# Patient Record
Sex: FEMALE | Race: WHITE | NOT HISPANIC OR LATINO | Employment: FULL TIME
[De-identification: names, ages, dates, MRNs, and addresses within clinical notes are randomized per-mention and may not be internally consistent; named-entity substitution may affect disease eponyms.]

---

## 2017-11-12 ENCOUNTER — HEALTH MAINTENANCE LETTER (OUTPATIENT)
Age: 32
End: 2017-11-12

## 2018-10-26 ENCOUNTER — APPOINTMENT (OUTPATIENT)
Dept: CT IMAGING | Facility: CLINIC | Age: 33
End: 2018-10-26
Attending: EMERGENCY MEDICINE
Payer: COMMERCIAL

## 2018-10-26 ENCOUNTER — HOSPITAL ENCOUNTER (EMERGENCY)
Facility: CLINIC | Age: 33
Discharge: HOME | End: 2018-10-26
Attending: EMERGENCY MEDICINE | Admitting: EMERGENCY MEDICINE
Payer: COMMERCIAL

## 2018-10-26 VITALS
DIASTOLIC BLOOD PRESSURE: 60 MMHG | RESPIRATION RATE: 20 BRPM | BODY MASS INDEX: 27.32 KG/M2 | HEIGHT: 66 IN | OXYGEN SATURATION: 97 % | TEMPERATURE: 99.5 F | WEIGHT: 170 LBS | SYSTOLIC BLOOD PRESSURE: 104 MMHG | HEART RATE: 122 BPM

## 2018-10-26 DIAGNOSIS — N10 ACUTE PYELONEPHRITIS: ICD-10-CM

## 2018-10-26 LAB
ALBUMIN SERPL-MCNC: 3.9 G/DL (ref 3.4–5)
ALBUMIN UR-MCNC: 100 MG/DL
ALP SERPL-CCNC: 46 U/L (ref 40–150)
ALT SERPL W P-5'-P-CCNC: 23 U/L (ref 0–50)
AMORPH CRY #/AREA URNS HPF: ABNORMAL /HPF
ANION GAP SERPL CALCULATED.3IONS-SCNC: 8 MMOL/L (ref 3–14)
APPEARANCE UR: ABNORMAL
AST SERPL W P-5'-P-CCNC: 13 U/L (ref 0–45)
B-HCG FREE SERPL-ACNC: <5 IU/L
BACTERIA #/AREA URNS HPF: ABNORMAL /HPF
BASOPHILS # BLD AUTO: 0 10E9/L (ref 0–0.2)
BASOPHILS NFR BLD AUTO: 0.2 %
BILIRUB SERPL-MCNC: 0.6 MG/DL (ref 0.2–1.3)
BILIRUB UR QL STRIP: NEGATIVE
BUN SERPL-MCNC: 9 MG/DL (ref 7–30)
CALCIUM SERPL-MCNC: 8.6 MG/DL (ref 8.5–10.1)
CHLORIDE SERPL-SCNC: 107 MMOL/L (ref 94–109)
CO2 SERPL-SCNC: 24 MMOL/L (ref 20–32)
COLOR UR AUTO: YELLOW
CREAT SERPL-MCNC: 0.77 MG/DL (ref 0.52–1.04)
DIFFERENTIAL METHOD BLD: ABNORMAL
EOSINOPHIL # BLD AUTO: 0.1 10E9/L (ref 0–0.7)
EOSINOPHIL NFR BLD AUTO: 0.5 %
ERYTHROCYTE [DISTWIDTH] IN BLOOD BY AUTOMATED COUNT: 12.9 % (ref 10–15)
GFR SERPL CREATININE-BSD FRML MDRD: 86 ML/MIN/1.7M2
GLUCOSE SERPL-MCNC: 142 MG/DL (ref 70–99)
GLUCOSE UR STRIP-MCNC: NEGATIVE MG/DL
HCT VFR BLD AUTO: 38.5 % (ref 35–47)
HGB BLD-MCNC: 12.9 G/DL (ref 11.7–15.7)
HGB UR QL STRIP: ABNORMAL
IMM GRANULOCYTES # BLD: 0 10E9/L (ref 0–0.4)
IMM GRANULOCYTES NFR BLD: 0.4 %
KETONES UR STRIP-MCNC: NEGATIVE MG/DL
LEUKOCYTE ESTERASE UR QL STRIP: ABNORMAL
LIPASE SERPL-CCNC: 112 U/L (ref 73–393)
LYMPHOCYTES # BLD AUTO: 0.5 10E9/L (ref 0.8–5.3)
LYMPHOCYTES NFR BLD AUTO: 4 %
MCH RBC QN AUTO: 31.4 PG (ref 26.5–33)
MCHC RBC AUTO-ENTMCNC: 33.5 G/DL (ref 31.5–36.5)
MCV RBC AUTO: 94 FL (ref 78–100)
MONOCYTES # BLD AUTO: 0.3 10E9/L (ref 0–1.3)
MONOCYTES NFR BLD AUTO: 2.8 %
MUCOUS THREADS #/AREA URNS LPF: PRESENT /LPF
NEUTROPHILS # BLD AUTO: 10.4 10E9/L (ref 1.6–8.3)
NEUTROPHILS NFR BLD AUTO: 92.1 %
NITRATE UR QL: NEGATIVE
NRBC # BLD AUTO: 0 10*3/UL
NRBC BLD AUTO-RTO: 0 /100
PH UR STRIP: 6 PH (ref 5–7)
PLATELET # BLD AUTO: 178 10E9/L (ref 150–450)
POTASSIUM SERPL-SCNC: 3.9 MMOL/L (ref 3.4–5.3)
PROT SERPL-MCNC: 7.1 G/DL (ref 6.8–8.8)
RBC # BLD AUTO: 4.11 10E12/L (ref 3.8–5.2)
RBC #/AREA URNS AUTO: 47 /HPF (ref 0–2)
SODIUM SERPL-SCNC: 139 MMOL/L (ref 133–144)
SOURCE: ABNORMAL
SP GR UR STRIP: 1.01 (ref 1–1.03)
SQUAMOUS #/AREA URNS AUTO: 5 /HPF (ref 0–1)
TRANS CELLS #/AREA URNS HPF: 42 /HPF (ref 0–1)
UROBILINOGEN UR STRIP-MCNC: 0 MG/DL (ref 0–2)
WBC # BLD AUTO: 11.2 10E9/L (ref 4–11)
WBC #/AREA URNS AUTO: >182 /HPF (ref 0–5)
WBC CLUMPS #/AREA URNS HPF: PRESENT /HPF

## 2018-10-26 PROCEDURE — 83690 ASSAY OF LIPASE: CPT | Performed by: EMERGENCY MEDICINE

## 2018-10-26 PROCEDURE — 25000128 H RX IP 250 OP 636: Performed by: EMERGENCY MEDICINE

## 2018-10-26 PROCEDURE — 80053 COMPREHEN METABOLIC PANEL: CPT | Performed by: EMERGENCY MEDICINE

## 2018-10-26 PROCEDURE — 74176 CT ABD & PELVIS W/O CONTRAST: CPT

## 2018-10-26 PROCEDURE — 81001 URINALYSIS AUTO W/SCOPE: CPT | Performed by: EMERGENCY MEDICINE

## 2018-10-26 PROCEDURE — 84702 CHORIONIC GONADOTROPIN TEST: CPT

## 2018-10-26 PROCEDURE — 99285 EMERGENCY DEPT VISIT HI MDM: CPT | Mod: 25

## 2018-10-26 PROCEDURE — 96365 THER/PROPH/DIAG IV INF INIT: CPT

## 2018-10-26 PROCEDURE — 96376 TX/PRO/DX INJ SAME DRUG ADON: CPT

## 2018-10-26 PROCEDURE — 85025 COMPLETE CBC W/AUTO DIFF WBC: CPT | Performed by: EMERGENCY MEDICINE

## 2018-10-26 PROCEDURE — 87086 URINE CULTURE/COLONY COUNT: CPT | Performed by: EMERGENCY MEDICINE

## 2018-10-26 PROCEDURE — 87088 URINE BACTERIA CULTURE: CPT | Performed by: EMERGENCY MEDICINE

## 2018-10-26 PROCEDURE — 96361 HYDRATE IV INFUSION ADD-ON: CPT

## 2018-10-26 PROCEDURE — 96375 TX/PRO/DX INJ NEW DRUG ADDON: CPT

## 2018-10-26 PROCEDURE — 87186 SC STD MICRODIL/AGAR DIL: CPT | Performed by: EMERGENCY MEDICINE

## 2018-10-26 RX ORDER — ONDANSETRON 2 MG/ML
4 INJECTION INTRAMUSCULAR; INTRAVENOUS
Status: COMPLETED | OUTPATIENT
Start: 2018-10-26 | End: 2018-10-26

## 2018-10-26 RX ORDER — MORPHINE SULFATE 4 MG/ML
4 INJECTION, SOLUTION INTRAMUSCULAR; INTRAVENOUS
Status: COMPLETED | OUTPATIENT
Start: 2018-10-26 | End: 2018-10-26

## 2018-10-26 RX ORDER — HYDROCODONE BITARTRATE AND ACETAMINOPHEN 5; 325 MG/1; MG/1
1 TABLET ORAL EVERY 4 HOURS PRN
Qty: 8 TABLET | Refills: 0 | Status: SHIPPED | OUTPATIENT
Start: 2018-10-26 | End: 2018-11-08

## 2018-10-26 RX ORDER — CEPHALEXIN 500 MG/1
500 CAPSULE ORAL 2 TIMES DAILY
Qty: 14 CAPSULE | Refills: 0 | Status: SHIPPED | OUTPATIENT
Start: 2018-10-26 | End: 2018-11-02

## 2018-10-26 RX ORDER — MORPHINE SULFATE 4 MG/ML
4 INJECTION, SOLUTION INTRAMUSCULAR; INTRAVENOUS
Status: DISCONTINUED | OUTPATIENT
Start: 2018-10-26 | End: 2018-10-26 | Stop reason: HOSPADM

## 2018-10-26 RX ORDER — ONDANSETRON 4 MG/1
4 TABLET, ORALLY DISINTEGRATING ORAL EVERY 6 HOURS PRN
Qty: 12 TABLET | Refills: 0 | Status: SHIPPED | OUTPATIENT
Start: 2018-10-26 | End: 2018-10-29

## 2018-10-26 RX ORDER — KETOROLAC TROMETHAMINE 15 MG/ML
15 INJECTION, SOLUTION INTRAMUSCULAR; INTRAVENOUS ONCE
Status: COMPLETED | OUTPATIENT
Start: 2018-10-26 | End: 2018-10-26

## 2018-10-26 RX ORDER — CEFTRIAXONE 1 G/1
1 INJECTION, POWDER, FOR SOLUTION INTRAMUSCULAR; INTRAVENOUS ONCE
Status: COMPLETED | OUTPATIENT
Start: 2018-10-26 | End: 2018-10-26

## 2018-10-26 RX ADMIN — MORPHINE SULFATE 4 MG: 4 INJECTION INTRAVENOUS at 04:48

## 2018-10-26 RX ADMIN — KETOROLAC TROMETHAMINE 15 MG: 15 INJECTION, SOLUTION INTRAMUSCULAR; INTRAVENOUS at 04:48

## 2018-10-26 RX ADMIN — MORPHINE SULFATE 4 MG: 4 INJECTION INTRAVENOUS at 06:30

## 2018-10-26 RX ADMIN — ONDANSETRON 4 MG: 2 INJECTION INTRAMUSCULAR; INTRAVENOUS at 04:48

## 2018-10-26 RX ADMIN — SODIUM CHLORIDE 1000 ML: 9 INJECTION, SOLUTION INTRAVENOUS at 04:43

## 2018-10-26 RX ADMIN — CEFTRIAXONE SODIUM 1 G: 1 INJECTION, POWDER, FOR SOLUTION INTRAMUSCULAR; INTRAVENOUS at 06:27

## 2018-10-26 RX ADMIN — MORPHINE SULFATE 4 MG: 4 INJECTION INTRAVENOUS at 05:12

## 2018-10-26 ASSESSMENT — ENCOUNTER SYMPTOMS
FLANK PAIN: 1
NAUSEA: 1

## 2018-10-26 NOTE — ED AVS SNAPSHOT
[facility] Emergency Department    [address]    [facility] [address]    Phone:  [phone]    Fax:  [phone]                                       Minerva Cardona   MRN: [id number]    Department:  [facility] Emergency Department   Date of Visit:  10/26/2018           After Visit Summary Signature Page     I have received my discharge instructions, and my questions have been answered. I have discussed any challenges I see with this plan with the nurse or doctor.    ..........................................................................................................................................  Patient/Patient Representative Signature      ..........................................................................................................................................  Patient Representative Print Name and Relationship to Patient    ..................................................               ................................................  Date                                   Time    ..........................................................................................................................................  Reviewed by Signature/Title    ...................................................              ..............................................  Date                                               Time          22EPIC Rev 08/18

## 2018-10-26 NOTE — LETTER
October 26, 2018      To Whom It May Concern:      Minerva Cardona was seen in our Emergency Department today, 10/26/18.  I expect her condition to improve over the next 2-3 days.  She may return to work when improved.    Sincerely,        Ramiro Vo RN

## 2018-10-26 NOTE — ED PROVIDER NOTES
"  History     Chief Complaint:  Flank Pain    HPI   Minerva Cardona is a 33 year old female who presents with right sided flank pain for a day. No recent abdominal surgery. She reports that she has had this for a day. She reports that the pain started in her mid abdomen but has radiated to her back. She denies any left sided pain. Tylenol at 2100. No chance of pregnancy per patient. No fever or diarrhea.  Positive nausea.       Allergies:  No Known Allergies     Medications:    Albuterol   IUD    Past Medical History:    History reviewed. No pertinent past medical history.    Past Surgical History:    History reviewed. No pertinent surgical history.    Family History:    Cancer    Social History:  The patient  reports that she has never smoked. She has never used smokeless tobacco. She reports that she drinks alcohol. She reports that she does not use illicit drugs.   Marital Status:       Review of Systems   Gastrointestinal: Positive for nausea.   Genitourinary: Positive for flank pain.   All other systems reviewed and are negative.    Physical Exam     Patient Vitals for the past 24 hrs:   BP Temp Temp src Pulse Resp SpO2 Height Weight   10/26/18 0428 120/74 99.5  F (37.5  C) Temporal 122 20 99 % 1.676 m (5' 6\") 77.1 kg (170 lb)         Physical Exam  Nursing note and vitals reviewed.  Constitutional: Cooperative. Uncomfortable appearing.   HENT:   Mouth/Throat: Mucous membranes are normal.   Cardiovascular: Tachycardic rate, regular rhythm and normal heart sounds.  No murmur.  Pulmonary/Chest: Effort normal and breath sounds normal. No respiratory distress. No wheezes. No rales.   Abdominal: Soft. Normal appearance and bowel sounds are normal. No distension. There is mild RUQ and Right CVA tenderness. There is no rigidity and no guarding.   Neurological: Alert. Oriented x4  Skin: Skin is warm and dry. No rash noted.   Psychiatric: Normal mood and affect.     Emergency Department Course   Imaging:  CT " Abdomen Pelvis without Contrast (stone protocol)   Preliminary Result   IMPRESSION: No urinary stone or hydronephrosis. No acute abnormality.        I communicated the results of the imaging studies with the patient who expressed understanding of these findings.      Laboratory:  UA: LE Large, WBC >182/hpf, RBC 47/hpf, WBC clumps present, Bacteria moderate    Urine culture: pending    ISTAT HCG <5.0    CBC: WBC 11.2, Absolute Neutrophil 10.4, Absolute Lymphocytes 0.5, otherwise within normal limits   CMP: Glucose 142, otherwise within normal limits   Lipase 112    Interventions:  0443: NS 1L IV Bolus   0448: Morphine 4mg IV  0448: Toradol 10mg IV  0448: Zofran 4mg IV  0512: Morphine 4mg    0615: Rocephin 1g IV    Emergency Department Course:  Past medical records, nursing notes, and vitals reviewed.  I performed an exam of the patient and obtained history, as documented above.       IV inserted and blood drawn for the above work up to be conducted.     The patient was sent for imaging studies while in the emergency department, findings above.       Impression & Plan    Medical Decision Makin yo female who presents with right flank pain.  CT negative for ureteral stone, bowel obstruction/perforation, appendicitis or other pathology.  UA c/w infection.  Clinical picture c/w uncomplicated pyelonephritis.  No signs of sepsis or severe sepsis.  IV dose of antibiotics administered and culture sent.  Plan for discharge home with pain/nausea medication is addition to antibiotics.  Follow up instructions and return precautions discussed.        Diagnosis:    ICD-10-CM    1. Acute pyelonephritis N10        Disposition:  discharged to home    Discharge Medications:  New Prescriptions    CEPHALEXIN (KEFLEX) 500 MG CAPSULE    Take 1 capsule (500 mg) by mouth 2 times daily for 7 days    HYDROCODONE-ACETAMINOPHEN (NORCO) 5-325 MG PER TABLET    Take 1 tablet by mouth every 4 hours as needed for pain    ONDANSETRON (ZOFRAN  ODT) 4 MG ODT TAB    Take 1 tablet (4 mg) by mouth every 6 hours as needed for nausea     IMitzi am serving as a scribe at 4:34 AM on 10/26/2018 to document services personally performed by Bar Irwin MD based on my observations and the provider's statements to me.    [facility] EMERGENCY DEPARTMENT       Bar Irwin MD  10/26/18 0621

## 2018-10-26 NOTE — ED AVS SNAPSHOT
[facility] Emergency Department    [address] [place] [address]    Phone:  [phone]    Fax:  [phone]                                       Minerva Cardona   MRN: [id number]    Department:  [facility] Emergency Department   Date of Visit:  10/26/2018           Patient Information     Date Of Birth          1985        Your diagnoses for this visit were:     Acute pyelonephritis        You were seen by Bar Irwin MD.      Follow-up Information     Follow up with Dixie Gilman PA-C.    Specialty:  Physician Assistant    Why:  As needed    Contact information:    [address] [place] [address]  [phone]        Discharge References/Attachments     PYELONEPHRITIS, FEMALE (ADULT) (ENGLISH)      24 Hour Appointment Hotline       To make an appointment at any [facility], call [phone] ([phone]). If you don't have a family doctor or clinic, we will help you find one. [facility] are conveniently located to serve the needs of you and your family.             Review of your medicines      START taking        Dose / Directions Last dose taken    cephALEXin 500 MG capsule   Commonly known as:  KEFLEX   Dose:  500 mg   Quantity:  14 capsule        Take 1 capsule (500 mg) by mouth 2 times daily for 7 days   Refills:  0        HYDROcodone-acetaminophen 5-325 MG per tablet   Commonly known as:  NORCO   Dose:  1 tablet   Quantity:  8 tablet        Take 1 tablet by mouth every 4 hours as needed for pain   Refills:  0        ondansetron 4 MG ODT tab   Commonly known as:  ZOFRAN ODT   Dose:  4 mg   Quantity:  12 tablet        Take 1 tablet (4 mg) by mouth every 6 hours as needed for nausea   Refills:  0          Our records show that you are taking the medicines listed below. If these are incorrect, please call your family doctor or clinic.        Dose / Directions Last dose taken    albuterol 108 (90 Base) MCG/ACT inhaler    Commonly known as:  PROAIR HFA/PROVENTIL HFA/VENTOLIN HFA   Dose:  2 puff   Quantity:  1 Inhaler        Inhale 2 puffs into the lungs every 4 hours as needed for shortness of breath / dyspnea (or can try before a run)   Refills:  1        paragard intrauterine copper   Dose:  1 each        1 each by Intrauterine route once.   Refills:  0                Information about OPIOIDS     PRESCRIPTION OPIOIDS: WHAT YOU NEED TO KNOW   We gave you an opioid (narcotic) pain medicine. It is important to manage your pain, but opioids are not always the best choice. You should first try all the other options your care team gave you. Take this medicine for as short a time (and as few doses) as possible.    Some activities can increase your pain, such as bandage changes or therapy sessions. It may help to take your pain medicine 30 to 60 minutes before these activities. Reduce your stress by getting enough sleep, working on hobbies you enjoy and practicing relaxation or meditation. Talk to your care team about ways to manage your pain beyond prescription opioids.    These medicines have risks:    DO NOT drive when on new or higher doses of pain medicine. These medicines can affect your alertness and reaction times, and you could be arrested for driving under the influence (DUI). If you need to use opioids long-term, talk to your care team about driving.    DO NOT operate heavy machinery    DO NOT do any other dangerous activities while taking these medicines.    DO NOT drink any alcohol while taking these medicines.     If the opioid prescribed includes acetaminophen, DO NOT take with any other medicines that contain acetaminophen. Read all labels carefully. Look for the word  acetaminophen  or  Tylenol.  Ask your pharmacist if you have questions or are unsure.    You can get addicted to pain medicines, especially if you have a history of addiction (chemical, alcohol or substance dependence). Talk to your care team about ways to  reduce this risk.    All opioids tend to cause constipation. Drink plenty of water and eat foods that have a lot of fiber, such as fruits, vegetables, prune juice, apple juice and high-fiber cereal. Take a laxative (Miralax, milk of magnesia, Colace, Senna) if you don t move your bowels at least every other day. Other side effects include upset stomach, sleepiness, dizziness, throwing up, tolerance (needing more of the medicine to have the same effect), physical dependence and slowed breathing.    Store your pills in a secure place, locked if possible. We will not replace any lost or stolen medicine. If you don t finish your medicine, please throw away (dispose) as directed by your pharmacist. The [place] [employer]ution [employer] has more information about safe disposal: [email or web address]://[email or web address].[email or web address]/living-green/[email or web address]-unwanted-medications        Prescriptions were sent or printed at these locations (3 Prescriptions)                   Other Prescriptions                Printed at Department/Unit printer (3 of 3)         cephALEXin (KEFLEX) 500 MG capsule               HYDROcodone-acetaminophen (NORCO) 5-325 MG per tablet               ondansetron (ZOFRAN ODT) 4 MG ODT tab                Procedures and tests performed during your visit     CBC with platelets differential    CT Abdomen Pelvis without Contrast (stone protocol)    Comprehensive metabolic panel    ISTAT HCG Quantitative Pregnancy Nursing POCT    ISTAT HCG Quantitative Pregnancy POCT    Lipase    Peripheral IV: Standard    Pulse oximetry nursing    UA with Microscopic    Urine Culture Aerobic Bacterial      Orders Needing Specimen Collection     None      Pending Results     Date and Time Order Name Status Description    10/26/2018 0613 Urine Culture Aerobic Bacterial In process     10/26/2018 0441 CT Abdomen Pelvis without Contrast (stone protocol) Preliminary             Pending Culture Results     Date and Time Order Name Status Description     10/26/2018 0613 Urine Culture Aerobic Bacterial In process             Pending Results Instructions     If you had any lab results that were not finalized at the time of your Discharge, you can call the ED Lab Result RN at [phone]. You will be contacted by this team for any positive Lab results or changes in treatment. The nurses are available 7 days a week from 10A to 6:30P.  You can leave a message 24 hours per day and they will return your call.        Test Results From Your Hospital Stay        10/26/2018  4:52 AM      Component Results     Component Value Ref Range & Units Status    WBC 11.2 (H) 4.0 - 11.0 10e9/L Final    RBC Count 4.11 3.8 - 5.2 10e12/L Final    Hemoglobin 12.9 11.7 - 15.7 g/dL Final    Hematocrit 38.5 35.0 - 47.0 % Final    MCV 94 78 - 100 fl Final    MCH 31.4 26.5 - 33.0 pg Final    MCHC 33.5 31.5 - 36.5 g/dL Final    RDW 12.9 10.0 - 15.0 % Final    Platelet Count 178 150 - 450 10e9/L Final    Diff Method Automated Method  Final    % Neutrophils 92.1 % Final    % Lymphocytes 4.0 % Final    % Monocytes 2.8 % Final    % Eosinophils 0.5 % Final    % Basophils 0.2 % Final    % Immature Granulocytes 0.4 % Final    Nucleated RBCs 0 0 /100 Final    Absolute Neutrophil 10.4 (H) 1.6 - 8.3 10e9/L Final    Absolute Lymphocytes 0.5 (L) 0.8 - 5.3 10e9/L Final    Absolute Monocytes 0.3 0.0 - 1.3 10e9/L Final    Absolute Eosinophils 0.1 0.0 - 0.7 10e9/L Final    Absolute Basophils 0.0 0.0 - 0.2 10e9/L Final    Abs Immature Granulocytes 0.0 0 - 0.4 10e9/L Final    Absolute Nucleated RBC 0.0  Final         10/26/2018  5:11 AM      Component Results     Component Value Ref Range & Units Status    Sodium 139 133 - 144 mmol/L Final    Potassium 3.9 3.4 - 5.3 mmol/L Final    Chloride 107 94 - 109 mmol/L Final    Carbon Dioxide 24 20 - 32 mmol/L Final    Anion Gap 8 3 - 14 mmol/L Final    Glucose 142 (H) 70 - 99 mg/dL Final    Urea Nitrogen 9 7 - 30 mg/dL Final    Creatinine 0.77 0.52 - 1.04 mg/dL Final     GFR Estimate 86 >60 mL/min/1.7m2 Final    Non  GFR Calc    GFR Estimate If Black >90 >60 mL/min/1.7m2 Final    African American GFR Calc    Calcium 8.6 8.5 - 10.1 mg/dL Final    Bilirubin Total 0.6 0.2 - 1.3 mg/dL Final    Albumin 3.9 3.4 - 5.0 g/dL Final    Protein Total 7.1 6.8 - 8.8 g/dL Final    Alkaline Phosphatase 46 40 - 150 U/L Final    ALT 23 0 - 50 U/L Final    AST 13 0 - 45 U/L Final         10/26/2018  5:11 AM      Component Results     Component Value Ref Range & Units Status    Lipase 112 73 - 393 U/L Final         10/26/2018  6:08 AM      Component Results     Component Value Ref Range & Units Status    Color Urine Yellow  Final    Appearance Urine Cloudy  Final    Glucose Urine Negative NEG^Negative mg/dL Final    Bilirubin Urine Negative NEG^Negative Final    Ketones Urine Negative NEG^Negative mg/dL Final    Specific Gravity Urine 1.013 1.003 - 1.035 Final    Blood Urine Moderate (A) NEG^Negative Final    pH Urine 6.0 5.0 - 7.0 pH Final    Protein Albumin Urine 100 (A) NEG^Negative mg/dL Final    Urobilinogen mg/dL 0.0 0.0 - 2.0 mg/dL Final    Nitrite Urine Negative NEG^Negative Final    Leukocyte Esterase Urine Large (A) NEG^Negative Final    Source Midstream Urine  Final    WBC Urine >182 (H) 0 - 5 /HPF Final    RBC Urine 47 (H) 0 - 2 /HPF Final    WBC Clumps Present (A) NEG^Negative /HPF Final    Bacteria Urine Moderate (A) NEG^Negative /HPF Final    Squamous Epithelial /HPF Urine 5 (H) 0 - 1 /HPF Final    Transitional Epi 42 (H) 0 - 1 /HPF Final    Mucous Urine Present (A) NEG^Negative /LPF Final    Amorphous Crystals Few (A) NEG^Negative /HPF Final         10/26/2018  5:20 AM      Narrative     CT ABDOMEN PELVIS W/O CONTRAST  10/26/2018 5:05 AM     HISTORY: Abdominal pain - right flank.    TECHNIQUE: Noncontrast CT abdomen and pelvis was performed. Radiation  dose for this scan was reduced using automated exposure control,  adjustment of the mA and/or kV according to patient  size, or iterative  reconstruction technique.    COMPARISON: None.    FINDINGS:  Abdomen: There is no renal or ureteral stone on either side. No  hydronephrosis. The kidneys appear normal on this noncontrast exam.    The lung bases are unremarkable. Evaluation of the solid abdominal  organs is limited by the lack of intravenous contrast. The spleen is  at the upper limits of normal in size. The liver, gallbladder,  pancreas and adrenal glands are normal in appearance. There is no  abdominal or pelvic lymph node enlargement.    Pelvis: There is an IUD in place. No adnexal mass. No bowel  obstruction or inflammation. The appendix is normal. No free  intraperitoneal gas or fluid.        Impression     IMPRESSION: No urinary stone or hydronephrosis. No acute abnormality.         10/26/2018  4:57 AM      Component Results     Component Value Ref Range & Units Status    HCG Quantitative Serum <5.0 <5.0 IU/L Final         10/26/2018  6:22 AM                Clinical Quality Measure: Blood Pressure Screening     Your blood pressure was checked while you were in the emergency department today. The last reading we obtained was  BP: 104/60 . Please read the guidelines below about what these numbers mean and what you should do about them.  If your systolic blood pressure (the top number) is less than 120 and your diastolic blood pressure (the bottom number) is less than 80, then your blood pressure is normal. There is nothing more that you need to do about it.  If your systolic blood pressure (the top number) is 120-139 or your diastolic blood pressure (the bottom number) is 80-89, your blood pressure may be higher than it should be. You should have your blood pressure rechecked within a year by a primary care provider.  If your systolic blood pressure (the top number) is 140 or greater or your diastolic blood pressure (the bottom number) is 90 or greater, you may have high blood pressure. High blood pressure is treatable, but  if left untreated over time it can put you at risk for heart attack, stroke, or kidney failure. You should have your blood pressure rechecked by a primary care provider within the next 4 weeks.  If your provider in the emergency department today gave you specific instructions to follow-up with your doctor or provider even sooner than that, you should follow that instruction and not wait for up to 4 weeks for your follow-up visit.        Thank you for choosing [facility]       Thank you for choosing [facility] for your care. Our goal is always to provide you with excellent care. Hearing back from our patients is one way we can continue to improve our services. Please take a few minutes to complete the written survey that you may receive in the mail after you visit with us. Thank you!        [employer] Information     [employer] gives you secure access to your electronic health record. If you see a primary care provider, you can also send messages to your care team and make appointments. If you have questions, please call your primary care clinic.  If you do not have a primary care provider, please call [phone] and they will assist you.        Care EveryWhere ID     This is your Care EveryWhere ID. This could be used by other organizations to access your [facility] medical records  [id number]        Equal Access to Services     HAYLEE ARGUETA : Hadii noemy Corea, akosua mars, laquita estrada, karine campoverde. So [facility] [phone].    ATENCIÓN: Si habla español, tiene a ortiz disposición servicios gratuitos de asistencia lingüística. Joshua al [phone].    We comply with applicable federal civil rights laws and [place] laws. We do not discriminate on the basis of race, color, national origin, age, disability, sex, sexual orientation, or gender identity.            After Visit Summary       This is your record. Keep this with you and show to your community pharmacist(s) and  doctor(s) at your next visit.

## 2018-10-27 LAB
BACTERIA SPEC CULT: ABNORMAL
Lab: ABNORMAL
SPECIMEN SOURCE: ABNORMAL

## 2018-11-08 ENCOUNTER — OFFICE VISIT (OUTPATIENT)
Dept: FAMILY MEDICINE | Facility: CLINIC | Age: 33
End: 2018-11-08
Payer: COMMERCIAL

## 2018-11-08 VITALS
BODY MASS INDEX: 28.25 KG/M2 | SYSTOLIC BLOOD PRESSURE: 110 MMHG | HEART RATE: 60 BPM | DIASTOLIC BLOOD PRESSURE: 62 MMHG | TEMPERATURE: 97.8 F | WEIGHT: 175 LBS | RESPIRATION RATE: 20 BRPM

## 2018-11-08 DIAGNOSIS — R30.0 DYSURIA: Primary | ICD-10-CM

## 2018-11-08 LAB
ALBUMIN UR-MCNC: NEGATIVE MG/DL
APPEARANCE UR: CLEAR
BACTERIA #/AREA URNS HPF: ABNORMAL /HPF
BILIRUB UR QL STRIP: NEGATIVE
COLOR UR AUTO: YELLOW
GLUCOSE UR STRIP-MCNC: NEGATIVE MG/DL
HGB UR QL STRIP: ABNORMAL
KETONES UR STRIP-MCNC: NEGATIVE MG/DL
LEUKOCYTE ESTERASE UR QL STRIP: ABNORMAL
NITRATE UR QL: NEGATIVE
PH UR STRIP: 7 PH (ref 5–7)
RBC #/AREA URNS AUTO: ABNORMAL /HPF
SOURCE: ABNORMAL
SP GR UR STRIP: <=1.005 (ref 1–1.03)
UROBILINOGEN UR STRIP-ACNC: 0.2 EU/DL (ref 0.2–1)
WBC #/AREA URNS AUTO: ABNORMAL /HPF

## 2018-11-08 PROCEDURE — 99213 OFFICE O/P EST LOW 20 MIN: CPT | Performed by: FAMILY MEDICINE

## 2018-11-08 PROCEDURE — 81001 URINALYSIS AUTO W/SCOPE: CPT | Performed by: FAMILY MEDICINE

## 2018-11-08 RX ORDER — SULFAMETHOXAZOLE/TRIMETHOPRIM 800-160 MG
1 TABLET ORAL 2 TIMES DAILY
Qty: 6 TABLET | Refills: 0 | Status: SHIPPED | OUTPATIENT
Start: 2018-11-08 | End: 2018-11-11

## 2018-11-08 NOTE — PATIENT INSTRUCTIONS

## 2018-11-08 NOTE — PROGRESS NOTES
SUBJECTIVE:   Minerva Cardona is a 33 year old female who presents to clinic today for the following health issues:      URINARY TRACT SYMPTOMS  Onset: x3-4 days    Description:   Painful urination (Dysuria): YES  Blood in urine (Hematuria): no   Delay in urine (Hesitency): YES  Urinary frequency: some     Intensity: moderate    Progression of Symptoms:  same    Accompanying Signs & Symptoms:  Fever/chills: no   Flank pain YES  Nausea and vomiting: no   Any vaginal symptoms: vaginal discharge- slight no odor   Abdominal/Pelvic Pain: no     History:   History of frequent UTI's: YES  History of kidney stones: no   Sexually Active: YES  Possibility of pregnancy: No    Precipitating factors:       Therapies Tried and outcome: keflex for recent pyelonephritis           Problem list and histories reviewed & adjusted, as indicated.  Additional history: as documented    Patient Active Problem List   Diagnosis     Anemia     CARDIOVASCULAR SCREENING; LDL GOAL LESS THAN 160     IUD contraception     No past surgical history on file.    Social History   Substance Use Topics     Smoking status: Never Smoker     Smokeless tobacco: Never Used     Alcohol use Yes      Comment: on occasion     Family History   Problem Relation Age of Onset     Cancer Maternal Grandmother      lymphoma     Cancer - colorectal Maternal Grandfather      Alcohol/Drug Paternal Grandfather      alcoholic           Reviewed and updated as needed this visit by clinical staff  Tobacco       Reviewed and updated as needed this visit by Provider         ROS:  Constitutional, gi and gu systems are negative, except as otherwise noted.    OBJECTIVE:     /62 (BP Location: Right arm, Patient Position: Chair, Cuff Size: Adult Large)  Pulse 60  Temp 97.8  F (36.6  C) (Oral)  Resp 20  Wt 175 lb (79.4 kg)  LMP 10/25/2018  Breastfeeding? No  BMI 28.25 kg/m2  Body mass index is 28.25 kg/(m^2).  GENERAL: healthy, alert and no distress  RESP: lungs  clear to auscultation - no rales, rhonchi or wheezes  CV: regular rate and rhythm, normal S1 S2  ABDOMEN: soft, nontender,  and bowel sounds normal    Diagnostic Test Results:  Results for orders placed or performed in visit on 11/08/18 (from the past 24 hour(s))   UA reflex to Microscopic and Culture   Result Value Ref Range    Color Urine Yellow     Appearance Urine Clear     Glucose Urine Negative NEG^Negative mg/dL    Bilirubin Urine Negative NEG^Negative    Ketones Urine Negative NEG^Negative mg/dL    Specific Gravity Urine <=1.005 1.003 - 1.035    Blood Urine Small (A) NEG^Negative    pH Urine 7.0 5.0 - 7.0 pH    Protein Albumin Urine Negative NEG^Negative mg/dL    Urobilinogen Urine 0.2 0.2 - 1.0 EU/dL    Nitrite Urine Negative NEG^Negative    Leukocyte Esterase Urine Small (A) NEG^Negative    Source Midstream Urine    Urine Microscopic   Result Value Ref Range    WBC Urine 0 - 5 OTO5^0 - 5 /HPF    RBC Urine O - 2 OTO2^O - 2 /HPF    Bacteria Urine Few (A) NEG^Negative /HPF       ASSESSMENT/PLAN:         1. Dysuria  - will treat base on symptoms. Patient to RTC if symptoms persist or worsen   - UA reflex to Microscopic and Culture  - Urine Microscopic  - sulfamethoxazole-trimethoprim (BACTRIM DS/SEPTRA DS) 800-160 MG per tablet; Take 1 tablet by mouth 2 times daily for 3 days  Dispense: 6 tablet; Refill: 0    See Patient Instructions    Dariela Lawson MD  [facility]

## 2018-11-08 NOTE — MR AVS SNAPSHOT
"              After Visit Summary   11/8/2018    Minerva Cardona    MRN: [id number]           Patient Information     Date Of Birth          1985        Visit Information        Provider Department      11/8/2018 11:40 AM Dariela Lawson MD [facility]        Today's Diagnoses     Dysuria    -  1      Care Instructions      Dysuria     Painful urination (dysuria) is often caused by a problem in the urinary tract.   Dysuria is pain felt during urination. It is often described as a burning. Learn more about this problem and how it can be treated.  What causes dysuria?  Possible causes include:    Infection with a bacteria or virus such as a urinary tract infection (UTI or a sexually transmitted infection (STI)    Sensitivity or allergy to chemicals such as those found in lotions and other products    Prostate or bladder problems    Radiation therapy to the pelvic area  How is dysuria diagnosed?  Your healthcare provider will examine you. He or she will ask about your symptoms and health. After talking with you and doing a physical exam, your healthcare provider may know what is causing your dysuria. He or she will usually request  a sample of your urine. Tests of your urine, or a \"urinalysis,\" are done. A urinalysis may include:    Looking at the urine sample (visual exam)    Checking for substances (chemical exam)    Looking at a small amount under a microscope (microscopic exam)  Some parts of the urinalysis may be done in the provider's office and some in a lab. And, the urine sample may be checked for bacteria and yeast (urine culture). Your healthcare provider will tell you more about these tests if they are needed.  How is dysuria treated?  Treatment depends on the cause. If you have a bacterial infection, you may need antibiotics. You may be given medicines to make it easier for you to urinate and help relieve pain. Your healthcare provider can tell you more about your " treatment options. Untreated, symptoms may get worse.  When to call your healthcare provider  Call the healthcare provider right away if you have any of the following:    Fever of 100.4 F (38 C) or higher     No improvement after three days of treatment    Trouble urinating because of pain    New or increased discharge from the vagina or penis    Rash or joint pain    Increased back or abdominal pain    Enlarged painful lymph nodes (lumps) in the groin   Date Last Reviewed: 1/1/2017 2000-2018 [employer]. [address] [place] [address]. All rights reserved. This information is not intended as a substitute for professional medical care. Always follow your healthcare professional's instructions.                Follow-ups after your visit        Follow-up notes from your care team     Return in about 2 weeks (around 11/22/2018).      Who to contact     If you have questions or need follow up information about today's clinic visit or your schedule please contact [facility] directly at [phone].  Normal or non-critical lab and imaging results will be communicated to you by Zankhart, letter or phone within 4 business days after the clinic has received the results. If you do not hear from us within 7 days, please contact the clinic through [employer] or phone. If you have a critical or abnormal lab result, we will notify you by phone as soon as possible.  Submit refill requests through [employer] or call your pharmacy and they will forward the refill request to us. Please allow 3 business days for your refill to be completed.          Additional Information About Your Visit        Zankhart Information     [employer] gives you secure access to your electronic health record. If you see a primary care provider, you can also send messages to your care team and make appointments. If you have questions, please call your primary care clinic.  If you do not have a primary care provider,  please call [phone] and they will assist you.        Care EveryWhere ID     This is your Care EveryWhere ID. This could be used by other organizations to access your [facility] medical records  [id number]        Your Vitals Were     Pulse Temperature Respirations Last Period Breastfeeding? BMI (Body Mass Index)    60 97.8  F (36.6  C) (Oral) 20 10/25/2018 No 28.25 kg/m2       Blood Pressure from Last 3 Encounters:   11/08/18 110/62   10/26/18 104/60   11/13/14 123/74    Weight from Last 3 Encounters:   11/08/18 175 lb (79.4 kg)   10/26/18 170 lb (77.1 kg)   11/13/14 153 lb (69.4 kg)              We Performed the Following     UA reflex to Microscopic and Culture     Urine Microscopic          Today's Medication Changes          These changes are accurate as of 11/8/18 12:10 PM.  If you have any questions, ask your nurse or doctor.               Start taking these medicines.        Dose/Directions    sulfamethoxazole-trimethoprim 800-160 MG per tablet   Commonly known as:  BACTRIM DS/SEPTRA DS   Used for:  Dysuria   Started by:  Dariela Lawson MD        Dose:  1 tablet   Take 1 tablet by mouth 2 times daily for 3 days   Quantity:  6 tablet   Refills:  0         Stop taking these medicines if you haven't already. Please contact your care team if you have questions.     albuterol 108 (90 Base) MCG/ACT inhaler   Commonly known as:  PROAIR HFA/PROVENTIL HFA/VENTOLIN HFA   Stopped by:  Dariela Lawson MD           HYDROcodone-acetaminophen 5-325 MG per tablet   Commonly known as:  NORCO   Stopped by:  Dariela Lawson MD                Where to get your medicines      These medications were sent to [facility] [address] [address] [address]     Phone:  [phone]     sulfamethoxazole-trimethoprim 800-160 MG per tablet                Primary Care Provider Fax #    Physician No Ref-Primary [phone]       No  address on file        Equal Access to Services     [facility] ELLIE : Hadii aad rayo melanie Corea, washane liannebren, laquita nayelyjonaheva leonjanet adrien mendozamarco amadeleine campoverde. So [facility] [phone].    ATENCIÓN: Si habla español, tiene a ortiz disposición servicios gratuitos de asistencia lingüística. Llame al [phone].    We comply with applicable federal civil rights laws and [place] laws. We do not discriminate on the basis of race, color, national origin, age, disability, sex, sexual orientation, or gender identity.            Thank you!     Thank you for choosing [facility]  for your care. Our goal is always to provide you with excellent care. Hearing back from our patients is one way we can continue to improve our services. Please take a few minutes to complete the written survey that you may receive in the mail after your visit with us. Thank you!             Your Updated Medication List - Protect others around you: Learn how to safely use, store and throw away your medicines at www.disposemymeds.org.          This list is accurate as of 11/8/18 12:10 PM.  Always use your most recent med list.                   Brand Name Dispense Instructions for use Diagnosis    paragard intrauterine copper      1 each by Intrauterine route once.        sulfamethoxazole-trimethoprim 800-160 MG per tablet    BACTRIM DS/SEPTRA DS    6 tablet    Take 1 tablet by mouth 2 times daily for 3 days    Dysuria

## 2019-02-14 ENCOUNTER — TELEPHONE (OUTPATIENT)
Dept: FAMILY MEDICINE | Facility: CLINIC | Age: 34
End: 2019-02-14

## 2019-02-14 NOTE — TELEPHONE ENCOUNTER
Panel Management Review      Patient has the following on her problem list: None      Composite cancer screening  Chart review shows that this patient is due/due soon for the following Pap Smear  Summary:    Patient is due/failing the following:   PAP    Action needed:   Patient needs office visit for PE/PAP.    Type of outreach:    panel pool    Questions for provider review:    None                                                                                                                                    Kasey Perez/YENNI  Ava---[facility]       Chart routed to Care Team .

## 2019-02-14 NOTE — LETTER
[facility]  [address] [place] [address]  [phone]  March 15, 2019    Minerva Cardona  [address]  [place] [address]    Dear Minerva,    I care about your health and have reviewed your health plan. I have reviewed your medical conditions, medication list, and lab results and am making recommendations based on this review, to better manage your health.    You are in particular need of attention regarding:  -Cervical Cancer Screening  -Wellness (Physical) Visit     I am recommending that you:  {recommendations:-schedule a PAP SMEAR EXAM which is due.  Please disregard this reminder if you have had this exam elsewhere within the last year.  It would be helpful for us to have a copy of your recent pap smear report in our file so that we can best coordinate your care.    Here is a list of Health Maintenance topics that are due now or due soon:  Health Maintenance Due   Topic Date Due     HIV SCREEN (SYSTEM ASSIGNED)  07/19/2003     DTAP/TDAP/TD IMMUNIZATION (3 - Td) 09/03/2013     PREVENTIVE CARE VISIT  12/20/2014     PAP SCREENING Q3 YR (SYSTEM ASSIGNED)  12/20/2016     INFLUENZA VACCINE (1) 09/01/2018       Please call us at [phone] (or use [employer]) to address the above recommendations.     Thank you for trusting [facility] and we appreciate the opportunity to serve you.  We look forward to supporting your healthcare needs in the future.    Healthy Regards,    Dariela Lawson MD

## 2019-04-10 ENCOUNTER — OFFICE VISIT (OUTPATIENT)
Dept: URGENT CARE | Facility: URGENT CARE | Age: 34
End: 2019-04-10
Payer: COMMERCIAL

## 2019-04-10 ENCOUNTER — ANCILLARY PROCEDURE (OUTPATIENT)
Dept: GENERAL RADIOLOGY | Facility: CLINIC | Age: 34
End: 2019-04-10
Attending: FAMILY MEDICINE
Payer: COMMERCIAL

## 2019-04-10 VITALS
HEART RATE: 102 BPM | SYSTOLIC BLOOD PRESSURE: 110 MMHG | DIASTOLIC BLOOD PRESSURE: 80 MMHG | OXYGEN SATURATION: 99 % | TEMPERATURE: 97.4 F

## 2019-04-10 DIAGNOSIS — R10.30 LOWER ABDOMINAL PAIN: Primary | ICD-10-CM

## 2019-04-10 DIAGNOSIS — K59.00 CONSTIPATION, UNSPECIFIED CONSTIPATION TYPE: ICD-10-CM

## 2019-04-10 LAB
ALBUMIN UR-MCNC: ABNORMAL MG/DL
APPEARANCE UR: CLEAR
BASOPHILS # BLD AUTO: 0 10E9/L (ref 0–0.2)
BASOPHILS NFR BLD AUTO: 0.1 %
BILIRUB UR QL STRIP: ABNORMAL
COLOR UR AUTO: YELLOW
DIFFERENTIAL METHOD BLD: ABNORMAL
EOSINOPHIL # BLD AUTO: 0.2 10E9/L (ref 0–0.7)
EOSINOPHIL NFR BLD AUTO: 1.9 %
GLUCOSE UR STRIP-MCNC: NEGATIVE MG/DL
HGB UR QL STRIP: NEGATIVE
KETONES UR STRIP-MCNC: 40 MG/DL
LEUKOCYTE ESTERASE UR QL STRIP: NEGATIVE
LYMPHOCYTES # BLD AUTO: 0.8 10E9/L (ref 0.8–5.3)
LYMPHOCYTES NFR BLD AUTO: 7.2 %
MONOCYTES # BLD AUTO: 0.9 10E9/L (ref 0–1.3)
MONOCYTES NFR BLD AUTO: 8.8 %
NEUTROPHILS # BLD AUTO: 8.6 10E9/L (ref 1.6–8.3)
NEUTROPHILS NFR BLD AUTO: 82 %
NITRATE UR QL: NEGATIVE
NON-SQ EPI CELLS #/AREA URNS LPF: NORMAL /LPF
PH UR STRIP: 6.5 PH (ref 5–7)
RBC #/AREA URNS AUTO: NORMAL /HPF
SOURCE: ABNORMAL
SP GR UR STRIP: 1.01 (ref 1–1.03)
UROBILINOGEN UR STRIP-ACNC: 0.2 EU/DL (ref 0.2–1)
WBC # BLD AUTO: 10.5 10E9/L (ref 4–11)
WBC #/AREA URNS AUTO: NORMAL /HPF

## 2019-04-10 PROCEDURE — 36415 COLL VENOUS BLD VENIPUNCTURE: CPT | Performed by: FAMILY MEDICINE

## 2019-04-10 PROCEDURE — 74019 RADEX ABDOMEN 2 VIEWS: CPT

## 2019-04-10 PROCEDURE — 81001 URINALYSIS AUTO W/SCOPE: CPT | Performed by: FAMILY MEDICINE

## 2019-04-10 PROCEDURE — 99214 OFFICE O/P EST MOD 30 MIN: CPT | Performed by: FAMILY MEDICINE

## 2019-04-10 PROCEDURE — 85048 AUTOMATED LEUKOCYTE COUNT: CPT | Performed by: FAMILY MEDICINE

## 2019-04-10 PROCEDURE — 85004 AUTOMATED DIFF WBC COUNT: CPT | Performed by: FAMILY MEDICINE

## 2019-04-10 RX ORDER — OMEGA-3 FATTY ACIDS/FISH OIL 300-1000MG
200 CAPSULE ORAL EVERY 4 HOURS PRN
COMMUNITY

## 2019-04-10 RX ORDER — ACETAMINOPHEN 325 MG/1
325-650 TABLET ORAL EVERY 6 HOURS PRN
COMMUNITY
End: 2022-08-08

## 2019-04-10 NOTE — PROGRESS NOTES
SUBJECTIVE  HPI:  Minerva Cardona is a 33 year old female who presents with the CC of abdominal/pelvic pain.    Pain is located in the RLQ and LLQ area, with radiation to None.  The pain is characterized as sharp, and at worst is a level 5 on a scale of 1-10.  Pain has been present for 26 hour(s) and is slowly improving.  EXACERBATING FACTORS: movement/walking, she has no history of constipation but he said he did not have a bowel movement and today had a very small bowel movement.  Patient had a normal cycle and her last period was 2 weeks back and she is sure that she is not pregnant  RELIEVING FACTORS: nothing.  ASSOCIATED SX: none.    Past Medical History:   Diagnosis Date     Kidney infection      UTI (urinary tract infection)      Current Outpatient Medications   Medication Sig Dispense Refill     acetaminophen (TYLENOL) 325 MG tablet Take 325-650 mg by mouth every 6 hours as needed for mild pain       ibuprofen (ADVIL/MOTRIN) 200 MG capsule Take 200 mg by mouth every 4 hours as needed for fever       IUD's (PARAGARD INTRAUTERINE COPPER) 1 each by Intrauterine route once.       Social History     Tobacco Use     Smoking status: Never Smoker     Smokeless tobacco: Never Used   Substance Use Topics     Alcohol use: Yes     Comment: on occasion     Family History   Problem Relation Age of Onset     Abdominal Aortic Aneurysm Mother      Cancer Maternal Grandmother         lymphoma     Cancer - colorectal Maternal Grandfather      Alcohol/Drug Paternal Grandfather         alcoholic         ROS:  10 point ROS of systems including Constitutional, Eyes, Respiratory, Cardiovascular, Genitourinary, Integumentary, Muscularskeletal, Psychiatric were all negative except for pertinent positives noted in my HPI           OBJECTIVE:  /80 (BP Location: Right arm, Patient Position: Chair, Cuff Size: Adult Large)   Pulse 102   Temp 97.4  F (36.3  C) (Oral)   SpO2 99%   GENERAL APPEARANCE: healthy, alert and no  distress  EYES: EOMI,  PERRL, conjunctiva clear  HENT: ear canals and TM's normal.  Nose and mouth without ulcers, erythema or lesions  NECK: supple, nontender, no lymphadenopathy  RESP: lungs clear to auscultation - no rales, rhonchi or wheezes  CV: regular rates and rhythm, normal S1 S2, no murmur noted  ABDOMEN:  Soft,LLQ tender, no HSM or masses and bowel sounds normal  SKIN: no suspicious lesions or rashes  PSYCH: mentation appears normal    ASSESSMENT:  Minerva was seen today for urgent care and abdominal pain.    Diagnoses and all orders for this visit:    Lower abdominal pain  -     *UA reflex to Microscopic  -     WBC with Diff  -     XR Abdomen 2 Views  -     Urine Microscopic    Constipation, unspecified constipation type      Differential diagnosis constipation/UTI/kidney stones/ovarian cyst/musculoskeletal pain  Discussed with patient about the test results x-ray did show moderate amount of stool in the UA was within normal limits except noticing some ketone Colon  WBC did show slight elevation in the neutrophil count discussed with patient about the result  At this point I think the pain is possibly related to constipation advised patient to start on a stool softener and increase fiber intake  Can do Tylenol for the pain  Even after doing this and having a good bowel movement if the pain still persist patient was advised to follow-up in the ER to consider getting an ultrasound to rule out if there is ovarian cyst I doubt its ovarian torsion as the pain has improved than before.   Reviewed with patient that if notices any worsening of the pain should definitely follow-up in the ER  See Orders in Epic    Savannah Del Vlale MD

## 2019-06-14 ENCOUNTER — OFFICE VISIT (OUTPATIENT)
Dept: OBGYN | Facility: CLINIC | Age: 34
End: 2019-06-14
Payer: COMMERCIAL

## 2019-06-14 VITALS — WEIGHT: 182.6 LBS | SYSTOLIC BLOOD PRESSURE: 114 MMHG | BODY MASS INDEX: 29.47 KG/M2 | DIASTOLIC BLOOD PRESSURE: 80 MMHG

## 2019-06-14 DIAGNOSIS — Z30.432 ENCOUNTER FOR IUD REMOVAL: Primary | ICD-10-CM

## 2019-06-14 DIAGNOSIS — Z12.4 ENCOUNTER FOR SCREENING FOR CERVICAL CANCER: ICD-10-CM

## 2019-06-14 DIAGNOSIS — N84.1 CERVICAL POLYP: ICD-10-CM

## 2019-06-14 DIAGNOSIS — Z11.51 SPECIAL SCREENING EXAMINATION FOR HUMAN PAPILLOMAVIRUS (HPV): ICD-10-CM

## 2019-06-14 PROCEDURE — 57500 BIOPSY OF CERVIX: CPT | Mod: 59 | Performed by: OBSTETRICS & GYNECOLOGY

## 2019-06-14 PROCEDURE — 88305 TISSUE EXAM BY PATHOLOGIST: CPT | Performed by: OBSTETRICS & GYNECOLOGY

## 2019-06-14 PROCEDURE — G0145 SCR C/V CYTO,THINLAYER,RESCR: HCPCS | Performed by: OBSTETRICS & GYNECOLOGY

## 2019-06-14 PROCEDURE — 87624 HPV HI-RISK TYP POOLED RSLT: CPT | Performed by: OBSTETRICS & GYNECOLOGY

## 2019-06-14 PROCEDURE — 58301 REMOVE INTRAUTERINE DEVICE: CPT | Performed by: OBSTETRICS & GYNECOLOGY

## 2019-06-14 NOTE — PROGRESS NOTES
INDICATIONS:                                                      Minerva Cardona is a 33 year old female,, Patient's last menstrual period was 2019 (exact date). who presents today for Paragard  IUD removal. Her current IUD was placed 11 years ago. She has not had problems with the IUD. She requests removal of the IUD because her  had a vasectomy. Incidentally, though we have not seen her before, she is due for a pap smear. She is interested in completing that today as well.      PROCEDURE:                                                      A speculum exam was performed and the cervix was visualized. The IUD string was visualized. There is also a 9-10 mm cervical polyp present. Pap obtained. Using ring forceps, the stringwas grasped and the IUD removed intact. The cervical polyp was then grasped with the ring forceps and removed easily at its base. No bleeding noted.    POST PROCEDURE:                                                      The patient tolerated the procedure well. Patient was discharged in stable condition.    If Pap and HPV are both negative, repeat both in 5 years per ASCCP guidelines.    Will send results of cervical polyp pathology.    Raysa Olson MD

## 2019-06-14 NOTE — NURSING NOTE
"Chief Complaint   Patient presents with     IUD     Paragard removal   Inserted 2008       Initial /80 (BP Location: Left arm, Patient Position: Chair, Cuff Size: Adult Regular)   Wt 82.8 kg (182 lb 9.6 oz)   LMP 2019 (Exact Date)   Breastfeeding? No   BMI 29.47 kg/m   Estimated body mass index is 29.47 kg/m  as calculated from the following:    Height as of 10/26/18: 1.676 m (5' 6\").    Weight as of this encounter: 82.8 kg (182 lb 9.6 oz).  BP completed using cuff size: regular    Questioned patient about current smoking habits.  Pt. has never smoked.          The following HM Due: pap smear      Paragard IUD removal .. Inserted 2008        Pia Dang CMA on 2019 at 3:03 PM       "

## 2019-06-14 NOTE — LETTER
July 16, 2019      Minerva Cardona  [address]  [place] [address]    Dear ,      This letter is in regards to the PAP smear and HPV (Human Papillomavirus) test you had done recently. Your PAP test result is normal, but your HPV (Human Papillomavirus) test was positive.     About 80 percent of women have been exposed to HPV virus throughout their lifetime. There is no medication for the treatment of HPV. Typically your own immune system gets rid of the virus before it does harm. HPV is spread by direct skin-to-skin contact, including sexual intercourse, oral sex, anal sex, or any other contact involving the genital area (example: hand to genital contact). It is not possible to become infected with HPV by touching an object, such as a toilet seat. Most people who are infected with HPV have no signs or symptoms.    Things that you can do to boost your immune system and help your body get rid of HPV: get plenty of rest, eat a well-balanced diet of healthy foods, stop smoking, exercise regularly and decrease stress.    Please return in 1 year to repeat your pap smear and HPV test.     If you have additional questions regarding this result, please call our registered nurse, Rama at [phone].    Sincerely,      Raysa Olson MD/[facility]

## 2019-06-18 LAB — COPATH REPORT: NORMAL

## 2019-06-19 LAB
COPATH REPORT: NORMAL
PAP: NORMAL

## 2019-06-20 ENCOUNTER — RESULT FOLLOW UP (OUTPATIENT)
Dept: OBGYN | Facility: CLINIC | Age: 34
End: 2019-06-20

## 2019-06-20 DIAGNOSIS — R87.810 CERVICAL HIGH RISK HPV (HUMAN PAPILLOMAVIRUS) TEST POSITIVE: ICD-10-CM

## 2019-06-20 LAB
FINAL DIAGNOSIS: ABNORMAL
HPV HR 12 DNA CVX QL NAA+PROBE: POSITIVE
HPV16 DNA SPEC QL NAA+PROBE: NEGATIVE
HPV18 DNA SPEC QL NAA+PROBE: NEGATIVE
SPECIMEN DESCRIPTION: ABNORMAL
SPECIMEN SOURCE CVX/VAG CYTO: ABNORMAL

## 2019-06-20 NOTE — PROGRESS NOTES
6/14/19 NIL, +HR HPV, not 16/18. Plan 1 yr co-test due by 6/14/20.  6/24/19 Left message to call back  7/2/19 Left 2nd msg to call back  7/9/19 [facility] pap result letter released  07/16/19 Result letter sent at request of RN. ([facility])

## 2020-03-01 ENCOUNTER — HEALTH MAINTENANCE LETTER (OUTPATIENT)
Age: 35
End: 2020-03-01

## 2020-12-09 ENCOUNTER — PATIENT OUTREACH (OUTPATIENT)
Dept: FAMILY MEDICINE | Facility: CLINIC | Age: 35
End: 2020-12-09

## 2020-12-09 NOTE — TELEPHONE ENCOUNTER
6/14/19 NIL, +HR HPV, not 1618. Plan 1 yr co-test    11/9/20 Reminder MyChart  12/09/20: Reminder letter sent.

## 2020-12-14 ENCOUNTER — HEALTH MAINTENANCE LETTER (OUTPATIENT)
Age: 35
End: 2020-12-14

## 2021-01-07 ENCOUNTER — PATIENT OUTREACH (OUTPATIENT)
Dept: OBGYN | Facility: CLINIC | Age: 36
End: 2021-01-07

## 2021-01-07 PROBLEM — R87.810 CERVICAL HIGH RISK HPV (HUMAN PAPILLOMAVIRUS) TEST POSITIVE: Status: ACTIVE | Noted: 2019-06-14

## 2021-02-04 NOTE — TELEPHONE ENCOUNTER
Braeden Olson,  Patient is lost to pap tracking follow-up. Attempts to contact pt have been made per reminder process and there has been no reply and/or no appt scheduled.    Pap Hx:  6/14/19 NIL, +HR HPV, not 1618. Plan 1 yr co-test    11/9/20 Reminder MyChart not viewed.   12/09/20: Reminder letter sent.  01/7/21 Reminder call- lm  02/4/21 Lost to follow-up for pap tracking, michael routed to provider

## 2021-04-18 ENCOUNTER — HEALTH MAINTENANCE LETTER (OUTPATIENT)
Age: 36
End: 2021-04-18

## 2021-05-06 ENCOUNTER — OFFICE VISIT (OUTPATIENT)
Dept: OBGYN | Facility: CLINIC | Age: 36
End: 2021-05-06
Payer: COMMERCIAL

## 2021-05-06 VITALS
HEART RATE: 80 BPM | WEIGHT: 204 LBS | DIASTOLIC BLOOD PRESSURE: 80 MMHG | BODY MASS INDEX: 32.78 KG/M2 | SYSTOLIC BLOOD PRESSURE: 128 MMHG | HEIGHT: 66 IN

## 2021-05-06 DIAGNOSIS — Z01.419 WOMEN'S ANNUAL ROUTINE GYNECOLOGICAL EXAMINATION: Primary | ICD-10-CM

## 2021-05-06 DIAGNOSIS — R87.810 CERVICAL HIGH RISK HPV (HUMAN PAPILLOMAVIRUS) TEST POSITIVE: ICD-10-CM

## 2021-05-06 DIAGNOSIS — F98.8 ATTENTION DEFICIT DISORDER, UNSPECIFIED HYPERACTIVITY PRESENCE: ICD-10-CM

## 2021-05-06 DIAGNOSIS — Z12.4 SCREENING FOR CERVICAL CANCER: ICD-10-CM

## 2021-05-06 PROCEDURE — 88175 CYTOPATH C/V AUTO FLUID REDO: CPT | Performed by: OBSTETRICS & GYNECOLOGY

## 2021-05-06 PROCEDURE — 87624 HPV HI-RISK TYP POOLED RSLT: CPT | Performed by: OBSTETRICS & GYNECOLOGY

## 2021-05-06 PROCEDURE — 99395 PREV VISIT EST AGE 18-39: CPT | Performed by: OBSTETRICS & GYNECOLOGY

## 2021-05-06 ASSESSMENT — MIFFLIN-ST. JEOR: SCORE: 1637.09

## 2021-05-06 NOTE — PROGRESS NOTES
SUBJECTIVE:                                                      Minerva is a 35 year old  female who presents for annual exam.     Patient's last menstrual period was 2021 (approximate).. Menses are regular q 28-30 days and normal lasting fewer days now that the copper IUD is out.  Using vasectomy for contraception.  She is not currently considering pregnancy.  Besides routine health maintenance, she would like to discuss getting tested for ADHD. She has done some reading about this, and thinks she likely has this diagnosis. Never tested or treated for it, notices it mostly with regard to certain executive functions.  GYNECOLOGIC HISTORY:    Minerva is sexually active with 1 male partner(s) and is currently in a monogamous relationship.      History sexually transmitted infections:HPV    History of abnormal Pap smear: YES - updated in Problem List and Health Maintenance accordingly  Family history of breast CA: No  Family history of uterine/ovarian CA: No    Family history of colon CA: Yes (Please explain): mGF      HISTORY:  OB History    Para Term  AB Living   2 0 0 0 0 2   SAB TAB Ectopic Multiple Live Births   0 0 0 0 0      # Outcome Date GA Lbr Milad/2nd Weight Sex Delivery Anes PTL Lv   2             1               Past Medical History:   Diagnosis Date     Cervical high risk HPV (human papillomavirus) test positive 2019     Kidney infection      UTI (urinary tract infection)      Past Surgical History:   Procedure Laterality Date     NO HISTORY OF SURGERY       Family History   Problem Relation Age of Onset     Abdominal Aortic Aneurysm Mother      Cancer Maternal Grandmother         lymphoma     Cancer - colorectal Maternal Grandfather      Alcohol/Drug Paternal Grandfather         alcoholic     Social History     Socioeconomic History     Marital status:      Spouse name: None     Number of children: None     Years of education: None     Highest  "education level: None   Occupational History     None   Social Needs     Financial resource strain: None     Food insecurity     Worry: None     Inability: None     Transportation needs     Medical: None     Non-medical: None   Tobacco Use     Smoking status: Never Smoker     Smokeless tobacco: Never Used   Substance and Sexual Activity     Alcohol use: Yes     Comment: on occasion     Drug use: No     Sexual activity: Yes     Partners: Male     Birth control/protection: Male Surgical   Lifestyle     Physical activity     Days per week: None     Minutes per session: None     Stress: None   Relationships     Social connections     Talks on phone: None     Gets together: None     Attends [employer] service: None     Active member of club or organization: None     Attends meetings of clubs or organizations: None     Relationship status: None     Intimate partner violence     Fear of current or ex partner: None     Emotionally abused: None     Physically abused: None     Forced sexual activity: None   Other Topics Concern     Parent/sibling w/ CABG, MI or angioplasty before 65F 55M? Not Asked   Social History Narrative     None       Current Outpatient Medications:      acetaminophen (TYLENOL) 325 MG tablet, Take 325-650 mg by mouth every 6 hours as needed for mild pain, Disp: , Rfl:      ibuprofen (ADVIL/MOTRIN) 200 MG capsule, Take 200 mg by mouth every 4 hours as needed for fever, Disp: , Rfl:      loratadine-pseudoePHEDrine (CLARITIN-D 24 HOUR)  MG 24 hr tablet, Take 1 tablet by mouth, Disp: , Rfl:    No Known Allergies    Past medical, surgical, social and family history were reviewed and updated in EPIC.    ROS:   12 point review of systems negative other than symptoms noted below.      OBJECTIVE:                                                      EXAM:  /80   Pulse 80   Ht 1.676 m (5' 6\")   Wt 92.5 kg (204 lb)   LMP 04/06/2021 (Approximate)   Breastfeeding No   BMI 32.93 kg/m     BMI: Body mass " index is 32.93 kg/m .  General: Alert and oriented, no distress.  Psychiatric: Mood and affect within normal limits.  Skin: Warm and dry, no lesions, rashes or discolorations.  Neck: Neck supple. Thyroid palpbably normal in size and without nodularity.  Cardiovascular: Regular rate and rhythm, no murmurs, rubs or gallops.   Lungs:  Clear to auscultation bilaterally, breathing is unlabored.  Breasts:  Symmetric, no skin changes.  No dominant masses bilaterally.   Lymph:  No cervical, supraclavicular, infraclavicular, axillary or inguinal lymphadenopathy palpable.   Abdomen: Soft, nontender, no hepatosplenomegaly, no rebound or guarding, no masses, no hernias.   Vulva:  No external lesions, normal female hair distribution, no inguinal adenopathy.    Urethra:  Midline, non-tender, well supported, no discharge  Vagina:  Well-estrogenized, no abnormal discharge, no lesions  Cervix: no lesions, no discharge  Uterus:  anteverted, smooth contour, without enlargement, mobile, and without tenderness  Ovaries:  No masses appreciated, non-tender, mobile  Rectal Exam: deferred  Musculoskeletal: extremities normal      COUNSELING:   Reviewed preventive health counseling, as reflected in patient instructions       Regular exercise       Healthy diet/nutrition   reports that she has never smoked. She has never used smokeless tobacco.        ASSESSMENT/PLAN:                                                      35 year old female with satisfactory annual exam  (Z01.419) Women's annual routine gynecological examination  (primary encounter diagnosis)  Comment:   Plan: follow up yearly. Mammograms at 41 yo.    (R87.810) Cervical high risk HPV (human papillomavirus) test positive  Comment:   Plan: Pap imaged thin layer screen with HPV -         recommended age 30 - 65 years (select HPV order        below), HPV High Risk Types DNA Cervical        discussed possible colposcopy if HPV is still positive.    (F98.8) Attention deficit  disorder, unspecified hyperactivity presence  Comment:   Plan: MENTAL HEALTH REFERRAL  - Adult; Outpatient         Treatment, Assessments and Testing; ADHD; [facility]:         [facility] [phone];         Individual/Couples/Family/Group Therapy/Health         Psychology; [facility]: [facility]         [phone]; We w...        Referral given for testing. Would need to be managed by primary care provider.    (Z12.4) Screening for cervical cancer  Comment:   Plan: Pap imaged thin layer screen with HPV -         recommended age 30 - 65 years (select HPV order        below), HPV High Risk Types DNA Cervical              Raysa Olson MD

## 2021-05-06 NOTE — NURSING NOTE
"Chief Complaint   Patient presents with     Physical       Initial /80   Pulse 80   Ht 1.676 m (5' 6\")   Wt 92.5 kg (204 lb)   LMP 2021 (Approximate)   Breastfeeding No   BMI 32.93 kg/m   Estimated body mass index is 32.93 kg/m  as calculated from the following:    Height as of this encounter: 1.676 m (5' 6\").    Weight as of this encounter: 92.5 kg (204 lb).  BP completed using cuff size: regular    Questioned patient about current smoking habits.  Pt. has never smoked.          The following HM Due: pap smear      The following patient reported/Care Every where data was sent to:  P ABSTRACT QUALITY INITIATIVES [[id number]]  Marylin Garcia LPN               "

## 2021-05-10 LAB
COPATH REPORT: NORMAL
PAP: NORMAL

## 2021-05-12 LAB
FINAL DIAGNOSIS: NORMAL
HPV HR 12 DNA CVX QL NAA+PROBE: NEGATIVE
HPV16 DNA SPEC QL NAA+PROBE: NEGATIVE
HPV18 DNA SPEC QL NAA+PROBE: NEGATIVE
SPECIMEN DESCRIPTION: NORMAL
SPECIMEN SOURCE CVX/VAG CYTO: NORMAL

## 2021-05-13 ENCOUNTER — PATIENT OUTREACH (OUTPATIENT)
Dept: OBGYN | Facility: CLINIC | Age: 36
End: 2021-05-13
Payer: COMMERCIAL

## 2021-05-13 DIAGNOSIS — R87.810 CERVICAL HIGH RISK HPV (HUMAN PAPILLOMAVIRUS) TEST POSITIVE: ICD-10-CM

## 2021-05-13 NOTE — TELEPHONE ENCOUNTER
6/14/19 NIL, +HR HPV, not 1618. Plan 1 yr co-test    11/9/20 Reminder MyChart not viewed.   12/09/20: Reminder letter sent.  01/7/21 Reminder call- lm  02/4/21 Lost to follow-up for pap tracking, fyi routed to provider  5/6/2021 NIL pap, neg HPV. Plan cotest in 1 year.

## 2021-08-27 ENCOUNTER — OFFICE VISIT (OUTPATIENT)
Dept: PEDIATRICS | Facility: CLINIC | Age: 36
End: 2021-08-27
Payer: COMMERCIAL

## 2021-08-27 VITALS
WEIGHT: 211.7 LBS | BODY MASS INDEX: 34.17 KG/M2 | OXYGEN SATURATION: 98 % | SYSTOLIC BLOOD PRESSURE: 118 MMHG | DIASTOLIC BLOOD PRESSURE: 64 MMHG | HEART RATE: 75 BPM | TEMPERATURE: 98.1 F

## 2021-08-27 DIAGNOSIS — T25.221A PARTIAL THICKNESS BURN OF RIGHT FOOT, INITIAL ENCOUNTER: Primary | ICD-10-CM

## 2021-08-27 PROCEDURE — 16020 DRESS/DEBRID P-THICK BURN S: CPT | Performed by: INTERNAL MEDICINE

## 2021-08-27 PROCEDURE — 99213 OFFICE O/P EST LOW 20 MIN: CPT | Mod: 25 | Performed by: INTERNAL MEDICINE
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
## 2021-10-02 ENCOUNTER — HEALTH MAINTENANCE LETTER (OUTPATIENT)
Age: 36
End: 2021-10-02

## 2022-07-09 ENCOUNTER — HEALTH MAINTENANCE LETTER (OUTPATIENT)
Age: 37
End: 2022-07-09

## 2022-08-08 ENCOUNTER — OFFICE VISIT (OUTPATIENT)
Dept: PEDIATRICS | Facility: CLINIC | Age: 37
End: 2022-08-08
Payer: COMMERCIAL

## 2022-08-08 VITALS
TEMPERATURE: 99.2 F | HEART RATE: 76 BPM | OXYGEN SATURATION: 99 % | BODY MASS INDEX: 33.3 KG/M2 | WEIGHT: 207.2 LBS | RESPIRATION RATE: 16 BRPM | DIASTOLIC BLOOD PRESSURE: 64 MMHG | HEIGHT: 66 IN | SYSTOLIC BLOOD PRESSURE: 116 MMHG

## 2022-08-08 DIAGNOSIS — M25.50 MULTIPLE JOINT PAIN: ICD-10-CM

## 2022-08-08 DIAGNOSIS — R19.8 ALTERNATING CONSTIPATION AND DIARRHEA: ICD-10-CM

## 2022-08-08 DIAGNOSIS — Z11.59 NEED FOR HEPATITIS C SCREENING TEST: ICD-10-CM

## 2022-08-08 DIAGNOSIS — Z00.00 ROUTINE GENERAL MEDICAL EXAMINATION AT A HEALTH CARE FACILITY: Primary | ICD-10-CM

## 2022-08-08 DIAGNOSIS — Z11.4 SCREENING FOR HIV (HUMAN IMMUNODEFICIENCY VIRUS): ICD-10-CM

## 2022-08-08 DIAGNOSIS — R41.840 CONCENTRATION DEFICIT: ICD-10-CM

## 2022-08-08 LAB
CRP SERPL-MCNC: <3 MG/L
ERYTHROCYTE [DISTWIDTH] IN BLOOD BY AUTOMATED COUNT: 13.5 % (ref 10–15)
ERYTHROCYTE [SEDIMENTATION RATE] IN BLOOD BY WESTERGREN METHOD: 8 MM/HR (ref 0–20)
HCT VFR BLD AUTO: 38.1 % (ref 35–47)
HGB BLD-MCNC: 13 G/DL (ref 11.7–15.7)
MCH RBC QN AUTO: 30.2 PG (ref 26.5–33)
MCHC RBC AUTO-ENTMCNC: 34.1 G/DL (ref 31.5–36.5)
MCV RBC AUTO: 89 FL (ref 78–100)
PLATELET # BLD AUTO: 241 10E3/UL (ref 150–450)
RBC # BLD AUTO: 4.3 10E6/UL (ref 3.8–5.2)
WBC # BLD AUTO: 5.1 10E3/UL (ref 4–11)

## 2022-08-08 PROCEDURE — 90471 IMMUNIZATION ADMIN: CPT | Performed by: INTERNAL MEDICINE

## 2022-08-08 PROCEDURE — 85027 COMPLETE CBC AUTOMATED: CPT | Performed by: INTERNAL MEDICINE

## 2022-08-08 PROCEDURE — 86803 HEPATITIS C AB TEST: CPT | Performed by: INTERNAL MEDICINE

## 2022-08-08 PROCEDURE — 36415 COLL VENOUS BLD VENIPUNCTURE: CPT | Performed by: INTERNAL MEDICINE

## 2022-08-08 PROCEDURE — 85652 RBC SED RATE AUTOMATED: CPT | Performed by: INTERNAL MEDICINE

## 2022-08-08 PROCEDURE — 86200 CCP ANTIBODY: CPT | Performed by: INTERNAL MEDICINE

## 2022-08-08 PROCEDURE — 90715 TDAP VACCINE 7 YRS/> IM: CPT | Performed by: INTERNAL MEDICINE

## 2022-08-08 PROCEDURE — 99214 OFFICE O/P EST MOD 30 MIN: CPT | Mod: 25 | Performed by: INTERNAL MEDICINE

## 2022-08-08 PROCEDURE — 87389 HIV-1 AG W/HIV-1&-2 AB AG IA: CPT | Performed by: INTERNAL MEDICINE

## 2022-08-08 PROCEDURE — 86140 C-REACTIVE PROTEIN: CPT | Performed by: INTERNAL MEDICINE

## 2022-08-08 PROCEDURE — 80053 COMPREHEN METABOLIC PANEL: CPT | Performed by: INTERNAL MEDICINE

## 2022-08-08 PROCEDURE — 86431 RHEUMATOID FACTOR QUANT: CPT | Performed by: INTERNAL MEDICINE

## 2022-08-08 PROCEDURE — 99395 PREV VISIT EST AGE 18-39: CPT | Mod: 25 | Performed by: INTERNAL MEDICINE

## 2022-08-08 PROCEDURE — 86364 TISS TRNSGLTMNASE EA IG CLAS: CPT | Performed by: INTERNAL MEDICINE

## 2022-08-08 RX ORDER — FLUTICASONE PROPIONATE 50 MCG
1 SPRAY, SUSPENSION (ML) NASAL DAILY
COMMUNITY

## 2022-08-08 SDOH — ECONOMIC STABILITY: TRANSPORTATION INSECURITY
IN THE PAST 12 MONTHS, HAS THE LACK OF TRANSPORTATION KEPT YOU FROM MEDICAL APPOINTMENTS OR FROM GETTING MEDICATIONS?: NO

## 2022-08-08 SDOH — ECONOMIC STABILITY: FOOD INSECURITY: WITHIN THE PAST 12 MONTHS, THE FOOD YOU BOUGHT JUST DIDN'T LAST AND YOU DIDN'T HAVE MONEY TO GET MORE.: NEVER TRUE

## 2022-08-08 SDOH — ECONOMIC STABILITY: TRANSPORTATION INSECURITY
IN THE PAST 12 MONTHS, HAS LACK OF TRANSPORTATION KEPT YOU FROM MEETINGS, WORK, OR FROM GETTING THINGS NEEDED FOR DAILY LIVING?: NO

## 2022-08-08 SDOH — HEALTH STABILITY: PHYSICAL HEALTH: ON AVERAGE, HOW MANY DAYS PER WEEK DO YOU ENGAGE IN MODERATE TO STRENUOUS EXERCISE (LIKE A BRISK WALK)?: 2 DAYS

## 2022-08-08 SDOH — ECONOMIC STABILITY: INCOME INSECURITY: IN THE LAST 12 MONTHS, WAS THERE A TIME WHEN YOU WERE NOT ABLE TO PAY THE MORTGAGE OR RENT ON TIME?: NO

## 2022-08-08 SDOH — HEALTH STABILITY: PHYSICAL HEALTH: ON AVERAGE, HOW MANY MINUTES DO YOU ENGAGE IN EXERCISE AT THIS LEVEL?: 30 MIN

## 2022-08-08 SDOH — ECONOMIC STABILITY: FOOD INSECURITY: WITHIN THE PAST 12 MONTHS, YOU WORRIED THAT YOUR FOOD WOULD RUN OUT BEFORE YOU GOT MONEY TO BUY MORE.: NEVER TRUE

## 2022-08-08 SDOH — ECONOMIC STABILITY: INCOME INSECURITY: HOW HARD IS IT FOR YOU TO PAY FOR THE VERY BASICS LIKE FOOD, HOUSING, MEDICAL CARE, AND HEATING?: NOT VERY HARD

## 2022-08-08 ASSESSMENT — SOCIAL DETERMINANTS OF HEALTH (SDOH)
HOW OFTEN DO YOU ATTEND CHURCH OR RELIGIOUS SERVICES?: NEVER
IN A TYPICAL WEEK, HOW MANY TIMES DO YOU TALK ON THE PHONE WITH FAMILY, FRIENDS, OR NEIGHBORS?: MORE THAN THREE TIMES A WEEK
DO YOU BELONG TO ANY CLUBS OR ORGANIZATIONS SUCH AS CHURCH GROUPS UNIONS, FRATERNAL OR ATHLETIC GROUPS, OR SCHOOL GROUPS?: YES
HOW OFTEN DO YOU GET TOGETHER WITH FRIENDS OR RELATIVES?: NEVER

## 2022-08-08 ASSESSMENT — ENCOUNTER SYMPTOMS
BREAST MASS: 0
COUGH: 0
WEAKNESS: 0
NAUSEA: 1
DIZZINESS: 1
DYSURIA: 0
FEVER: 0
ABDOMINAL PAIN: 1
NERVOUS/ANXIOUS: 0
HEMATOCHEZIA: 1
PARESTHESIAS: 0
EYE PAIN: 0
HEMATURIA: 0
ARTHRALGIAS: 1
JOINT SWELLING: 0
FREQUENCY: 0
HEADACHES: 0
SORE THROAT: 0
CONSTIPATION: 1
HEARTBURN: 1
MYALGIAS: 1
CHILLS: 0
SHORTNESS OF BREATH: 1
DIARRHEA: 1

## 2022-08-08 ASSESSMENT — LIFESTYLE VARIABLES
HOW OFTEN DO YOU HAVE SIX OR MORE DRINKS ON ONE OCCASION: WEEKLY
SKIP TO QUESTIONS 9-10: 0
HOW OFTEN DO YOU HAVE A DRINK CONTAINING ALCOHOL: 2-3 TIMES A WEEK
AUDIT-C TOTAL SCORE: 7
HOW MANY STANDARD DRINKS CONTAINING ALCOHOL DO YOU HAVE ON A TYPICAL DAY: 3 OR 4

## 2022-08-08 ASSESSMENT — PAIN SCALES - GENERAL: PAINLEVEL: MILD PAIN (2)

## 2022-08-08 NOTE — PROGRESS NOTES
SUBJECTIVE:   CC: Minerva Cardona is an 37 year old woman who presents for preventive health visit.     Patient has been advised of split billing requirements and indicates understanding: Yes  Healthy Habits:     Getting at least 3 servings of Calcium per day:  NO    Bi-annual eye exam:  Yes    Dental care twice a year:  Yes    Sleep apnea or symptoms of sleep apnea:  None    Diet:  Regular (no restrictions)    Frequency of exercise:  1 day/week    Duration of exercise:  15-30 minutes    Taking medications regularly:  Not Applicable    Medication side effects:  Not applicable    PHQ-2 Total Score: 0    Additional concerns today:  Yes    Does OB with Dr. Olson- due for pap f/up and will schedule.    Has recurrent UTIs - mostly have gone away. Had to go to ED once for kidney infection.     # Allergic rhinitis  - flonase spring and fall    # Pain in shoulders, hips from sleeping   - tends to sleep cramped up  - feels joint pain in different place at different times  - worse in the morning and feels like she has to crack a lot  - takes an hour or two for joints to loosen up  - fingers can get sore - she does repetitive typing  - sometimes fingers do swell -> can happen at the end of the day  - taking ibuprofen 2-3x a week    # Carpal tunnel  - does have braces she wears at night    # Digestion issues  - fluctuates between constipation/diarrhea every few days  - drinks a lot of water or hard to swallow  - when she has diarrhea it's pretty painful  - hasn't noticed any patterns - for a while thought lactose intolerant but elimination wasn't helpful. Hasn't tried gluten free.     # ? Of ADHD  - talked to Dr. Olson to get a referral   - hard to start doing things   - would like a referral again.    Today's PHQ-2 Score:   PHQ-2 ( 1999 [employer]) 8/8/2022   Q1: Little interest or pleasure in doing things 0   Q2: Feeling down, depressed or hopeless 0   PHQ-2 Score 0   PHQ-2 Total Score (12-17 Years)- Positive if 3 or more  points; Administer PHQ-A if positive -   Q1: Little interest or pleasure in doing things Not at all   Q2: Feeling down, depressed or hopeless Not at all   PHQ-2 Score 0       Abuse: Current or Past (Physical, Sexual or Emotional) - No  Do you feel safe in your environment? Yes    Have you ever done Advance Care Planning? (For example, a Health Directive, POLST, or a discussion with a medical provider or your loved ones about your wishes): No, advance care planning information given to patient to review.  Patient declined advance care planning discussion at this time.    Social History     Tobacco Use     Smoking status: Never Smoker     Smokeless tobacco: Never Used   Substance Use Topics     Alcohol use: Yes     Comment: on occasion     If you drink alcohol do you typically have >3 drinks per day or >7 drinks per week? No    Alcohol Use 8/8/2022   Prescreen: >3 drinks/day or >7 drinks/week? Yes   Prescreen: >3 drinks/day or >7 drinks/week? -   AUDIT SCORE  9     Reviewed orders with patient.  Reviewed health maintenance and updated orders accordingly - Yes    Breast Cancer Screening:  Any new diagnosis of family breast, ovarian, or bowel cancer? No    FHS-7:   Breast CA Risk Assessment (FHS-7) 8/8/2022   Do you have 2 or more relatives with breast and/or bowel cancer? No       Patient under 40 years of age: Routine Mammogram Screening not recommended.   Pertinent mammograms are reviewed under the imaging tab.    History of abnormal Pap smear: See Epic.  PAP / HPV Latest Ref Rng & Units 5/6/2021 6/14/2019 12/20/2013   PAP (Historical) - NIL NIL NIL   HPV16 NEG:Negative Negative Negative -   HPV18 NEG:Negative Negative Negative -   HRHPV NEG:Negative Negative Positive(A) -     Reviewed and updated as needed this visit by clinical staff   Tobacco  Allergies  Meds   Med Hx  Surg Hx  Fam Hx  Soc Hx          Reviewed and updated as needed this visit by Provider     Meds     Fam Hx             Review of  "Systems  CONSTITUTIONAL: NEGATIVE for fever, chills, change in weight  INTEGUMENTARU/SKIN: NEGATIVE for worrisome rashes, moles or lesions  EYES: NEGATIVE for vision changes or irritation  ENT: NEGATIVE for ear, mouth and throat problems  RESP: NEGATIVE for significant cough or SOB  BREAST: NEGATIVE for masses, tenderness or discharge  CV: NEGATIVE for chest pain, palpitations or peripheral edema  GI: NEGATIVE for nausea, abdominal pain, heartburn, or change in bowel habits  : NEGATIVE for unusual urinary or vaginal symptoms. Periods are regular.  MUSCULOSKELETAL: NEGATIVE for significant arthralgias or myalgia  NEURO: NEGATIVE for weakness, dizziness or paresthesias  PSYCHIATRIC: NEGATIVE for changes in mood or affect     OBJECTIVE:   /64   Pulse 76   Temp 99.2  F (37.3  C) (Tympanic)   Resp 16   Ht 1.664 m (5' 5.5\")   Wt 94 kg (207 lb 3.2 oz)   LMP 07/14/2022   SpO2 99%   BMI 33.96 kg/m    Physical Exam  GENERAL: healthy, alert and no distress  EYES: Eyes grossly normal to inspection, PERRL and conjunctivae and sclerae normal  HENT: ear canals and TM's normal, nose and mouth without ulcers or lesions  NECK: no adenopathy, no asymmetry, masses, or scars and thyroid normal to palpation  RESP: lungs clear to auscultation - no rales, rhonchi or wheezes  CV: regular rate and rhythm, normal S1 S2, no S3 or S4, no murmur, click or rub, no peripheral edema and peripheral pulses strong  ABDOMEN: soft, nontender, no hepatosplenomegaly, no masses and bowel sounds normal  MS: no gross musculoskeletal defects noted, no edema; no swollen or tender joints  SKIN: no suspicious lesions or rashes  NEURO: Normal strength and tone, mentation intact and speech normal  PSYCH: mentation appears normal, affect normal/bright    Diagnostic Test Results:  Labs reviewed in Epic    ASSESSMENT/PLAN:       ICD-10-CM    1. Routine general medical examination at a health care facility  Z00.00    2. Multiple joint pain  M25.50 CBC " with platelets     Comprehensive metabolic panel (BMP + Alb, Alk Phos, ALT, AST, Total. Bili, TP)     ESR: Erythrocyte sedimentation rate     CRP, inflammation     Cyclic Citrullinated Peptide Antibody IgG     Rheumatoid factor     CBC with platelets     Comprehensive metabolic panel (BMP + Alb, Alk Phos, ALT, AST, Total. Bili, TP)     ESR: Erythrocyte sedimentation rate     CRP, inflammation     Cyclic Citrullinated Peptide Antibody IgG     Rheumatoid factor   3. Alternating constipation and diarrhea  R19.8 Tissue transglutaminase vikas IgA and IgG     CBC with platelets     Comprehensive metabolic panel (BMP + Alb, Alk Phos, ALT, AST, Total. Bili, TP)     Tissue transglutaminase vikas IgA and IgG     CBC with platelets     Comprehensive metabolic panel (BMP + Alb, Alk Phos, ALT, AST, Total. Bili, TP)   4. Concentration deficit  R41.840 Adult Mental Health  Referral   5. Need for hepatitis C screening test  Z11.59 Hepatitis C Screen Reflex to HCV RNA Quant and Genotype     Hepatitis C Screen Reflex to HCV RNA Quant and Genotype   6. Screening for HIV (human immunodeficiency virus)  Z11.4 HIV Antigen Antibody Combo     HIV Antigen Antibody Combo     Family history of autoimmune disease - nonspecific joint pain but w/out swelling. Some morning stiffness. Will check labs.   Discussed ADHD testing - referral placed.    ---------  PATIENT INSTRUCTIONS    Great to see you - glad the foot is better!    ADHD evaluation schedulers will call you. Once you have a date call and set up a virtual appointment with me about a month later. Make sure they have my information to send me the report.     Schedule with Dr. Olson for the pap.     See if you can find patterns with the GI issues - food, stress, time of the month, etc. Any severe pain, blood in the stool, weight loss -> call me.   ------------------------------       COUNSELING:  Reviewed preventive health counseling, as reflected in patient instructions    Estimated  "body mass index is 33.96 kg/m  as calculated from the following:    Height as of this encounter: 1.664 m (5' 5.5\").    Weight as of this encounter: 94 kg (207 lb 3.2 oz).    Weight management plan: Discussed healthy diet and exercise guidelines    She reports that she has never smoked. She has never used smokeless tobacco.      Counseling Resources:  ATP IV Guidelines  Pooled Cohorts Equation Calculator  Breast Cancer Risk Calculator  BRCA-Related Cancer Risk Assessment: FHS-7 Tool  FRAX Risk Assessment  ICSI Preventive Guidelines  Dietary Guidelines for Americans, 2010  USDA's MyPlate  ASA Prophylaxis  Lung CA Screening    Jeromy Hernandez MD  [facility] [place]  "

## 2022-08-08 NOTE — PATIENT INSTRUCTIONS
Great to see you - glad the foot is better!    ADHD evaluation schedulers will call you. Once you have a date call and set up a virtual appointment with me about a month later. Make sure they have my information to send me the report.     Schedule with Dr. Olson for the pap.     See if you can find patterns with the GI issues - food, stress, time of the month, etc. Any severe pain, blood in the stool, weight loss -> call me.     Preventive Health Recommendations  Female Ages 26 - 39  Yearly exam:   See your health care provider every year in order to  Review health changes.   Discuss preventive care.    Review your medicines if you your doctor has prescribed any.    Until age 30: Get a Pap test every three years (more often if you have had an abnormal result).    After age 30: Talk to your doctor about whether you should have a Pap test every 3 years or have a Pap test with HPV screening every 5 years.   You do not need a Pap test if your uterus was removed (hysterectomy) and you have not had cancer.  You should be tested each year for STDs (sexually transmitted diseases), if you're at risk.   Talk to your provider about how often to have your cholesterol checked.  If you are at risk for diabetes, you should have a diabetes test (fasting glucose).  Shots: Get a flu shot each year. Get a tetanus shot every 10 years.   Nutrition:   Eat at least 5 servings of fruits and vegetables each day.  Eat whole-grain bread, whole-wheat pasta and brown rice instead of white grains and rice.  Get adequate Calcium and Vitamin D.     Lifestyle  Exercise at least 150 minutes a week (30 minutes a day, 5 days of the week). This will help you control your weight and prevent disease.  Limit alcohol to one drink per day.  No smoking.   Wear sunscreen to prevent skin cancer.  See your dentist every six months for an exam and cleaning.

## 2022-08-09 LAB
ALBUMIN SERPL-MCNC: 3.9 G/DL (ref 3.4–5)
ALP SERPL-CCNC: 44 U/L (ref 40–150)
ALT SERPL W P-5'-P-CCNC: 26 U/L (ref 0–50)
ANION GAP SERPL CALCULATED.3IONS-SCNC: 8 MMOL/L (ref 3–14)
AST SERPL W P-5'-P-CCNC: 14 U/L (ref 0–45)
BILIRUB SERPL-MCNC: 0.4 MG/DL (ref 0.2–1.3)
BUN SERPL-MCNC: 11 MG/DL (ref 7–30)
CALCIUM SERPL-MCNC: 8.8 MG/DL (ref 8.5–10.1)
CCP AB SER IA-ACNC: 0.7 U/ML
CHLORIDE BLD-SCNC: 110 MMOL/L (ref 94–109)
CO2 SERPL-SCNC: 21 MMOL/L (ref 20–32)
CREAT SERPL-MCNC: 0.78 MG/DL (ref 0.52–1.04)
GFR SERPL CREATININE-BSD FRML MDRD: >90 ML/MIN/1.73M2
GLUCOSE BLD-MCNC: 80 MG/DL (ref 70–99)
HCV AB SERPL QL IA: NONREACTIVE
HIV 1+2 AB+HIV1 P24 AG SERPL QL IA: NONREACTIVE
POTASSIUM BLD-SCNC: 3.9 MMOL/L (ref 3.4–5.3)
PROT SERPL-MCNC: 7 G/DL (ref 6.8–8.8)
SODIUM SERPL-SCNC: 139 MMOL/L (ref 133–144)
TTG IGA SER-ACNC: <0.2 U/ML
TTG IGG SER-ACNC: <0.6 U/ML

## 2022-08-10 LAB — RHEUMATOID FACT SER NEPH-ACNC: <6 IU/ML

## 2022-09-03 ENCOUNTER — HEALTH MAINTENANCE LETTER (OUTPATIENT)
Age: 37
End: 2022-09-03

## 2022-09-14 ENCOUNTER — E-VISIT (OUTPATIENT)
Dept: PEDIATRICS | Facility: CLINIC | Age: 37
End: 2022-09-14
Payer: COMMERCIAL

## 2022-09-14 DIAGNOSIS — K21.9 GASTROESOPHAGEAL REFLUX DISEASE, UNSPECIFIED WHETHER ESOPHAGITIS PRESENT: Primary | ICD-10-CM

## 2022-09-14 PROCEDURE — 99422 OL DIG E/M SVC 11-20 MIN: CPT | Performed by: INTERNAL MEDICINE

## 2022-11-18 ENCOUNTER — OFFICE VISIT (OUTPATIENT)
Dept: OBGYN | Facility: CLINIC | Age: 37
End: 2022-11-18
Payer: COMMERCIAL

## 2022-11-18 VITALS
HEIGHT: 65 IN | BODY MASS INDEX: 34.49 KG/M2 | WEIGHT: 207 LBS | DIASTOLIC BLOOD PRESSURE: 82 MMHG | SYSTOLIC BLOOD PRESSURE: 118 MMHG

## 2022-11-18 DIAGNOSIS — R87.810 CERVICAL HIGH RISK HPV (HUMAN PAPILLOMAVIRUS) TEST POSITIVE: ICD-10-CM

## 2022-11-18 DIAGNOSIS — Z01.419 WOMEN'S ANNUAL ROUTINE GYNECOLOGICAL EXAMINATION: Primary | ICD-10-CM

## 2022-11-18 DIAGNOSIS — Z86.19 H/O COLD SORES: ICD-10-CM

## 2022-11-18 DIAGNOSIS — Z12.4 PAP SMEAR FOR CERVICAL CANCER SCREENING: ICD-10-CM

## 2022-11-18 PROCEDURE — 87624 HPV HI-RISK TYP POOLED RSLT: CPT | Performed by: OBSTETRICS & GYNECOLOGY

## 2022-11-18 PROCEDURE — 99395 PREV VISIT EST AGE 18-39: CPT | Performed by: OBSTETRICS & GYNECOLOGY

## 2022-11-18 PROCEDURE — G0145 SCR C/V CYTO,THINLAYER,RESCR: HCPCS | Performed by: OBSTETRICS & GYNECOLOGY

## 2022-11-18 RX ORDER — VALACYCLOVIR HYDROCHLORIDE 1 G/1
2000 TABLET, FILM COATED ORAL 2 TIMES DAILY
Qty: 8 TABLET | Refills: 3 | Status: SHIPPED | OUTPATIENT
Start: 2022-11-18 | End: 2023-07-14

## 2022-11-18 NOTE — PATIENT INSTRUCTIONS
Preventive Health Recommendations  Female Ages 26 - 39  Yearly exam:    See your health care provider every year in order to  Review health changes.    Discuss preventive care.     Review your medicines if you your doctor has prescribed any.    Until age 30: Get a Pap test every three years (more often if you have had an abnormal result).    After age 30: Talk to your doctor about whether you should have a Pap test with HPV screening every 5 years.    You do not need a Pap test if your uterus was removed (hysterectomy) and you have not had cancer or precancer of the cervix.  You should be tested each year for STDs (sexually transmitted diseases) if you're at risk.   Talk to your provider about how often to have your cholesterol checked.  If you are at risk for diabetes, you should have a diabetes test (fasting glucose). For example, if you had gestational diabetes in a prior pregnancy, you are at risk.  Shots: Get a flu shot each year. Get a tetanus shot every 10 years. Get vaccinated against COVID 19, if you have not done so.  Nutrition:    Eat at least 5 servings of fruits and vegetables each day.  Eat whole-grain bread, whole-wheat pasta and brown rice instead of white grains and rice.  Consume 1000mg of Calcium and 400 IU of Vitamin D daily. If these are not in your regular diet you should take a supplement.  To calculate the calcium in your food add a zero to the percent found on the packaging (ex: 30% = 300mg of calcium per serving)    Lifestyle  Get in at least 150 minutes of physical activity a week (30 minutes a day, 5 days of the week), of which about half should be vigorous exercise (jogging, swimming, lifting weights).  This will help you control your weight and prevent disease.  Limit alcohol to one drink per day.  No smoking.    Wear sunscreen to prevent skin cancer.  See your dentist every six months for an exam and cleaning

## 2022-11-18 NOTE — NURSING NOTE
"Chief Complaint   Patient presents with     Gyn Exam       Initial /82   Ht 1.651 m (5' 5\")   Wt 93.9 kg (207 lb)   LMP 10/15/2022 (Approximate)   BMI 34.45 kg/m   Estimated body mass index is 34.45 kg/m  as calculated from the following:    Height as of this encounter: 1.651 m (5' 5\").    Weight as of this encounter: 93.9 kg (207 lb).  BP completed using cuff size: regular    Questioned patient about current smoking habits.  Pt. has never smoked.          The following HM Due: pap smear      The following patient reported/Care Every where data was sent to:  P ABSTRACT QUALITY INITIATIVES [[id number]]  Marylin Garcia LPN               "

## 2022-11-18 NOTE — PROGRESS NOTES
SUBJECTIVE:                                                      Minerva is a 37 year old  female who presents for annual exam.     Patient's last menstrual period was 10/15/2022 (approximate).. Menses are regular q 28-30 days and normal. Using vasectomy for contraception.  She is not currently considering pregnancy.  Besides routine health maintenance, she has no other health concerns today .  GYNECOLOGIC HISTORY:    Minerva is sexually active with 1 male partner(s) and is currently in a monogamous relationship.      History sexually transmitted infections:HPV    History of abnormal Pap smear: YES - updated in Problem List and Health Maintenance accordingly  Family history of breast CA: No  Family history of uterine/ovarian CA: No    Family history of colon CA: Yes (Please explain): mgf      HISTORY:  OB History    Para Term  AB Living   2 0 0 0 0 2   SAB IAB Ectopic Multiple Live Births   0 0 0 0 0      # Outcome Date GA Lbr Milad/2nd Weight Sex Delivery Anes PTL Lv   2             1               Past Medical History:   Diagnosis Date     Cervical high risk HPV (human papillomavirus) test positive 2019     Kidney infection      UTI (urinary tract infection)      Past Surgical History:   Procedure Laterality Date     NO HISTORY OF SURGERY       Family History   Problem Relation Age of Onset     Skin Cancer Mother         Precancerous - not melanoma     Alcoholism Father      Cancer Maternal Grandmother         lymphoma     Colon Cancer Maternal Grandfather         In his mid 60s     Diabetes Maternal Grandfather      Cancer Paternal Grandmother      Alcoholism Paternal Grandfather      Lupus Maternal Aunt      Rheumatologic Disease Maternal Aunt         MCTD     Scleroderma Maternal Aunt      Breast Cancer No family hx of      Social History     Socioeconomic History     Marital status:      Spouse name: None     Number of children: None     Years of education: None      Highest education level: None   Tobacco Use     Smoking status: Never     Smokeless tobacco: Never   Vaping Use     Vaping Use: Never used   Substance and Sexual Activity     Alcohol use: Yes     Comment: on occasion     Drug use: No     Sexual activity: Yes     Partners: Male     Birth control/protection: Male Surgical     Comment:  vas   Social History Narrative    8/2022    Camping, gardening, hiking    Kids are 17 and 13    2 cats     Social Determinants of Health     Financial Resource Strain: Low Risk      Difficulty of Paying Living Expenses: Not very hard   Food Insecurity: No Food Insecurity     Worried About Running Out of Food in the Last Year: Never true     Ran Out of Food in the Last Year: Never true   Transportation Needs: No Transportation Needs     Lack of Transportation (Medical): No     Lack of Transportation (Non-Medical): No   Physical Activity: Insufficiently Active     Days of Exercise per Week: 2 days     Minutes of Exercise per Session: 30 min   Stress: No Stress Concern Present     Feeling of Stress : Not at all   Social Connections: Moderately Integrated     Frequency of Communication with Friends and Family: More than three times a week     Frequency of Social Gatherings with Friends and Family: Never     Attends Yarsanism Services: Never     Active Member of Clubs or Organizations: Yes     Marital Status:    Housing Stability: Low Risk      Unable to Pay for Housing in the Last Year: No     Number of Places Lived in the Last Year: 1     Unstable Housing in the Last Year: No       Current Outpatient Medications:      valACYclovir (VALTREX) 1000 mg tablet, Take 2 tablets (2,000 mg) by mouth 2 times daily for 1 day, Disp: 8 tablet, Rfl: 3     fluticasone (FLONASE) 50 MCG/ACT nasal spray, Spray 1 spray into both nostrils daily, Disp: , Rfl:      ibuprofen (ADVIL/MOTRIN) 200 MG capsule, Take 200 mg by mouth every 4 hours as needed for fever, Disp: , Rfl:      omeprazole  "(PRILOSEC) 20 MG DR capsule, Take 1 capsule (20 mg) by mouth daily, Disp: 90 capsule, Rfl: 0   No Known Allergies    Past medical, surgical, social and family history were reviewed and updated in EPIC.    ROS:   Negative other than as noted above.      OBJECTIVE:                                                      EXAM:  /82   Ht 1.651 m (5' 5\")   Wt 93.9 kg (207 lb)   LMP 10/15/2022 (Approximate)   BMI 34.45 kg/m     BMI: Body mass index is 34.45 kg/m .  General: Alert and oriented, no distress.  Psychiatric: Mood and affect within normal limits.  Skin: Warm and dry, no lesions, rashes or discolorations.  Breasts:  Symmetric, no skin changes.  No dominant masses bilaterally.   Lymph:  No cervical, supraclavicular, infraclavicular, axillary or inguinal lymphadenopathy palpable.   Abdomen: Soft, nontender, no hepatosplenomegaly, no rebound or guarding, no masses, no hernias.   Vulva:  No external lesions, normal female hair distribution, no inguinal adenopathy.    Urethra:  Midline, non-tender, well supported, no discharge  Vagina:  Well-estrogenized, no abnormal discharge, no lesions  Cervix: no lesions, no discharge  Uterus:  anteverted, smooth contour, without enlargement, mobile, and without tenderness  Ovaries:  No masses appreciated, non-tender, mobile  Rectal Exam: deferred  Musculoskeletal: extremities normal      COUNSELING:   Reviewed preventive health counseling, as reflected in patient instructions   reports that she has never smoked. She has never used smokeless tobacco.        ASSESSMENT/PLAN:                                                      37 year old female with satisfactory annual exam  (Z01.419) Women's annual routine gynecological examination  (primary encounter diagnosis)  Comment:   Plan: follow up yearly or as needed.    (D98.754) Cervical high risk HPV (human papillomavirus) test positive  Comment:   Plan: pap with HPV today, results via [employer].    (Z86.19) H/O cold " sores  Comment:   Plan: valACYclovir (VALTREX) 1000 mg tablet        Asked about treatment for cold sores; discussed and prescription sent.    (Z12.4) Pap smear for cervical cancer screening  Comment:   Plan: Pap imaged thin layer screen with HPV -         recommended age 30 - 65              Raysa Olson MD

## 2022-11-22 LAB
BKR LAB AP GYN ADEQUACY: NORMAL
BKR LAB AP GYN INTERPRETATION: NORMAL
BKR LAB AP HPV REFLEX: NORMAL
BKR LAB AP LMP: NORMAL
BKR LAB AP PREVIOUS ABNORMAL: NORMAL
PATH REPORT.COMMENTS IMP SPEC: NORMAL
PATH REPORT.COMMENTS IMP SPEC: NORMAL
PATH REPORT.RELEVANT HX SPEC: NORMAL

## 2022-11-25 LAB
HUMAN PAPILLOMA VIRUS 16 DNA: NEGATIVE
HUMAN PAPILLOMA VIRUS 18 DNA: NEGATIVE
HUMAN PAPILLOMA VIRUS FINAL DIAGNOSIS: NORMAL
HUMAN PAPILLOMA VIRUS OTHER HR: NEGATIVE

## 2022-12-12 DIAGNOSIS — K21.9 GASTROESOPHAGEAL REFLUX DISEASE, UNSPECIFIED WHETHER ESOPHAGITIS PRESENT: ICD-10-CM

## 2022-12-14 ENCOUNTER — MYC MEDICAL ADVICE (OUTPATIENT)
Dept: PEDIATRICS | Facility: CLINIC | Age: 37
End: 2022-12-14

## 2022-12-14 NOTE — TELEPHONE ENCOUNTER
My Chart message sent to patient to check on status of symptoms and if she has tried the Pepcid yet?  Per E visit in September-  take the omeprazole 20 daily for 2-3 months  - then we can try to use pepcid (works faster,not as strong, fewer long term side effects, cheaper).   - Start pepcid twice daily as you wean off the omeprazole (take the omeprazole every other day for a few days, then every 3rd day for a few days and then stop). You'll be taking both the prilosec and the omeprazole for a few days and that's okay.  - if your symptoms return on the pepcid okay to stop it and restart the omeprazole.   BETSY Quinones RN

## 2022-12-15 NOTE — TELEPHONE ENCOUNTER
Updated patient via [employer] message. Nataly Chaney RN on 12/15/2022 at 10:52 AM

## 2022-12-15 NOTE — TELEPHONE ENCOUNTER
Agree w/ continuing the ppi a bit longer and then trying the pepcid. Can watch to see if diet contributes at all to the days its not helpful.    SANTOS Hernandez MD  Internal Medicine-Pediatrics

## 2023-02-18 NOTE — TELEPHONE ENCOUNTER
Patient responded thru My Chart that -The omreprazole has mostly worked.   A few days more recently it hasn't been fully effective. I have not tried the pepcid yet.    Would you like her to continue on the Omeprazole for a while longer since she reports it hasn't been effective in the past few days, or should she try the taper, noted below?  Thank you.  BETSY Quinones RN       none

## 2023-02-21 ENCOUNTER — VIRTUAL VISIT (OUTPATIENT)
Dept: PSYCHOLOGY | Facility: CLINIC | Age: 38
End: 2023-02-21
Attending: INTERNAL MEDICINE
Payer: COMMERCIAL

## 2023-02-21 DIAGNOSIS — F88 DEVELOPMENTAL MENTAL DISORDER: Primary | ICD-10-CM

## 2023-02-21 PROCEDURE — 90832 PSYTX W PT 30 MINUTES: CPT | Mod: VID | Performed by: PSYCHOLOGIST

## 2023-02-21 ASSESSMENT — ANXIETY QUESTIONNAIRES
6. BECOMING EASILY ANNOYED OR IRRITABLE: SEVERAL DAYS
7. FEELING AFRAID AS IF SOMETHING AWFUL MIGHT HAPPEN: SEVERAL DAYS
3. WORRYING TOO MUCH ABOUT DIFFERENT THINGS: SEVERAL DAYS
4. TROUBLE RELAXING: MORE THAN HALF THE DAYS
8. IF YOU CHECKED OFF ANY PROBLEMS, HOW DIFFICULT HAVE THESE MADE IT FOR YOU TO DO YOUR WORK, TAKE CARE OF THINGS AT HOME, OR GET ALONG WITH OTHER PEOPLE?: SOMEWHAT DIFFICULT
GAD7 TOTAL SCORE: 8
GAD7 TOTAL SCORE: 8
IF YOU CHECKED OFF ANY PROBLEMS ON THIS QUESTIONNAIRE, HOW DIFFICULT HAVE THESE PROBLEMS MADE IT FOR YOU TO DO YOUR WORK, TAKE CARE OF THINGS AT HOME, OR GET ALONG WITH OTHER PEOPLE: SOMEWHAT DIFFICULT
2. NOT BEING ABLE TO STOP OR CONTROL WORRYING: SEVERAL DAYS
6. BECOMING EASILY ANNOYED OR IRRITABLE: SEVERAL DAYS
3. WORRYING TOO MUCH ABOUT DIFFERENT THINGS: SEVERAL DAYS
5. BEING SO RESTLESS THAT IT IS HARD TO SIT STILL: SEVERAL DAYS
GAD7 TOTAL SCORE: 8
7. FEELING AFRAID AS IF SOMETHING AWFUL MIGHT HAPPEN: SEVERAL DAYS
1. FEELING NERVOUS, ANXIOUS, OR ON EDGE: SEVERAL DAYS
IF YOU CHECKED OFF ANY PROBLEMS ON THIS QUESTIONNAIRE, HOW DIFFICULT HAVE THESE PROBLEMS MADE IT FOR YOU TO DO YOUR WORK, TAKE CARE OF THINGS AT HOME, OR GET ALONG WITH OTHER PEOPLE: SOMEWHAT DIFFICULT
8. IF YOU CHECKED OFF ANY PROBLEMS, HOW DIFFICULT HAVE THESE MADE IT FOR YOU TO DO YOUR WORK, TAKE CARE OF THINGS AT HOME, OR GET ALONG WITH OTHER PEOPLE?: SOMEWHAT DIFFICULT
GAD7 TOTAL SCORE: 8
4. TROUBLE RELAXING: MORE THAN HALF THE DAYS
7. FEELING AFRAID AS IF SOMETHING AWFUL MIGHT HAPPEN: SEVERAL DAYS
GAD7 TOTAL SCORE: 8
5. BEING SO RESTLESS THAT IT IS HARD TO SIT STILL: SEVERAL DAYS
GAD7 TOTAL SCORE: 8
1. FEELING NERVOUS, ANXIOUS, OR ON EDGE: SEVERAL DAYS
2. NOT BEING ABLE TO STOP OR CONTROL WORRYING: SEVERAL DAYS
7. FEELING AFRAID AS IF SOMETHING AWFUL MIGHT HAPPEN: SEVERAL DAYS

## 2023-02-21 ASSESSMENT — PATIENT HEALTH QUESTIONNAIRE - PHQ9: SUM OF ALL RESPONSES TO PHQ QUESTIONS 1-9: 7

## 2023-02-21 ASSESSMENT — COLUMBIA-SUICIDE SEVERITY RATING SCALE - C-SSRS
TOTAL  NUMBER OF INTERRUPTED ATTEMPTS LIFETIME: NO
ATTEMPT LIFETIME: NO
TOTAL  NUMBER OF ABORTED OR SELF INTERRUPTED ATTEMPTS LIFETIME: NO
2. HAVE YOU ACTUALLY HAD ANY THOUGHTS OF KILLING YOURSELF?: NO
6. HAVE YOU EVER DONE ANYTHING, STARTED TO DO ANYTHING, OR PREPARED TO DO ANYTHING TO END YOUR LIFE?: NO
1. HAVE YOU WISHED YOU WERE DEAD OR WISHED YOU COULD GO TO SLEEP AND NOT WAKE UP?: NO

## 2023-02-21 NOTE — PROGRESS NOTES
[facility]   Mental Health & Addiction Services     Progress Note - Initial Visit    Client Name:  Minerva Cardona Date: 2023         Service Type: Individual     Visit Start Time: 2:00pm  Visit End Time: 2:31pm    Visit #: 1    Attendees: Client attended alone    Service Modality:  Video Visit:      Provider verified identity through the following two step process.  Patient provided:  Patient  and Patient address    Telemedicine Visit: The patient's condition can be safely assessed and treated via synchronous audio and visual telemedicine encounter.      Reason for Telemedicine Visit: Services only offered telehealth    Originating Site (Patient Location): Patient's home    Distant Site (Provider Location): Provider Remote Setting- Home Office    Consent:  The patient/guardian has verbally consented to: the potential risks and benefits of telemedicine (video visit) versus in person care; bill my insurance or make self-payment for services provided; and responsibility for payment of non-covered services.     Patient would like the video invitation sent by:  Send to e-mail at: [email or web address]    Mode of Communication:  Video Conference via [employer]well    Distant Location (Provider):  Off-site    As the provider I attest to compliance with applicable laws and regulations related to telemedicine.       DATA:  Extended Session (53+ minutes): No  Interactive Complexity: No   Crisis: No     Presenting Concerns/Current Stressors:   Patient presented to session to initiate the ADHD evaluation process.       PHQ 2023   PHQ-9 Total Score 7   Q9: Thoughts of better off dead/self-harm past 2 weeks Not at all        SANDIE-7 SCORE 2023   Total Score - 8 (mild anxiety)   Total Score 8 8       ASSESSMENT:  Mental Status Assessment:  Appearance:   Appropriate   Eye Contact:   Good   Psychomotor Behavior: Normal   Attitude:   Cooperative    Orientation:   All  Speech   Rate / Production: Normal/ Responsive   Volume:  Normal   Mood:    Normal  Affect:    Appropriate   Thought Content:  Clear   Thought Form:  Logical   Insight:    Good       Safety Issues and Plan for Safety and Risk Management:   [place] Suicide Severity Rating Scale (Lifetime/Recent)  [place] Suicide Severity Rating (Lifetime/Recent) 2/21/2023   1. Wish to be Dead (Lifetime) 0   2. Non-Specific Active Suicidal Thoughts (Lifetime) 0   Actual Attempt (Lifetime) 0   Has subject engaged in non-suicidal self-injurious behavior? (Lifetime) 0   Interrupted Attempts (Lifetime) 0   Aborted or Self-Interrupted Attempt (Lifetime) 0   Preparatory Acts or Behavior (Lifetime) 0   Calculated C-SSRS Risk Score (Lifetime/Recent) No Risk Indicated     Patient denies current fears or concerns for personal safety.  Patient denies current or recent suicidal ideation or behaviors.  Patient denies current or recent homicidal ideation or behaviors.  Patient denies current or recent self injurious behavior or ideation.  Patient denies other safety concerns.  Recommended that patient call 911 or go to the local ED should there be a change in any of these risk factors.   Patient reports there are firearms in the house. The firearms are secured in a locked space.    Diagnostic Criteria:  F88:  A. A persistent pattern of inattention and/or hyperactivity-impulsivity that interferes with functioning or development, as characterized by (1) and/or (2):   1. Six or more inattention symptoms that have persisted for at least 6 months to a degree that is inconsistent with developmental level and that negatively impacts directly on social and academic/occupational activities.   2. Six or more hyperactivity and impulsivity symptoms that have persisted for at least 6 months to a degree that is inconsistent with developmental level and that negatively impacts directly on social and academic/occupational activities.  B.  Several symptoms (inattentive or hyperactive/impulsive) were present before the age of 12 years.  C. Several symptoms (inattentive or hyperactive/impulsive) present in ?2 settings (eg, at home, school, or work; with friends or relatives; in other activities).  D. There is clear evidence that the symptoms interfere with or reduce the quality of social, academic, or occupational functioning.  E. Symptoms do not occur exclusively during the course of schizophrenia or another psychotic disorder, and are not better explained by another mental disorder (eg, mood disorder, anxiety disorder, dissociative disorder, personality disorder, substance intoxication, or withdrawal).      DSM5 Diagnoses: (Sustained by DSM5 Criteria Listed Above)  Diagnoses:   1. Developmental mental disorder    Rule out anxiety   Psychosocial & Contextual Factors: social support, grandfather recently passed away and is grieving, employed     WHODAS 2.0 (12 item):   WHODAS 2.0 Total Score 2/21/2023   Total Score 31   Total Score MyChart 31     PROMIS-10 Scores  Global Mental Health Score: 12  Global Physical Health Score: 12   PROMIS TOTAL - SUBSCORES: 24      Intervention:              Reviewed symptoms and history of presenting concern. Patient endorsed symptoms consistent with ADHD. Endorsed some anxiety symptoms but has never been diagnosed with another mental health disorder in the past. Patient denied symptoms associated with depression , jeri, panic, OCD, trauma and perceptual difficulties. Unable to complete diagnostic intake, will be completed in next session.  CBT: socratic questioning, positive reinforcement  EFT: empathetic attunement, emotion checking, emotion naming  MI: open ended questions, affirmations, reflections        Attendance Agreement:  Client has not signed the attendance agreement. Discussed expectations at beginning of this first session and patient agreed.       PLAN:  Provider will continue Diagnostic Assessment in next  session. Patient will complete Cynthia questionnaires  and CNS Vital Signs prior to next session (2/28/2023).    Patient meets the following risk assessment and triage: Patient denied any current/recent/lifetime history of suicidal ideation and/or behaviors.  No safety plan indicated at this time.     Medical necessity criteria is warranted in order to: Measure a psychological disorder and its severity and functional impairment to determine psychiatric diagnosis when a mental illness is suspected, or to achieve a differential diagnosis from a range of medical/psychological disorders that present with similar constellations of symptoms (e.g., determination and measurement of anxiety severity and impact in the presence of ongoing asthma or heart disease), Perform symptom measurement to objectively measure treatment effectiveness and/or determine the need to refer for pharmacological treatment or other medical evaluation (e.g., based on severity and chronicity of symptoms) and Evaluate primary symptoms of impaired attention and concentration that can occur in many neurological and psychiatric conditions.    Medical necessity for psychological assessment is warranted as a result of the following: (1) A specific clinical question is posed that relates to the condition/symptoms being addressed (2) The question cannot be adequately addressed by clinical interview and/or behavioral observation (3) Results of psychological testing are reasonably expected to provide an answer to the query (4) It is reasonably expected that the testing will provide information leading to a clearer diagnosis and/or guide treatment planning with an expectation of improved clinical outcome.    I acknowledge that, based upon current clinical information, the patient and I have reviewed and discussed issues pertaining to the purpose of therapy/testing, potential therapeutic goals, procedures, risks and benefits, and estimated duration of  therapy/testing. Issues pertaining to fees/insurance and confidentiality were also addressed with the patient, who indicated understanding and elected to continue with appointments. I will not be providing any experimental procedures and, if we agree that a change in clinical procedure would be more beneficial, I will obtain specific consent for that procedure or refer you to another provider who has expertise in that area.       Inocencia Escamilla PsyD,   Clinical Psychologist

## 2023-02-28 ENCOUNTER — VIRTUAL VISIT (OUTPATIENT)
Dept: PSYCHOLOGY | Facility: CLINIC | Age: 38
End: 2023-02-28
Payer: COMMERCIAL

## 2023-02-28 DIAGNOSIS — F88 DEVELOPMENTAL MENTAL DISORDER: Primary | ICD-10-CM

## 2023-02-28 PROCEDURE — 90791 PSYCH DIAGNOSTIC EVALUATION: CPT | Mod: VID | Performed by: PSYCHOLOGIST

## 2023-02-28 NOTE — PROGRESS NOTES
[facility]  Provider Name:  Inocencia Escamilla     Credentials:  AVELINO Ramos    PATIENT'S NAME: Minerva Cardona  PREFERRED NAME: Minerva  PRONOUNS: She/her  MRN: [id number]  : 1985  ADDRESS: [address] [place] [id number]  ACCT. NUMBER:  [id number]  DATE OF SERVICE: 23  START TIME: 5:00pm  END TIME: 5:45pm  PREFERRED PHONE: [phone]  SERVICE MODALITY:  Video Visit:      Provider verified identity through the following two step process.  Patient provided:  Patient  and Patient address    Telemedicine Visit: The patient's condition can be safely assessed and treated via synchronous audio and visual telemedicine encounter.      Reason for Telemedicine Visit: Services only offered telehealth    Originating Site (Patient Location): Patient's home    Distant Site (Provider Location): Provider Remote Setting- Home Office    Consent:  The patient/guardian has verbally consented to: the potential risks and benefits of telemedicine (video visit) versus in person care; bill my insurance or make self-payment for services provided; and responsibility for payment of non-covered services.     Patient would like the video invitation sent by:  Send to e-mail at: [email or web address]    Mode of Communication:  Video Conference via [facility]well    Distant Location (Provider):  Off-site    As the provider I attest to compliance with applicable laws and regulations related to telemedicine.    [facility] ADULT Mental [facility] DIAGNOSTIC ASSESSMENT    Identifying Information:  Patient is a 37 year old,  woman. The pronoun use throughout this assessment reflects the patient's chosen pronoun. Patient was referred for an assessment by Jeromy Hernandez MD. Patient attended the session alone.     Chief Complaint:   Patient reported seeking services at this time for diagnostic assessment and recommendations for treatment. Patient's presenting concerns include: Problems with  "focus. Specifically, the patient reported experiencing the following symptoms: problems listening when spoken to directly, not following through with tasks, difficulty organizing, avoiding tasks that require sustained mental effort, losing things often, being easily distracted, being forgetful and is fidgety.     Patient reported that she has not been assessed for ADHD in the past. Symptoms reportedly began in childhood. The patient also reported a history of anxiety symptoms that have been present since childhood.  She indicated that social situations, \"what if's,\" and fears about not doing good enough are her primary worries. Client reported that other professional(s) are involved in providing services, as she was referred by her PCP.    Social/Family History:  Patient reported she grew up in [place], [place]. Patient was the first born of 2 children. There are no known complications during pregnancy or delivery.  The patient reported that her parents  when she was 10 years old.  She indicated that because her parents often fought prior to the divorce, this was more of a relief than anything else.  She occasionally spent some weekends with her father.  Her mother worked often.  The patient indicated being a \"latch key kid\" and spending summers at the local pool. Patient reported difficulty with childhood peer relationships, as she had difficulty making friends.  She described her self as shy and felt like the \"weird kid.\". Reported that childhood was \"complicated.\"  She stated having a close relationship with her mother, working to get closer with her brother, and does not have a relationship with her father.    The patient denied a history of learning disorders and special education programming.  The patient did report that she received extra help in  and 1ST grade for reading.  Patient denied a history of head injuries. As a child, she reported problems with doodling in class, being disorganized, " difficulty completing homework, and losing homework.  However, the patient also stated that she does not remember much of her elementary and middle school years. Recalled academic strengths in reading and English as well as weaknesses in math. The patient's highest education level is some college.  After high school, the patient completed 1 semester at [employer] before moving to [place].  There, she got pregnant with her (now)  and subsequently took some time off school.  She later returned for an [employer] design program but had difficulties completing coursework for other courses not related to her major.  As a result, the patient withdrew again.    The patient describes her cultural background as White, American.  Cultural influences and impact on patient's life structure, values, norms, and healthcare: Cartesian. Patient identified her preferred language to be English. Patient reported she does not need the assistance of an  or other support involved in therapy.     Patient is currently  to her  of almost 18 years. Patient identified their sexual orientation as bi-sexual. Patient reported having two child(ramu). Patient identified mother and spouse as part of her support system. Patient identified the quality of these relationships as stable and meaningful.      Patient is staying in own home/apartment. She lives with her  and 2 children. Housing is stable.     Patient is currently employed part-time as an .  The patient has been in this position for 1 year.  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  She previously worked at a call center and in data collection.  The patient reported making occasional mistakes and procrastinating. Patient reports finances are obtained through employment and spouse.     Patient has not served in the .     Patient reported  that she has not been involved with the legal system. Patient denies being on probation / parole / under the jurisdiction of the court.    Patient has received a 's license. Patient reported that she tends to drive fast.    Patient's Strengths and Limitations:  Patient identified the following strengths or resources that will help them succeed in treatment: family support, insight, intelligence, positive work environment, motivation, strong social skills and work ethic. Things that may interfere with the patient's success in treatment include: none identified.     Personal and Family Medical History:  Patient reported no family history of mental health issues.  Patient has not been previously diagnosed with a mental health diagnosis.   Patient has not received mental health services in the past.   Patient is not currently receiving any mental health services.  Hospitalizations: None.   Previous/Current commitments: None.     Patient has had a physical exam to rule out medical causes for current symptoms. Date of last physical exam was 11/18/2022. The patient's PCP is Jeromy Hernandez MD. Patient reported no current medical concerns. Patient denies any issues with pain.. There are not significant appetite/nutritional concerns/weight changes.    Current Outpatient Medications   Medication     fluticasone (FLONASE) 50 MCG/ACT nasal spray     ibuprofen (ADVIL/MOTRIN) 200 MG capsule     omeprazole (PRILOSEC) 20 MG DR capsule     valACYclovir (VALTREX) 1000 mg tablet     No current facility-administered medications for this visit.       N/A - Client does not have prescribed psychiatric medications.    Patient Allergies: No Known Allergies    Medical History:   Past Medical History:   Diagnosis Date     Cervical high risk HPV (human papillomavirus) test positive 06/14/2019     Kidney infection      UTI (urinary tract infection)        Family history includes: family history includes Alcoholism in her father and  "paternal grandfather; Cancer in her maternal grandmother and paternal grandmother; Colon Cancer in her maternal grandfather; Diabetes in her maternal grandfather; Lupus in her maternal aunt; Rheumatologic Disease in her maternal aunt; Scleroderma in her maternal aunt; Skin Cancer in her mother.    Current Mental Status Exam:   Appearance:  Appropriate    Eye Contact:  Good   Psychomotor:  Normal       Gait / station:  no problem  Attitude / Demeanor: Cooperative   Speech      Rate / Production: Normal/ Responsive      Volume:  Normal  volume      Language:  intact  Mood:   Normal  Affect:   Appropriate    Thought Content: Clear   Thought Process: Logical       Associations: No loosening of associations  Insight:   Good   Judgment:  Intact   Orientation:  All  Attention/concentration: Good    Rating Scales:  PHQ9:    PHQ-9 SCORE 2/21/2023   PHQ-9 Total Score 7       GAD7:    SANDIE-7 SCORE 2/21/2023 2/21/2023   Total Score - 8 (mild anxiety)   Total Score 8 8       Substance Use:  Patient did report a family history of substance use concerns; see medical history section for details. Patient has not received chemical dependency treatment in the past. Patient has not ever been to detox. Patient is not currently receiving any chemical dependency treatment. Patient reported no problems as a result of her substance use.    Alcohol: Patient reported that since the COVID-19 pandemic began, she has probably been drinking more alcohol than she should.  Reported that a couple times a week she will have beverages.  This could be 1-2 beverages or \"quite a few.\"  Nicotine: None.  Cannabis: Experimentation in the past.  Caffeine: Coffee daily, occasional tea, occasional energy drinks.  Street Drugs: None.  Prescription Drugs: None.    CAGE: C     Patient felt they ought to CUT down on your drinking (or drug use).  G     Patient felt bad or GUILTY about their drinking (or drug use).     Substance Use: No symptoms    Based on the " positive CAGE score and clinical interview there may be indications of drug/alcohol abuse.    Significant Losses/Trauma/Abuse/Neglect Issues:   There are indications or report of significant loss, trauma, abuse or neglect issues related to: are no indications and client denies any losses, trauma, abuse, or neglect concerns.  Concerns for possible neglect are not present.    Safety Assessment:   [place] Suicide Severity Rating Scale (Lifetime/Recent)  [place] Suicide Severity Rating Scale (Lifetime/Recent)  [place] Suicide Severity Rating (Lifetime/Recent) 2/21/2023   1. Wish to be Dead (Lifetime) 0   2. Non-Specific Active Suicidal Thoughts (Lifetime) 0   Actual Attempt (Lifetime) 0   Has subject engaged in non-suicidal self-injurious behavior? (Lifetime) 0   Interrupted Attempts (Lifetime) 0   Aborted or Self-Interrupted Attempt (Lifetime) 0   Preparatory Acts or Behavior (Lifetime) 0   Calculated C-SSRS Risk Score (Lifetime/Recent) No Risk Indicated     Patient denies current homicidal ideation and behaviors.  Patient denies current self-injurious ideation and behaviors.    Patient denied risk behaviors associated with substance use.  Patient denies any high risk behaviors associated with mental health symptoms.  Patient reports the following current concerns for their personal safety: None.  Patient reports there are firearms in the house.     History of Safety Concerns:  Patient denied a history of homicidal ideation.     Patient denied a history of personal safety concerns.    Patient denied a history of assaultive behaviors.    Patient denied a history of sexual assault behaviors.     Patient denied a history of risk behaviors associated with substance use.  Patient denies any history of high risk behaviors associated with mental health symptoms.  Patient reports the following protective factors: forward or future oriented thinking; dedication to family or friends; safe and stable environment; regular  sleep    Risk Plan:  See Recommendations for Safety and Risk Management Plan below.    Review of Patient-Reported Symptoms:  Depression: Change in sleep, Lack of interest, Change in energy level, Difficulties concentrating, Change in appetite and Low self-worth  Debora:  No Symptoms  Psychosis: No Symptoms  Anxiety: Excessive worry, Nervousness, Poor concentration and Irritability  Panic:  No symptoms  Post Traumatic Stress Disorder:  No Symptoms   Eating Disorder: No Symptoms  ADD / ADHD:  Difficulties listening, Poor task completion, Poor organizational skills, Distractibility, Forgetful and Restlessness/fidgety  Conduct Disorder: No symptoms  Autism Spectrum Disorder: No observed symptoms. An autism spectrum disorder diagnosis requires specialized assessment.  Obsessive Compulsive Disorder: No Symptoms  Patient reports the following compulsive behaviors and treatment history: No symptoms.      Diagnostic Criteria:   F88:  A. A persistent pattern of inattention and/or hyperactivity-impulsivity that interferes with functioning or development, as characterized by (1) and/or (2):   1. Six or more inattention symptoms that have persisted for at least 6 months to a degree that is inconsistent with developmental level and that negatively impacts directly on social and academic/occupational activities.   2. Six or more hyperactivity and impulsivity symptoms that have persisted for at least 6 months to a degree that is inconsistent with developmental level and that negatively impacts directly on social and academic/occupational activities.  B. Several symptoms (inattentive or hyperactive/impulsive) were present before the age of 12 years.  C. Several symptoms (inattentive or hyperactive/impulsive) present in ?2 settings (eg, at home, school, or work; with friends or relatives; in other activities).  D. There is clear evidence that the symptoms interfere with or reduce the quality of social, academic, or occupational  functioning.  E. Symptoms do not occur exclusively during the course of schizophrenia or another psychotic disorder, and are not better explained by another mental disorder (eg, mood disorder, anxiety disorder, dissociative disorder, personality disorder, substance intoxication, or withdrawal).    Functional Status:  Patient reports the following functional impairments: management of the household and or completion of tasks and organization.     WHODAS:   WHODAS 2.0 Total Score 2/21/2023   Total Score 31   Total Score MyChart 31     PROMIS-10:   Global Mental Health Score: (P) 11  Global Physical Health Score: (P) 12   PROMIS TOTAL - SUBSCORES: (P) 23   Nonprogrammatic care: Patient is requesting basic services to address current mental health concerns.    Clinical Summary:  1. Reason for assessment: assessing reported deficits in executive functioning (rule in/out ADHD).  2. Psychosocial, cultural and contextual factors: Social support, employed part-time, lack of mental health history.  3. Principal DSM-5 diagnoses (Sustained by DSM5 Criteria Listed Above) and other diagnoses relevant to this service:   1. Developmental mental disorder    Rule out anxiety  4. Prognosis: Expect Improvement.  5. Likely consequences of symptoms if not treated: Continued difficulties with inattention.  6. Client strengths include: employed, insightful, intelligent, motivated, responsible parent, support of family, friends and providers and supportive .     Recommendations:   Per medical necessity criteria for psychological testing, patient will complete MMPI-2 before feedback session is scheduled. Patient was made aware that the MMPI-2 needs to be completed as soon as possible.  If it is not completed within one and two weeks, email reminders will be sent directly.  The patient was also made aware that the link expires after 30 days and if the test is not completed within that timeframe, it will be her responsibility to reinitiate  contact to resume the testing process.  My contact information was provided.  Patient was in agreement to this plan.    1. Plan for Safety and Risk Management:  Safety and Risk: Recommended that patient call 911 or go to the local ED should there be a change in any of these risk factors..         Report to child / adult protection services was NA.     2. Patient's identified mental health concerns with a cultural influence will be addressed in final recommendations.     3. Initial Treatment will focus on: ADHD testing. See above.      4. Resources/Service Plan:    services are not indicated.   Modifications to assist communication are not indicated.   Additional disability accommodations are not indicated.      5. Collaboration:   Collaboration / coordination of treatment will be initiated with the following  support professionals: primary care physician.      6.  Referrals:   The following referral(s) will be initiated: N/A.    A Release of Information has been obtained for the following: N/A.   Emergency Contact was not obtained.    Clinical Substantiation/medical necessity for the above recommendations:     Medical necessity criteria is warranted in order to: Measure a psychological disorder and its severity and functional impairment to determine psychiatric diagnosis when a mental illness is suspected, or to achieve a differential diagnosis from a range of medical/psychological disorders that present with similar constellations of symptoms (e.g., determination and measurement of anxiety severity and impact in the presence of ongoing asthma or heart disease), Perform symptom measurement to objectively measure treatment effectiveness and/or determine the need to refer for pharmacological treatment or other medical evaluation (e.g., based on severity and chronicity of symptoms) and Evaluate primary symptoms of impaired attention and concentration that can occur in many neurological and psychiatric  conditions.    Medical necessity for psychological assessment is warranted as a result of the following: (1) A specific clinical question is posed that relates to the condition/symptoms being addressed (2) The question cannot be adequately addressed by clinical interview and/or behavioral observation (3) Results of psychological testing are reasonably expected to provide an answer to the query (4) It is reasonably expected that the testing will provide information leading to a clearer diagnosis and/or guide treatment planning with an expectation of improved clinical outcome.    7. ALEXANDER:    ALEXANDER: N/A.     8. Records:   These were reviewed at time of assessment.   Information in this assessment was obtained from the medical record and  provided by patient who is a good historian. Patient will have open access to their mental health medical record.    9. Interactive Complexity: No     Parts of this documentation may have been completed using dictation software. Potential errors may result and are unintentional.       Inocencia Escamilla PsyD, LP  February 28, 2023

## 2023-03-16 DIAGNOSIS — K21.9 GASTROESOPHAGEAL REFLUX DISEASE, UNSPECIFIED WHETHER ESOPHAGITIS PRESENT: ICD-10-CM

## 2023-03-23 NOTE — TELEPHONE ENCOUNTER
Patient replied but needed further clarification.   Still awaiting response in the MyChart message.

## 2023-03-23 NOTE — TELEPHONE ENCOUNTER
Patient switched to Famotidine.   See 3/20/23 [facility] message for plan.

## 2023-03-25 ENCOUNTER — TELEPHONE (OUTPATIENT)
Dept: PEDIATRICS | Facility: CLINIC | Age: 38
End: 2023-03-25
Payer: COMMERCIAL

## 2023-03-25 NOTE — TELEPHONE ENCOUNTER
Preparing for 3/27 OV. It looks like her Psychological Assessment is not complete.     Per their last note:  Per medical necessity criteria for psychological testing, patient will complete MMPI-2 before feedback session is scheduled. Patient was made aware that the MMPI-2 needs to be completed as soon as possible.  If it is not completed within one and two weeks, email reminders will be sent directly.  The patient was also made aware that the link expires after 30 days and if the test is not completed within that timeframe, it will be her responsibility to reinitiate contact to resume the testing process.    I think she has a few more days to complete the above.     Unless she has new issues she would like to discuss would recommend rescheduling my appt w/ her out 4-6 weeks so that she can complete the assessment. Please make TAYA if rescheduled.     SANTOS Hernandez MD  Internal Medicine-Pediatrics

## 2023-03-27 NOTE — TELEPHONE ENCOUNTER
Called and spoke with patient. Patient states she filled out information from link the day she received it, but has not been contacted. RN gave referral number to contact for follow up. Rescheduled for VV 4/24/23.     Henry PRO RN 3/27/2023 at 7:41 AM

## 2023-04-13 ENCOUNTER — VIRTUAL VISIT (OUTPATIENT)
Dept: PSYCHOLOGY | Facility: CLINIC | Age: 38
End: 2023-04-13
Payer: COMMERCIAL

## 2023-04-13 DIAGNOSIS — F41.9 ANXIETY: Primary | ICD-10-CM

## 2023-04-13 DIAGNOSIS — F32.A DEPRESSION, UNSPECIFIED DEPRESSION TYPE: ICD-10-CM

## 2023-04-13 PROCEDURE — 96130 PSYCL TST EVAL PHYS/QHP 1ST: CPT | Mod: VID | Performed by: PSYCHOLOGIST

## 2023-04-13 PROCEDURE — 96131 PSYCL TST EVAL PHYS/QHP EA: CPT | Mod: VID | Performed by: PSYCHOLOGIST

## 2023-04-13 NOTE — PROGRESS NOTES
[facility]   Mental Health & Addiction Services     Progress Note - ADHD Feedback Session     Patient Name: Minerva Cardona  Date: 2023       Service Type:  Individual       Session Start Time: 10:00am  Session End Time:  10:28am     Session Length: 28 minutes    Session #: 3    Attendees: Patient attended alone    Service Modality: Video Visit:      Provider verified identity through the following two step process.  Patient provided:  Patient  and Patient address    Telemedicine Visit: The patient's condition can be safely assessed and treated via synchronous audio and visual telemedicine encounter.      Reason for Telemedicine Visit: Services only offered telehealth    Originating Site (Patient Location): Patient's home    Distant Site (Provider Location): Provider Remote Setting- Home Office    Consent:  The patient/guardian has verbally consented to: the potential risks and benefits of telemedicine (video visit) versus in person care; bill my insurance or make self-payment for services provided; and responsibility for payment of non-covered services.     Patient would like the video invitation sent by:  Send to e-mail at: [email or web address]    Mode of Communication:  Video Conference via [employer]    Distant Location (Provider):  Off-site    As the provider I attest to compliance with applicable laws and regulations related to telemedicine.          2023     9:30 AM   PHQ   PHQ-9 Total Score 7   Q9: Thoughts of better off dead/self-harm past 2 weeks Not at all           2023     9:30 AM   SANDIE-7 SCORE   Total Score 8 (mild anxiety)   Total Score 8    8         DATA      Progress Since Last Session (Related to Symptoms / Goals / Homework):   Symptoms: Stable.    Homework: Completed.      Treatment Objective(s) Addressed in This Session:   Provided feedback on ADHD evaluation. Reviewed test results in depth. Plan of  care and recommendations were discussed based on testing data. See full report attached on secondary note in this encounter.     Intervention:   Provided feedback to patient regarding testing results, diagnoses, and treatment recommendations. Test results are not consistent with an ADHD diagnosis. Symptoms are better explained by depression and anxiety disorders. Personalized suggestions regarding symptoms were offered. Patient had the opportunity to ask questions; she expressed understanding.        ASSESSMENT: Current Emotional / Mental Status (status of significant symptoms):   Risk status (Self / Other harm or suicidal ideation)   Patient denies current fears or concerns for personal safety.   Patient denies current or recent suicidal ideation or behaviors.   Patient denies current or recent homicidal ideation or behaviors.   Patient denies current or recent self injurious behavior or ideation.   Patient denies other safety concerns.   Patient reports there has been no change in risk factors since their last session.     Patient reports there has been no change in protective factors since their last session.     Recommended that patient call 911 or go to the local ED should there be a change in any of these risk factors.     Appearance:   Appropriate    Eye Contact:   Good    Psychomotor Behavior: Normal    Attitude:   Cooperative    Orientation:   All   Speech    Rate / Production: Normal     Volume:  Normal    Mood:    Normal   Affect:    Appropriate    Thought Content:  Clear    Thought Form:  Coherent  Logical    Insight:    Good      Medication Review:   No current psychiatric medications prescribed     Medication Compliance:   NA     Changes in Health Issues:   None reported     Chemical Use Review:   Substance Use: See report.      Nicotine Use: No current tobacco use.      Diagnosis:  1. Anxiety    2. Depression, unspecified depression type        PLAN:   Recommendations are outlined in full evaluation  report (attached to this encounter).   Patient indicated understanding and will contact the clinic if there are further questions.    Report routed to referring provider.    Parts of this documentation may have been completed using dictation software. Potential errors may result and are unintentional.       Inocencia Escamilla PsyD, LP  Clinical Psychologist         Psychological Testing Services Summary       Testing Evaluation Services Base: 53452  (first 60 mins) Add-on: 09687  (each addtl 60 mins)   Record Review and Clarify Referral Question   1:50pm-2:00pm on 2/21/2023 10 minutes   Clinical Decision Making/Battery Modification   4:45pm-5:00pm on 2/28/2023 15 minutes   Integration/Report Generation   6:00pm-7:00pm on 4/12/2023 ([facility])  2:00pm-2:30pm on 4/12/2023 ([facility])  7:00pm-7:45pm on 4/12/2023 ([facility]-2)  7:45pm-9:00pm on 4/12/2023 (Final Report)   60 minutes  30 minutes  45 minutes  75 minutes   Interactive Feedback Session   10:00am-10:28am on 4/13/2023 28 minutes   Post-Service Work   10:28am-10:43am on 4/13/2023 15 minutes   Total Time: 278 minutes   Total Units: 1 4       Diagnoses:   F41.9 Unspecified Anxiety Disorder  F32.9 Unspecified Depressive Disorder

## 2023-04-13 NOTE — Clinical Note
Hello,  I wanted to let you know that I have completed the ADHD assessment with this patient.  As you will see the report, data do not support an ADHD diagnosis.  I encouraged her to make an appointment with you soon to discuss medication options for depression and anxiety.  There seems to be a lack of motivation that is a better explanation for some of the patient's inattention symptoms.  Symptoms came back very significant on a personality test and the patient seems to lack insight about how the symptoms are impacting her.  Please let me know if you have any questions or concerns!  Thanks!   Inocencia

## 2023-04-13 NOTE — PROGRESS NOTES
Psychological Assessment Report    Patient: Minerva Cardona  YOB: 1985  MRN:  [id number]  Date(s) of assessment: 2/21/2023 and 2/28/2023  Referral Source: Jeromy Hernandez MD - [facility]   Reason for Referral: assessing reported deficits in executive functioning     IDENTIFYING INFORMATION AND BRIEF HISTORY OF PRESENTING PROBLEM: Minerva Cardona is a 37-year-old White woman who presented to the initial diagnostic intake appointments on 2/21/2023 and 2/28/2023 (see diagnostic intake dated 2/28/2023 for more detailed background information) due to primary concerns with focus. The patient stated that focus has always been a problem and she assumed it was just her.  After learning more about ADHD, the patient thought symptoms felt relatable.    As a child, the patient reported doodling in class, having a messy desk/backpack, and having problems figuring out homework.  The patient further stated that she believes she lost homework because this was apparently discussed between her mother and teachers.  The patient went on to explain that she does not remember much information from middle school but does not know that she was not really completing homework and not turning it in.  Apparently almost did not graduate from high school because she was not completing projects effectively.  After high school, the patient enrolled in college for 1 semester before moving to [place] and getting pregnant with her first child.  After giving birth, she attempted online classes again for interior design.  She did well with the arts/creative courses but did not complete other tasks, such as discussion posts.  She completed 1 full semester, enrolled for second semester, but then withdrew shortly after.  The patient is currently employed as an  and has been in her position for about 1 year.  Has previously worked in a call center and in data collection.  Patient denied  "problems at work.  Currently, the patient reported experiencing the following symptoms: Difficulty listening when spoken to directly (zones out during conversations), does not follow through with tasks (things not completed immediately and her  apparently needs to keep her on task), difficulty organizing (has clutter piles, is able to make a daily list but does not follow it), avoids/dislikes tasks that require sustained mental effort procrastinates often), loses things often (phone daily), is easily distracted, is forgetful, and is fidgety.  The patient is seeking diagnostic clarification and updated treatment recommendations.    Mental Health History: The patient reported she has never been diagnosed with another mental health disorder in the past.  However, she did report some symptoms consistent with depression and anxiety disorder.  Worries centered around social situations, \"what ifs,\" and fears of doing good enough.  The patient denied a history of manic symptoms, social anxiety, phobic responses, symptoms of obsessive-compulsive disorder, trauma, and perceptual difficulties. The patient denied issues with sexual compulsivity, gambling, and disordered eating.    Developmental History: The patient reported that she was born about 4 weeks early and there were no complications during her mother's pregnancy or birth. The patient reported that she believes she met all of her developmental milestones on time. She denied a history of head injuries, learning disorders, and special educational programming.  However, patient did receive additional tutoring for reading in  and 1st grade. the patient recalled academic strengths in reading and English as well as weaknesses in math.  Patient reported that she grew up with her mother, father, and younger brother.  Her parents  when she was about 10 years old.  Occasionally spent weekends with her father.  The patient stated that her mother dated " "occasionally but did not have serious partners and her father also had a variety of girlfriends.  Described childhood as \"complicated, kind of hard,\" and being a latch key kid.  She recalled playing at the pool in the summers and having difficulties making friends.    Chemical Dependence/Substance Abuse History: The patient reported alcohol use to be more than what it \"should\" be.  Is consuming alcohol a couple times per week and indicated that she may have several beverages or only 1-2 beverages.  Frequency at this rate has been occurring since the COVID-19 pandemic began.     SOURCES OF DATA/ASSESSMENT: Review of medical and psychiatric records, consideration of behavioral observations during the testing (if applicable), and the results of the psychological tests are all considered in the preparation of this psychological test report. It is important to note that test results comprise a hypothesis of the patient s mental health concerns and are not an independent or conclusive assessment. Test results are combined with the patient s available medical, psychological, behavioral data for an integrated interpretation and report. Due to virtual/remote administration, certain aspects of the assessment process were impacted, such as access to direct patient observation, and maintaining an environment conducive to testing. As such, external factors have the potential to affect the validity of data collected.    TESTS ADMINISTERED:    CNS Vital Signs Neurocognitive Battery    Cynthia Adult ADHD Rating Scale-IV: Self and Other Reports (BAARS-IV)    Cynthia Functional Impairment Scale: Self and Other Reports (BFIS)    Cynthia Deficits in Executive Functioning Scale (BDEFS)    Generalized Anxiety Disorder Questionnaire (SANDIE-7)    Patient Health Questionnaire- 9 (PHQ-9)    [place] Multiphasic Personality Inventory - 2 (MMPI - 2)     BEHAVIORAL OBSERVATIONS: The patient was pleasant and cooperative throughout all interview " and explanation of testing process. The patient was oriented to person, place, and time. Mood was neutral. Eye contact was adequate and speech was at normal rate and rhythm. Motor activity was appropriate. Due to virtual/remote administration, direct patient observation was not possible during the testing process, and it is unknown if the patient was able to maintain an environment conducive to testing. As such, external factors have the potential to affect the validity of data collected.     TEST RESULTS: Test results comprise a hypothesis of the patient s mental health concerns and are not an independent or conclusive assessment. Test results are combined with the patient s available medical, psychological, behavioral, and observational data for an integrated interpretation and report.    CNS Vital Signs Neurocognitive Battery  The CNS Vital Signs Neurocognitive Battery is a remotely-administered assessment comprised of seven core subtests to individually measure the patient's verbal memory, visual memory, motor speed, psychomotor speed, reaction time, focus, ability to sustain attention and ability to adapt to changing rules and tasks.      Above average domain scores indicate a standard score (SS) greater than 109 or a Percentile Rank (AZ) greater than 74, indicating a high functioning test subject. Average is a SS  or AZ 25-74, indicating normal function. Low Average is a SS 80-89 or AZ 9-24 indicating a slight deficit or impairment. Below Average is a SS 70-79 or AZ 2-8, indicating a moderate level of deficit or impairment. Very Low is a SS less than 70 or a AZ less than 2, indicating a deficit and impairment.  Validity Indicator denotes a guideline for representing the possibility of an invalid test or domain score, and can be influenced by patient understanding, effort, or other conditions.    The patient s results are detailed below:    Domain Standard Score Percentile Description Validity    Neurocognitive Index 98 45 Average Yes   Composite Memory Measure 107 68 Average Yes   Verbal Memory 96 40 Average Yes   Visual Memory 116 86 Above Average Yes   Psychomotor Speed 100 50 Average Yes   Reaction Time 95 37 Average Yes   Complex Attention 98 45 Average Yes   Cognitive Flexibility 90 25 Average Yes   Processing Speed 100 50 Average Yes   Executive Function 91 27 Average Yes   Reasoning 117 87 Above Average Yes   Working Memory 121 92 Above Average Yes   Sustained Attention  121 92 Above Average Yes   Simple Attention 107 68 Average Yes   Motor Speed 100 50 Average Yes     Neurocognitive Index (NCI): Measures an average score derived from the domain scores or a general assessment of the overall neurocognitive status of the patient. The patient's NCI score is 98, with a percentile of 45, and falls within the average range.    Composite Memory: Measures how well subject can recognize, remember, and retrieve words and geometric figures, and is comprised of the Visual and Verbal Memory domains. The patient's Composite Memory score is 107, with a percentile of 68, and falls within the average range.    Verbal Memory: Measures how well subject can recognize, remember, and retrieve words. The patient's Verbal Memory score is 96, with a percentile of 40, and falls within the average range.    Visual Memory: Measures how well subject can recognize, remember and retrieve geometric figures. The patient's Visual Memory score is 116, with a percentile of 86, and falls within the above average range.    Psychomotor Speed: Measures how well a subject perceives, attends, responds to complex visual-perceptual information and performs simple fine motor coordination, and is comprised of the Motor Speed and Processing Speed indexes. The patient's Psychomotor Speed score is 100, with a percentile of 50, and falls within the average range.    Reaction Time: Measures how quickly the subject can react, in milliseconds, to a  simple and increasingly complex direction set. The patient's Reaction Time score is 95, with a percentile of 37, and falls within the average range.    Complex Attention: Measures the ability to track and respond to a variety of stimuli over lengthy periods of time and/or perform complex mental tasks requiring vigilance quickly and accurately. The patient's Complex Attention score is 98, with a percentile of 45, and falls within the average range.    Cognitive Flexibility: Measures how well subject is able to adapt to rapidly changing and increasingly complex set of directions and/or to manipulate the information. The patient's Cognitive Flexibility score is 90, with a percentile of 25, and falls within the average range.    Processing Speed: Measures how well a subject recognizes and processes information i.e., perceiving, attending/responding to incoming information, motor speed, fine motor coordination, and visual-perceptual ability. The patient's Processing Speed score is 100, with a percentile of 50, and falls within the average range.    Executive Function: Measures how well a subject recognizes rules, categories, and manages or navigates rapid decision making. The patient's Executive Function score is 91, with a percentile of 27, and falls within the average range.    Reasoning: Measures how well a subject can perceive and understand the meaning of visual or abstract information and recognizing relationships between visual-abstract concepts. The patient's Reasoning score is 117, with a percentile of 87, and falls in the above average range.     Working Memory: Measures how well a subject can perceive and attend to symbols using short-term memory processes. Also measures the ability to carry out short-term memory tasks that support decision making, problem solving, planning, and execution. The patient's Working Memory score is 121, with a percentile of 92, and falls in the above average range.    Sustained  "Attention: Measures how well a subject can direct and focus cognitive activity on specific stimuli. Also measurs how well a subject can focus and complete task or activity, sequence action, and focus during complex thought. The patient's Sustained Attention score is 121, with a percentile of 92, and falls in the above average range.    Simple Attention: Measures the ability to track and respond to a single defined stimulus over lengthy periods of time while performing vigilance and response inhibition quickly and accurately to a simple task. The patient's Simple Attention score is 107, with a percentile of 68, and falls within the average range.    Motor Speed: Measure: Ability to perform simple movements to produce and satisfy an intention towards a manual action and goal. The patient's Motor Speed score is 100, with a percentile of 50, and falls within the average range.    Cynthia Adult ADHD Rating Scale-IV: Self and Other Reports (BAARS-IV)  The BAARS-IV assesses for symptoms of ADHD that are experienced in one's daily life. This assessment measure includes self and collateral rating scales designed to provide information regarding current and childhood symptoms of ADHD including inattention, hyperactivity, and impulsivity. Self-report scores are reported as percentiles. Scores at the 76th-83rd percentile are considered marginal, scores at the 84th-92nd percentile are considered borderline, scores at the 93rd-95th percentile are considered mild, scores at the 96th-98th percentile are considered moderate, and those at the 99th percentile are considered severe. Collateral or \"other\" rating scales are reported as number of symptoms observed in comparison to those reported by the client. Norms and percentile scores are not available for collateral reports.     Current Symptoms Scale--Self Report:   Client completed the self-report inventory of current symptoms. The results indicate that the client's Total ADHD Score " was 45 which places the patient in the 96th percentile for overall ADHD symptoms. In addition, the client endorsed 7/9 (98th percentile) Inattention symptoms, 5/9 (97th percentile) Hyperactivity-Impulsivity symptoms, and 8/9 (98th percentile) Sluggish Cognitive Tempo symptoms. Client indicated that the reported symptoms have resulted in impaired functioning in school, home, work, and social relationships. Overall, the results suggest the client is experiencing moderate ADHD symptoms.     Current Symptoms Scale--Other Report:  Client's  completed the collateral report inventory of current symptoms. Based on the collateral contact's observation of symptoms, the client demonstrates 6/9 Inattention symptoms, 0/5 Hyperactivity symptoms, 1/4 Impulsivity symptoms, and 4/9 Sluggish Cognitive Tempo symptoms. The client's Total ADHD Score was 41. The collateral contact indicated the client demonstrates impaired functioning in school, home, work, and social relationships.  The collateral- and self-report scores are not significantly different.    Childhood Symptoms Scale--Self-Report:  Client completed the self-report inventory of childhood symptoms. The results indicate that the client's Total ADHD Score was 53 which places the patient in the 97th percentile for overall ADHD symptoms in childhood. In addition, the client endorsed 9/9 (99+ percentile) Inattention symptoms and 2/9 (83rd percentile) Hyperactivity-Impulsivity symptoms. Client indicated that the reported symptoms resulted in impaired functioning in school, home, and social relationships. Overall, the results suggest the client experienced mild symptoms of ADHD as a child.     Childhood Symptoms Scale--Other Report:  Client's mother completed the collateral report inventory of childhood symptoms. Based on the collateral contact's recollection of client's childhood symptoms, the client demonstrated 7/9 Inattention symptoms and 4/9 Hyperactivity-Impulsivity  "symptoms. The client's Total ADHD Score was 50. The collateral contact indicated the client demonstrates impaired functioning in school, home, and social relationships. The collateral- and self-report scores are not significantly different.                           Cynthia Functional Impairment Scale: Self and Other Reports (BFIS)  The BFIS is used to assess an individuals' psychosocial impairment in major life/daily activities that may be due to a mental health disorder. This assessment measure includes self and collateral rating scales. Self-report scores are reported as percentiles. Scores at the 76th-83rd percentile are considered marginal, scores at the 84th-92nd percentile are considered borderline, scores at the 93rd-95th percentile are considered mild, scores at the 96th-98th percentile are considered moderate, and those at the 99th percentile are considered severe. Collateral or \"other\" rating scales are reported as number of symptoms observed in comparison to those reported by the client. Norms and percentile scores are not available for collateral reports.     Results indicate the client identified impairment (scores at or greater than 93rd percentile) in the following areas: home-family, home-chores, money management, daily responsibilities, self-care routines, and health maintenance.  The client's Mean Impairment Score was 5.3 (92nd percentile) indicating the client is reporting 46% impairment in functioning across domains. Client's  completed the collateral rating scale, which indicated similar results.     Cynthia Deficits in Executive Functioning Scale (BDEFS)  The BDEFS is a measure used for evaluating dimensions of adult executive functioning in daily life. This assessment measure includes self and collateral rating scales. Self-report scores are reported as percentiles. Scores at the 76th-83rd percentile are considered marginal, scores at the 84th-92nd percentile are considered borderline, " "scores at the 93rd-95th percentile are considered mild, scores at the 96th-98th percentile are considered moderate, and those at the 99th percentile are considered severe. Collateral or \"other\" rating scales are reported as number of symptoms observed in comparison to those reported by the client. Norms and percentile scores are not available for collateral reports.     Results indicate the client's Total Executive Functioning Score was 252 (99+ percentile). The ADHD-Executive Functioning Index score was 35 (99+ percentile). These scores suggest the client has severe deficits in executive functioning. Results indicate the client identified significant deficits in the following areas: self-management to time (moderate deficits), self-organization/problem-solving (moderate deficits), self-restraint (severe deficits), self-motivation (severe deficits) and self-regulation of emotions (mild deficits). Client's  completed the collateral rating scale, which indicated similar results.    Generalized Anxiety Disorder Questionnaire (SANDIE-7)  This questionnaire is designed to assess for anxiety in adults. Based on the score, the patient is experiencing mild symptoms of anxiety. Client identified the following symptoms of anxiety: feeling on edge/nervous/anxious, difficulty controlling worry, worrying about many different things, trouble relaxing, and becoming easily annoyed or irritable.    Patient Health Questionnaire- 9 (PHQ-9)   This questionnaire is designed to assess for depression in adults. Based on the score, the patient is experiencing mild symptoms of depression. Client identified the following symptoms of depression: depressed mood, lack of interest, difficulty with sleep, feeling tired or having little energy, poor appetite or overeating, feeling bad about self, and poor concentration.    [place] Multiphasic Personality Inventory - 2 (MMPI-2)    The MMPI-2 was administered to evaluate current level of " emotional distress. Validity profile indicates that the patient appears to have answered in a generally straightforward and consistent manner, and obtained results are considered to be reliable and valid in representing current psychological status. No items were omitted.      Emotional Well-being: At this time, the patient is likely experiencing some depression and anxiety symptoms.  For her, these likely manifest as feeling blue, fatigue, low energy, and general nervousness.  It is likely that the patient is experiencing significant health problems that are further negatively impacting her emotional state.  Overall, the patient probably has a sense of unhappiness and may feel as though she has lived an unrewarding life thus far.    Cognitions: The patient is likely experiencing concentration, memory, and attention problems.  She may be feeling mentally slowed down.  She probably has self-critical thought processes.    Behaviors: The patient may be impulsive and easily bored.  She may have a tendency to rebel against authority.    Interpersonal Style: In her relationships, the patient is probably sociable and friendly.  However internally, she may feel insecure, shy, and introverted.  In dynamics with others, she is usually passive and submissive.  The patient may be feeling misunderstood and isolated.    SUMMARY: Minerva Cardona is a 37-year-old White woman who completed psychological testing remotely/virtually due to the COVID-19 pandemic. Testing was requested to provide updated diagnostic clarification and necessary treatment recommendations.    Patient first completed a diagnostic interview in which mental health symptoms, ADHD symptoms, and background information was gathered. Patient self-reported seven symptoms of inattention, one symptom of hyperactivity, and indicated that her abilities to function effectively at home are significantly impaired. Further, her self-reported symptoms on Cynthia measures  of ADHD symptoms were consistent with this information. Both the patient and her mother recalled significant symptoms in childhood.  The patient reported that when her mother was completing her portion of the questionnaire, she stated that she did not remember much from that time.  And believes that the patient's hyperactivity was present when she was very young, prior to the age of 8, but that she grew out of it after that point.  An objective measure of personality indicated some depression and anxiety symptoms.  Major health concerns also present.  It is likely that her difficulties with attention, concentration, and memory are increasing and/or exacerbated by these mental health symptoms.     An objective measure of neurocognitive functioning was also administered.  Results first indicated the patient's verbal memory to be scored in the average range.  Indeed, the patient was able to recognize target words from a word list immediately and after a delay in the average range.  Visual memory also scored to be in the average range, as the patient was once again able to recognize target shapes immediately and after a delay in the average range.  Motor functioning appears to be intact, as motor speed and psychomotor speed were both scored to be in the average range.  Reasoning scored to be in the above average range, indicating the patient was able to solve nonverbal puzzle matrices more effectively than peers.  Processing speed scored to be in the average range, as the patient was able to scan and respond to visual stimuli comparable to peers.  On the Stroop test, the patient responded to stimuli in a timely manner and was able to inhibit the salient, but incorrect, response in the low average range.  On a test of shifting attention, the patient was able to adjust her responses according to changing rule sets in the average range.  As such, complex attention, cognitive flexibility, and executive functioning were all  scored to be in the average range.  On a test of continuous performance, the patient was able to patrice her attention and resist making impulsive mistakes in the average range.  On a more complex continuous performance test that included one-back and two-back components, the patient was able to sustain her attention across time and hold information in mind for short-term manipulation in the above average range.  ADHD is associated with significant deficits in attention, processing speed, executive functioning, and working memory capabilities.  Inconsistent with this diagnosis, the patient scored in the average and above average ranges in all of these areas.  As such, there does not seem to be an organic basis for the reported inattention symptoms.  It is possible that subclinical depression and anxiety symptoms are a better explanation for problems.  For example, the patient did cite lack of motivation as negatively impacting her.  See recommendations below.    Referral Question Response: DSM-5 criteria for ADHD:   A. Symptom Count - Are there sufficient symptoms for the diagnosis?  Yes, patient did not endorse sufficient inattention symptoms.   B. Onset - Were several symptoms present before 12 years of age?  Yes, symptoms reportedly began in elementary school.  C. Pervasiveness - Are several symptoms present in at least two settings?  No, patient reported that symptoms seem to only be problematic at home.  D. Impairment - Do symptoms interfere with or reduce the quality of functioning? Yes, patient is unable to complete daily tasks effectively.   E. Exclusions - Are symptoms better explained by another disorder or factor? Yes, symptoms are better explained by anxiety and depression symptoms. Difficulties are not explained by an organic basis of inattention.     The patient meets the following DSM-5 criteria for unspecified anxiety disorder:  A. Excessive anxiety and worry, occurring more days than not for at least 6  months about a number of events or activities.   B. The individual finds it difficult to control the worry.  C. The anxiety and worry are associated with 3 or more of 6 symptoms.  D. The anxiety, worry, or physical symptoms cause clinically significant distress or impairment in social, occupational, or other important areas of functioning.  E. The disturbance is not attributable to the physiological effects of a substance (e.g., a drug of abuse, a medication) or another medical condition (e.g., hyperthyroidism).  F. The disturbance is not better explained by another mental disorder.    The patient also meets the following DSM-5 criteria for unspecified depressive disorder:  A. Five (or more) symptoms have been present during the same 2-week period and represent a change from previous functioning; at least one of the symptoms is either (1) depressed mood or (2) loss of interest or pleasure.   1. Depressed mood.   2. Diminished interest or pleasure in all, or almost all, activities.   3. Significant appetite change.  4. Significant sleep change.   5. Fatigue or loss of energy.   6. Feelings of worthlessness or inappropriate guilt.   7. Diminished ability to think or concentrate, or indecisiveness.   B. The symptoms cause clinically significant distress or impairment in social, occupational, or other important areas of functioning.  C. The episode is not attributable to the physiological effects of a substance or to another medical condition.  D. The occurrence of major depressive episode is not better explained by other thought / psychotic disorders.  E. There has never been a manic episode or hypomanic episode.     DIAGNOSES:  F41.9 Unspecified Anxiety Disorder  F32.9 Unspecified Depressive Disorder    PLAN OF CARE:  1. Discuss the following with your primary care provider:  a. Consider a trial of a psychotropic medication. This may help alleviate some of the patient's depression and anxiety symptoms.     2. Consider  initiating individual psychotherapy to help alleviate mood symptoms. Research indicates that outcomes are best with both medication and therapy. You can call the  [facility] line at [phone].     RECOMMENDATIONS:  1. Due to the patient's reported attention, concentration, and mood difficulties, the following health/lifestyle changes when combined, can significantly improve symptoms:   a. Avoid simple carbohydrates at breakfast. Aim for only complex carbohydrates and lean protein for your morning meal.   b. Engage in aerobic exercise 3 times per week for 30 minutes, ensuring that your heart rate stays within your training zone. Further, reading the book,  Spark,  by Bar Garcia M.D can help the patient understand the benefits of exercise on the brain.   c. Research suggest that taking a high-quality multi-vitamin and antioxidant (1/2 cup of blueberries) daily in conjunction with balanced nutrition can be helpful.  d. Aim for the high end of daily water intake: around 72 ounces per day.  e. Ensure regular meals and snacks to maintain optimal attention.    2. The following may be beneficial in managing some of the patient's attention and concentration difficulties:  a. Due to the patient's difficulties with attention and concentration, consider working in a completely distraction-free area while completing tasks. Workspaces should be completely clear except for the materials needed for the current task. Both visual and auditory distractions should be decreased as much as possible.  b. Considering decreased ability to focus and maintain attention, it is recommended that the patient take frequent breaks while completing tasks. This will help to maintain attention and effort. The patient may benefit from the use of a [employer] Timer. The timer works by using built-in break times. After working on a task consistently for 25 minutes, the timer reminds the user to take a five-minute break before  "continuing, etc. A Peer[employer] timer can be downloaded as a free olive to a phone or tablet.  c. Due to the patient's attentional and concentration symptoms, it is recommended to increase organization with the use of lists and calendars. Significantly increasing structure to the day and adhering to a set schedule can increase your ability to complete responsibilities, track deadline, etc. Breaking these tasks down into their component parts and recording them in a calendar/planner will likely be beneficial. Patient would benefit from setting feasible timelines for completion of activities. By establishing clear priorities for completing tasks, you can more likely complete the most important tasks first. The patient may also choose to elect to a friend or family member to help hold them accountable.    3. Avoid multitasking. Attempting to work on multiple tasks and projects the same increases the likelihood that an error will occur. Focus on one task at a time.    4. The patient may benefit from engaging in mindfulness practices. This may include breathing techniques, apps that provide guided meditation, or more interactive activities such as coloring.    5. Develop a \"coping skills jar/box.\" This entails designating a certain container to hold slips of paper with distraction technique ideas written on each slip of paper. Distraction techniques may include listening to a certain type of music, playing on game on your phone, doing a breathing exercise, spending time with a pet, calling a certain individual, looking at a magazine, working on a puzzle, etc. When feeling distressed, choose a slip of paper from the container and engage in that activity rather than focusing on the problem.      Inocencia Escamilla PsyD,   Clinical Psychologist    "

## 2023-04-24 ENCOUNTER — VIRTUAL VISIT (OUTPATIENT)
Dept: PEDIATRICS | Facility: CLINIC | Age: 38
End: 2023-04-24
Payer: COMMERCIAL

## 2023-04-24 DIAGNOSIS — R41.840 ATTENTION DEFICIT: Primary | ICD-10-CM

## 2023-04-24 DIAGNOSIS — R00.2 PALPITATIONS: ICD-10-CM

## 2023-04-24 PROCEDURE — 99214 OFFICE O/P EST MOD 30 MIN: CPT | Mod: GT | Performed by: INTERNAL MEDICINE

## 2023-04-24 PROCEDURE — 2894A EKG 12-LEAD COMPLETE W/READ - CLINICS: CPT | Mod: 95 | Performed by: INTERNAL MEDICINE

## 2023-04-24 NOTE — Clinical Note
Please go over pt instructions w/ pt. If she's okay with cancelling 7/7 we can do that (and please make TAYA for me). Thanks! Cristi

## 2023-04-24 NOTE — PROGRESS NOTES
"Minerva is a 37 year old who is being evaluated via a billable video visit.      Assessment & Plan       ICD-10-CM    1. Attention deficit  R41.840 buPROPion (WELLBUTRIN) 75 MG tablet     buPROPion (WELLBUTRIN XL) 150 MG 24 hr tablet      2. Palpitations  R00.2 Basic metabolic panel  (Ca, Cl, CO2, Creat, Gluc, K, Na, BUN)     CBC with platelets     TSH with free T4 reflex     EKG 12-lead complete w/read - Clinics     Adult Leadless EKG Monitor 8 to 14 Days        Doesn't feel like she has anxiety or low mood - decided to try to target perhaps some irritability/lack of focus w/ wellbutrin since it is a nonstimulant treatment for ADHD.  --------------------------  PATIENT INSTRUCTIONS    Patient Instructions   Good to talk to you!    # Focusing  - let's start wellbutrin (I did some more reading and we try to avoid this in people who are actively dealing with an eating disorder).  - starting low dose (there are two different bottles so we can slowly increase your dose)  ---- take 75 mg daily for 2 weeks  ---- then increase to 150 mg extended release daily  - most do not have any significant side effects but let me know if any severe side effects are persisting beyond 2-3 weeks    # Palpitations  - my team will have you into clinic to check BP, EKG, and nonfasting labs  - heart monitor ordered - you'll wear it for 2 weeks and then mail it back.     We can review all of this on 7/14 (I'm okay with cancelling the 7/7).       --------------------------               BMI:   Estimated body mass index is 34.45 kg/m  as calculated from the following:    Height as of 11/18/22: 1.651 m (5' 5\").    Weight as of 11/18/22: 93.9 kg (207 lb).           Jeromy Hernandez MD  [facility] JESENIA Palma is a 37 year old, presenting for the following health issues:  No chief complaint on file.         View : No data to display.              History of Present Illness       Reason for visit:  Adhd " "assessment    She eats 2-3 servings of fruits and vegetables daily.She consumes 0 sweetened beverage(s) daily.She exercises with enough effort to increase her heart rate 10 to 19 minutes per day.  She exercises with enough effort to increase her heart rate 4 days per week. She is missing 3 dose(s) of medications per week.  She is not taking prescribed medications regularly due to remembering to take.       Had ADHD assessment in Feb-April 2023  Test results are not consistent with an ADHD diagnosis. Symptoms are better explained by depression and anxiety disorders.     \"Patient self-reported seven symptoms of inattention, one symptom of hyperactivity, and indicated that her abilities to function effectively at home are significantly impaired. Further, her self-reported symptoms on Cynthia measures of ADHD symptoms were consistent with this information. Both the patient and her mother recalled significant symptoms in childhood.  The patient reported that when her mother was completing her portion of the questionnaire, she stated that she did not remember much from that time.  And believes that the patient's hyperactivity was present when she was very young, prior to the age of 8, but that she grew out of it after that point.  An objective measure of personality indicated some depression and anxiety symptoms.  Major health concerns also present.  It is likely that her difficulties with attention, concentration, and memory are increasing and/or exacerbated by these mental health symptoms.     ADHD is associated with significant deficits in attention, processing speed, executive functioning, and working memory capabilities.  Inconsistent with this diagnosis, the patient scored in the average and above average ranges in all of these areas.     F41.9 Unspecified Anxiety Disorder  F32.9 Unspecified Depressive Disorder\"    Social   Child graduating this year - going to go to [email or web address]  Doing all the house projects before " hosting a graduation party     Did the ADHD assessment - she kind of hated it.   She didn't feel like she was heard - she is having some trouble at work (it's less of a big deal at work because of the nature of her job).     Anxiety - she feels like the anxiety comes from her not being able to get things done -> not that she's anxious and then can't do things  - never been on medication  - history of some purging for 6 months, probably 8-10 years ago - has no concern about body image right now    # Palpitations  - notices this the most when she's laying down, then doesn't hear other things  - almost makes her feel like breath being taken away   - feels like it's fluttering, really fast but quietly,   - not hard to breathe  - feels like she's gotten close to passing out, tunnel vision, crouches down on the floor (hasn't happened in some time).   - episodes -- used to be less but more now.   - no pattern - not related to stress  - maybe less if she eats more salt (lightheadedness)    Review of Systems         Objective           Vitals:  No vitals were obtained today due to virtual visit.    Physical Exam   GENERAL: Healthy, alert and no distress  EYES: Eyes grossly normal to inspection.  No discharge or erythema, or obvious scleral/conjunctival abnormalities.  RESP: No audible wheeze, cough, or visible cyanosis.  No visible retractions or increased work of breathing.    SKIN: Visible skin clear. No significant rash, abnormal pigmentation or lesions.  NEURO: Cranial nerves grossly intact.  Mentation and speech appropriate for age.  PSYCH: Mentation appears normal, affect normal/bright, judgement and insight intact, normal speech and appearance well-groomed.                Video-Visit Details    Type of service:  Video Visit --> Phone after a few min because her microphone didn't work. Phone for 25 min.    Originating Location (pt. Location): Home    Distant Location (provider location):  On-site  Platform used for  Video Visit: Gosia

## 2023-04-25 ENCOUNTER — TELEPHONE (OUTPATIENT)
Dept: PEDIATRICS | Facility: CLINIC | Age: 38
End: 2023-04-25
Payer: COMMERCIAL

## 2023-04-25 RX ORDER — BUPROPION HYDROCHLORIDE 75 MG/1
75 TABLET ORAL DAILY
Qty: 14 TABLET | Refills: 0 | Status: SHIPPED | OUTPATIENT
Start: 2023-04-25 | End: 2023-07-14

## 2023-04-25 RX ORDER — BUPROPION HYDROCHLORIDE 150 MG/1
150 TABLET ORAL EVERY MORNING
Qty: 90 TABLET | Refills: 0 | Status: SHIPPED | OUTPATIENT
Start: 2023-04-25 | End: 2023-07-14

## 2023-04-25 NOTE — PATIENT INSTRUCTIONS
Good to talk to you!    # Focusing  - let's start wellbutrin (I did some more reading and we try to avoid this in people who are actively dealing with an eating disorder).  - starting low dose (there are two different bottles so we can slowly increase your dose)  ---- take 75 mg daily for 2 weeks  ---- then increase to 150 mg extended release daily  - most do not have any significant side effects but let me know if any severe side effects are persisting beyond 2-3 weeks    # Palpitations  - my team will have you into clinic to check BP, EKG, and nonfasting labs  - heart monitor ordered - you'll wear it for 2 weeks and then mail it back.     We can review all of this on 7/14 (I'm okay with cancelling the 7/7).

## 2023-04-25 NOTE — TELEPHONE ENCOUNTER
Called patient to review provider recommendations as asked. Reviewed with patient and scheduled MA visit for 05/01/2023.    Patient verbally understood all directions and had no further questions.     AMOL Gautam on 4/25/2023 at 3:46 PM

## 2023-05-01 ENCOUNTER — LAB (OUTPATIENT)
Dept: LAB | Facility: CLINIC | Age: 38
End: 2023-05-01
Payer: COMMERCIAL

## 2023-05-01 ENCOUNTER — ALLIED HEALTH/NURSE VISIT (OUTPATIENT)
Dept: PEDIATRICS | Facility: CLINIC | Age: 38
End: 2023-05-01
Payer: COMMERCIAL

## 2023-05-01 VITALS — HEART RATE: 80 BPM | DIASTOLIC BLOOD PRESSURE: 79 MMHG | SYSTOLIC BLOOD PRESSURE: 115 MMHG

## 2023-05-01 DIAGNOSIS — R00.2 PALPITATIONS: ICD-10-CM

## 2023-05-01 LAB
ANION GAP SERPL CALCULATED.3IONS-SCNC: 11 MMOL/L (ref 7–15)
BUN SERPL-MCNC: 12.8 MG/DL (ref 6–20)
CALCIUM SERPL-MCNC: 9.6 MG/DL (ref 8.6–10)
CHLORIDE SERPL-SCNC: 104 MMOL/L (ref 98–107)
CREAT SERPL-MCNC: 0.83 MG/DL (ref 0.51–0.95)
DEPRECATED HCO3 PLAS-SCNC: 22 MMOL/L (ref 22–29)
ERYTHROCYTE [DISTWIDTH] IN BLOOD BY AUTOMATED COUNT: 15.4 % (ref 10–15)
GFR SERPL CREATININE-BSD FRML MDRD: >90 ML/MIN/1.73M2
GLUCOSE SERPL-MCNC: 81 MG/DL (ref 70–99)
HCT VFR BLD AUTO: 37.2 % (ref 35–47)
HGB BLD-MCNC: 11.8 G/DL (ref 11.7–15.7)
MCH RBC QN AUTO: 26.9 PG (ref 26.5–33)
MCHC RBC AUTO-ENTMCNC: 31.7 G/DL (ref 31.5–36.5)
MCV RBC AUTO: 85 FL (ref 78–100)
PLATELET # BLD AUTO: 248 10E3/UL (ref 150–450)
POTASSIUM SERPL-SCNC: 4.8 MMOL/L (ref 3.4–5.3)
RBC # BLD AUTO: 4.39 10E6/UL (ref 3.8–5.2)
SODIUM SERPL-SCNC: 137 MMOL/L (ref 136–145)
TSH SERPL DL<=0.005 MIU/L-ACNC: 0.9 UIU/ML (ref 0.3–4.2)
WBC # BLD AUTO: 5 10E3/UL (ref 4–11)

## 2023-05-01 PROCEDURE — 99207 PR NO CHARGE NURSE ONLY: CPT

## 2023-05-01 PROCEDURE — 82728 ASSAY OF FERRITIN: CPT

## 2023-05-01 PROCEDURE — 93000 ELECTROCARDIOGRAM COMPLETE: CPT

## 2023-05-01 PROCEDURE — 85027 COMPLETE CBC AUTOMATED: CPT

## 2023-05-01 PROCEDURE — 36415 COLL VENOUS BLD VENIPUNCTURE: CPT

## 2023-05-01 PROCEDURE — 80048 BASIC METABOLIC PNL TOTAL CA: CPT

## 2023-05-01 PROCEDURE — 84443 ASSAY THYROID STIM HORMONE: CPT

## 2023-05-01 NOTE — PROGRESS NOTES
Minerva Cardona is a 37 year old patient who comes in today for a Blood Pressure check and EKG .  Initial BP:  /79 (BP Location: Right arm, Patient Position: Sitting, Cuff Size: Adult Large)   Pulse 80      Data Unavailable  Disposition: results routed to provider      EKG reviewed by Erica Garcia - She asdvised that it is Ok for patient to leave and to route EKG to Shikha Puckett for further review

## 2023-05-03 LAB — FERRITIN SERPL-MCNC: 13 NG/ML (ref 6–175)

## 2023-05-10 ENCOUNTER — TELEPHONE (OUTPATIENT)
Dept: PEDIATRICS | Facility: CLINIC | Age: 38
End: 2023-05-10
Payer: COMMERCIAL

## 2023-05-10 DIAGNOSIS — R00.2 PALPITATIONS: Primary | ICD-10-CM

## 2023-05-10 NOTE — TELEPHONE ENCOUNTER
Thom from [facility] calling to inquire about the heart monitor order that was placed but is now gone. Patient has a 7:45 am appointment time tomorrow to have it placed.    It appears per chart review that the order was cancelled on 05/01/2023, but per your result note for the BP check/ EKG on 05/01/2023 you were hoping to go voer the longer heart monitor in July at her appointment.     Please advise if cancelling order was in error.       [phone]- Call back Number to Nestor Gautam RN on 5/10/2023 at 2:44 PM

## 2023-05-10 NOTE — TELEPHONE ENCOUNTER
Called Thom back at [facility] and informed him the order was reordered. He was able to see and link it to her appointment for tomorrow.     AMOL Gautam on 5/10/2023 at 3:08 PM

## 2023-05-10 NOTE — TELEPHONE ENCOUNTER
I'm not sure on the cancelled order - reordered.     SANTOS Hernandez MD  Internal Medicine-Pediatrics

## 2023-05-11 ENCOUNTER — HOSPITAL ENCOUNTER (OUTPATIENT)
Dept: CARDIOLOGY | Facility: CLINIC | Age: 38
Discharge: HOME | End: 2023-05-11
Attending: INTERNAL MEDICINE | Admitting: INTERNAL MEDICINE
Payer: COMMERCIAL

## 2023-05-11 DIAGNOSIS — R00.2 PALPITATIONS: ICD-10-CM

## 2023-05-11 PROCEDURE — 93248 EXT ECG>7D<15D REV&INTERPJ: CPT | Performed by: INTERNAL MEDICINE

## 2023-05-11 PROCEDURE — 93246 EXT ECG>7D<15D RECORDING: CPT

## 2023-06-12 DIAGNOSIS — R41.840 ATTENTION DEFICIT: ICD-10-CM

## 2023-06-14 RX ORDER — BUPROPION HYDROCHLORIDE 75 MG/1
75 TABLET ORAL DAILY
Qty: 14 TABLET | Refills: 0 | OUTPATIENT
Start: 2023-06-14

## 2023-07-13 ASSESSMENT — ENCOUNTER SYMPTOMS
SORE THROAT: 0
DIARRHEA: 0
DIZZINESS: 0
HEADACHES: 0
WEAKNESS: 0
NERVOUS/ANXIOUS: 0
DYSURIA: 0
NAUSEA: 0
ARTHRALGIAS: 1
COUGH: 0
EYE PAIN: 0
PALPITATIONS: 1
HEMATURIA: 0
PARESTHESIAS: 0
BREAST MASS: 0
MYALGIAS: 1
FEVER: 0
HEMATOCHEZIA: 0
HEARTBURN: 1
JOINT SWELLING: 0
CONSTIPATION: 0
ABDOMINAL PAIN: 0
CHILLS: 0
FREQUENCY: 0

## 2023-07-14 ENCOUNTER — OFFICE VISIT (OUTPATIENT)
Dept: PEDIATRICS | Facility: CLINIC | Age: 38
End: 2023-07-14
Payer: COMMERCIAL

## 2023-07-14 VITALS
BODY MASS INDEX: 34.94 KG/M2 | WEIGHT: 209.7 LBS | OXYGEN SATURATION: 100 % | RESPIRATION RATE: 22 BRPM | HEIGHT: 65 IN | TEMPERATURE: 98.8 F | SYSTOLIC BLOOD PRESSURE: 126 MMHG | DIASTOLIC BLOOD PRESSURE: 86 MMHG | HEART RATE: 84 BPM

## 2023-07-14 DIAGNOSIS — M25.562 CHRONIC PAIN OF BOTH KNEES: ICD-10-CM

## 2023-07-14 DIAGNOSIS — G89.29 CHRONIC PAIN OF BOTH KNEES: ICD-10-CM

## 2023-07-14 DIAGNOSIS — N62 LARGE BREASTS: ICD-10-CM

## 2023-07-14 DIAGNOSIS — M25.561 CHRONIC PAIN OF BOTH KNEES: ICD-10-CM

## 2023-07-14 DIAGNOSIS — R41.840 ATTENTION DEFICIT: ICD-10-CM

## 2023-07-14 DIAGNOSIS — Z86.19 H/O COLD SORES: ICD-10-CM

## 2023-07-14 DIAGNOSIS — Z00.00 ROUTINE GENERAL MEDICAL EXAMINATION AT A HEALTH CARE FACILITY: Primary | ICD-10-CM

## 2023-07-14 DIAGNOSIS — M25.20 JOINT LAXITY: ICD-10-CM

## 2023-07-14 DIAGNOSIS — M54.9 UPPER BACK PAIN: ICD-10-CM

## 2023-07-14 PROCEDURE — 99395 PREV VISIT EST AGE 18-39: CPT | Performed by: INTERNAL MEDICINE

## 2023-07-14 PROCEDURE — 99213 OFFICE O/P EST LOW 20 MIN: CPT | Mod: 25 | Performed by: INTERNAL MEDICINE

## 2023-07-14 RX ORDER — FAMOTIDINE 20 MG/1
20 TABLET, FILM COATED ORAL 2 TIMES DAILY
COMMUNITY

## 2023-07-14 RX ORDER — VALACYCLOVIR HYDROCHLORIDE 1 G/1
2000 TABLET, FILM COATED ORAL 2 TIMES DAILY
Qty: 24 TABLET | Refills: 3 | Status: SHIPPED | OUTPATIENT
Start: 2023-07-14 | End: 2024-05-13

## 2023-07-14 RX ORDER — BUPROPION HYDROCHLORIDE 150 MG/1
150 TABLET ORAL EVERY MORNING
Qty: 90 TABLET | Refills: 0 | Status: SHIPPED | OUTPATIENT
Start: 2023-07-14 | End: 2023-10-11

## 2023-07-14 ASSESSMENT — ENCOUNTER SYMPTOMS
HEARTBURN: 1
MYALGIAS: 1
WEAKNESS: 0
FEVER: 0
ABDOMINAL PAIN: 0
DIZZINESS: 0
JOINT SWELLING: 0
HEMATURIA: 0
EYE PAIN: 0
COUGH: 0
FREQUENCY: 0
HEADACHES: 0
CONSTIPATION: 0
NERVOUS/ANXIOUS: 0
CHILLS: 0
PARESTHESIAS: 0
DIARRHEA: 0
BREAST MASS: 0
NAUSEA: 0
ARTHRALGIAS: 1
PALPITATIONS: 1
SORE THROAT: 0
DYSURIA: 0
HEMATOCHEZIA: 0

## 2023-07-14 ASSESSMENT — PAIN SCALES - GENERAL: PAINLEVEL: NO PAIN (0)

## 2023-07-14 NOTE — PATIENT INSTRUCTIONS
I appreciate the very reasonable assessment of the Wellbutrin. Let's give it 3-4 more months to see how it does with your regular life. May talk about increasing to 300 but let's see.     Let me think about the joints more  - to start let's work on strengthening them and the surrounding muscles and see if it helps the laxity/pain  - they will call you to schedule    Left ear - debrox otc     Plastic Surgery will call you to schedule          Preventive Health Recommendations  Female Ages 26 - 39  Yearly exam:   See your health care provider every year in order to  Review health changes.   Discuss preventive care.    Review your medicines if you your doctor has prescribed any.    Until age 30: Get a Pap test every three years (more often if you have had an abnormal result).    After age 30: Talk to your doctor about whether you should have a Pap test every 3 years or have a Pap test with HPV screening every 5 years.   You do not need a Pap test if your uterus was removed (hysterectomy) and you have not had cancer.  You should be tested each year for STDs (sexually transmitted diseases), if you're at risk.   Talk to your provider about how often to have your cholesterol checked.  If you are at risk for diabetes, you should have a diabetes test (fasting glucose).  Shots: Get a flu shot each year. Get a tetanus shot every 10 years.   Nutrition:   Eat at least 5 servings of fruits and vegetables each day.  Eat whole-grain bread, whole-wheat pasta and brown rice instead of white grains and rice.  Get adequate Calcium and Vitamin D.     Lifestyle  Exercise at least 150 minutes a week (30 minutes a day, 5 days of the week). This will help you control your weight and prevent disease.  Limit alcohol to one drink per day.  No smoking.   Wear sunscreen to prevent skin cancer.  See your dentist every six months for an exam and cleaning.

## 2023-07-14 NOTE — PROGRESS NOTES
SUBJECTIVE:   CC: Minerva is an 37 year old who presents for preventive health visit.        No data to display              Healthy Habits:     Getting at least 3 servings of Calcium per day:  NO    Bi-annual eye exam:  Yes    Dental care twice a year:  Yes    Sleep apnea or symptoms of sleep apnea:  Daytime drowsiness    Diet:  Other    Frequency of exercise:  1 day/week    Duration of exercise:  Less than 15 minutes    Taking medications regularly:  No    Barriers to taking medications:  Problems remembering to take them    Medication side effects:  None    Additional concerns today:  Yes    Pt mentions additional concerns. Would like to discuss breast reduction surgery. Pt would also like to discuss joint inflammation/pain, has been going on for a while, ongoing issue and has been discussed before.    VV 4/24/23  # Focusing  - let's start wellbutrin (I did some more reading and we try to avoid this in people who are actively dealing with an eating disorder).  - starting low dose (there are two different bottles so we can slowly increase your dose)  ---- take 75 mg daily for 2 weeks  ---- then increase to 150 mg extended release daily  - most do not have any significant side effects but let me know if any severe side effects are persisting beyond 2-3 weeks     # Palpitations  - my team will have you into clinic to check BP, EKG, and nonfasting labs  - heart monitor ordered - you'll wear it for 2 weeks and then mail it back.      We can review all of this on 7/14 (I'm okay with cancelling the 7/7).     # Focusing   - hasn't noticed a huge difference   - there has been a lot of change (graduation, [employer], new puppy) hard to totally say  - no negative side effects  - maybe less anxiety    # Palpitations  - not predictable     # Joint pains  - wonders about EDS  - can pull her thumb back, pinkie back, putting palms on floor w/out   - some days its more stiff... will be walking and knee may go the other direction;  other days knees so bad they can't bend  - hips are also bad  - shoulders also bad- feel like they pop out of place  - no joint swelling      Social History     Tobacco Use     Smoking status: Never     Smokeless tobacco: Never   Substance Use Topics     Alcohol use: Yes     Comment: on occasion             2023     7:58 PM   Alcohol Use   Prescreen: >3 drinks/day or >7 drinks/week? Yes   AUDIT SCORE  8     Reviewed orders with patient.  Reviewed health maintenance and updated orders accordingly - Yes    Breast Cancer Screenin/8/2022     1:21 PM 2023     8:01 PM   Breast CA Risk Assessment (FHS-7)   Do you have a family history of breast, colon, or ovarian cancer? Yes No / Unknown     Pertinent mammograms are reviewed under the imaging tab.    History of abnormal Pap smear: See Epic.      Latest Ref Rng & Units 2022     3:37 PM 2021     9:53 AM 2021     9:25 AM   PAP / HPV   PAP  Negative for Intraepithelial Lesion or Malignancy (NILM)      PAP (Historical)    NIL    HPV 16 DNA Negative Negative  Negative     HPV 18 DNA Negative Negative  Negative     Other HR HPV Negative Negative  Negative       Reviewed and updated as needed this visit by clinical staff    Allergies  Meds              Reviewed and updated as needed this visit by Provider                   Review of Systems   Constitutional: Negative for chills and fever.   HENT: Negative for congestion, ear pain, hearing loss and sore throat.    Eyes: Negative for pain and visual disturbance.   Respiratory: Negative for cough.    Cardiovascular: Positive for palpitations. Negative for chest pain and peripheral edema.   Gastrointestinal: Positive for heartburn. Negative for abdominal pain, constipation, diarrhea, hematochezia and nausea.   Breasts:  Negative for tenderness, breast mass and discharge.   Genitourinary: Positive for pelvic pain. Negative for dysuria, frequency, genital sores, hematuria, urgency, vaginal bleeding  "and vaginal discharge.   Musculoskeletal: Positive for arthralgias and myalgias. Negative for joint swelling.   Skin: Negative for rash.   Neurological: Negative for dizziness, weakness, headaches and paresthesias.   Psychiatric/Behavioral: Negative for mood changes. The patient is not nervous/anxious.         OBJECTIVE:   /86 (BP Location: Right arm, Patient Position: Sitting, Cuff Size: Adult Large)   Pulse 84   Temp 98.8  F (37.1  C) (Tympanic)   Resp 22   Ht 1.651 m (5' 5\")   Wt 95.1 kg (209 lb 11.2 oz)   SpO2 100%   BMI 34.90 kg/m    Physical Exam  GENERAL: healthy, alert and no distress  EYES: Eyes grossly normal to inspection, PERRL and conjunctivae and sclerae normal  HENT: ear canals and TM's normal, nose and mouth without ulcers or lesions  NECK: no adenopathy, no asymmetry, masses, or scars and thyroid normal to palpation  RESP: lungs clear to auscultation - no rales, rhonchi or wheezes  CV: regular rate and rhythm, normal S1 S2, no S3 or S4, no murmur, click or rub, no peripheral edema and peripheral pulses strong  ABDOMEN: soft, nontender, no hepatosplenomegaly, no masses and bowel sounds normal  MS: no gross musculoskeletal defects noted, no edema  SKIN: no suspicious lesions or rashes  NEURO: Normal strength and tone, mentation intact and speech normal  PSYCH: mentation appears normal, affect normal/bright    Diagnostic Test Results:  Labs reviewed in Epic    ASSESSMENT/PLAN:       ICD-10-CM    1. Routine general medical examination at a health care facility  Z00.00       2. Joint laxity  M25.20 Physical Therapy Referral      3. Chronic pain of both knees  M25.561 Physical Therapy Referral    M25.562     G89.29       4. Upper back pain  M54.9 Adult Plastic Surgery  Referral      5. Large breasts  N62 Adult Plastic Surgery  Referral      6. Attention deficit  R41.840 buPROPion (WELLBUTRIN XL) 150 MG 24 hr tablet      7. H/O cold sores  Z86.19 valACYclovir (VALTREX) 1000 " "mg tablet        I appreciate the very reasonable assessment of the Wellbutrin. Let's give it 3-4 more months to see how it does with your regular life. May talk about increasing to 300 but let's see.     Let me think about the joints more  - to start let's work on strengthening them and the surrounding muscles and see if it helps the laxity/pain  - they will call you to schedule    Left ear - debrox ot     Plastic Surgery will call you to schedule    COUNSELING:  Reviewed preventive health counseling, as reflected in patient instructions      BMI:   Estimated body mass index is 34.9 kg/m  as calculated from the following:    Height as of this encounter: 1.651 m (5' 5\").    Weight as of this encounter: 95.1 kg (209 lb 11.2 oz).   Weight management plan: Discussed healthy diet and exercise guidelines      She reports that she has never smoked. She has never used smokeless tobacco.      Jeromy Hernandez MD  [facility]  "

## 2023-07-19 ENCOUNTER — THERAPY VISIT (OUTPATIENT)
Dept: PHYSICAL THERAPY | Facility: CLINIC | Age: 38
End: 2023-07-19
Attending: INTERNAL MEDICINE
Payer: COMMERCIAL

## 2023-07-19 DIAGNOSIS — M25.562 CHRONIC PAIN OF BOTH KNEES: ICD-10-CM

## 2023-07-19 DIAGNOSIS — M25.20 JOINT LAXITY: ICD-10-CM

## 2023-07-19 DIAGNOSIS — G89.29 CHRONIC PAIN OF BOTH KNEES: ICD-10-CM

## 2023-07-19 DIAGNOSIS — M25.561 CHRONIC PAIN OF BOTH KNEES: ICD-10-CM

## 2023-07-19 PROCEDURE — 97110 THERAPEUTIC EXERCISES: CPT | Mod: GP | Performed by: PHYSICAL THERAPIST

## 2023-07-19 PROCEDURE — 97161 PT EVAL LOW COMPLEX 20 MIN: CPT | Mod: GP | Performed by: PHYSICAL THERAPIST

## 2023-07-19 ASSESSMENT — ACTIVITIES OF DAILY LIVING (ADL)
GO UP STAIRS: ACTIVITY IS MINIMALLY DIFFICULT
WALK: ACTIVITY IS MINIMALLY DIFFICULT
SIT WITH YOUR KNEE BENT: ACTIVITY IS MINIMALLY DIFFICULT
SWELLING: I DO NOT HAVE THE SYMPTOM
KNEE_ACTIVITY_OF_DAILY_LIVING_SCORE: 71.43
HOW_WOULD_YOU_RATE_THE_CURRENT_FUNCTION_OF_YOUR_KNEE_DURING_YOUR_USUAL_DAILY_ACTIVITIES_ON_A_SCALE_FROM_0_TO_100_WITH_100_BEING_YOUR_LEVEL_OF_KNEE_FUNCTION_PRIOR_TO_YOUR_INJURY_AND_0_BEING_THE_INABILITY_TO_PERFORM_ANY_OF_YOUR_USUAL_DAILY_ACTIVITIES?: 70
GIVING WAY, BUCKLING OR SHIFTING OF KNEE: THE SYMPTOM AFFECTS MY ACTIVITY MODERATELY
RISE FROM A CHAIR: ACTIVITY IS NOT DIFFICULT
STIFFNESS: I HAVE THE SYMPTOM BUT IT DOES NOT AFFECT MY ACTIVITY
GO DOWN STAIRS: ACTIVITY IS SOMEWHAT DIFFICULT
HOW_WOULD_YOU_RATE_THE_OVERALL_FUNCTION_OF_YOUR_KNEE_DURING_YOUR_USUAL_DAILY_ACTIVITIES?: NEARLY NORMAL
PAIN: THE SYMPTOM AFFECTS MY ACTIVITY SLIGHTLY
SQUAT: ACTIVITY IS SOMEWHAT DIFFICULT
KNEEL ON THE FRONT OF YOUR KNEE: ACTIVITY IS VERY DIFFICULT
LIMPING: I DO NOT HAVE THE SYMPTOM
AS_A_RESULT_OF_YOUR_KNEE_INJURY,_HOW_WOULD_YOU_RATE_YOUR_CURRENT_LEVEL_OF_DAILY_ACTIVITY?: ABNORMAL
KNEE_ACTIVITY_OF_DAILY_LIVING_SUM: 50
STAND: ACTIVITY IS MINIMALLY DIFFICULT
RAW_SCORE: 50
WEAKNESS: THE SYMPTOM AFFECTS MY ACTIVITY SLIGHTLY

## 2023-07-19 NOTE — PROGRESS NOTES
PHYSICAL THERAPY EVALUATION  Type of Visit: Evaluation    See electronic medical record for Abuse and Falls Screening details.    Subjective  Pt reports that generally she has laxity all over, but worst in the knees. Feels like her knees move too much. Has had trouble with her knees for 10 years since trying a triathalon. Pain is worst with walking, standing, running, stairs, certain positions. Denies vague symptoms. Would like to get rid of this pain and return to PLOF pain free. Wants to get back to running without pain, hike without issue, and improve overall knee function.        Presenting condition or subjective complaint: joint laxity  Date of onset: 07/19/23    Relevant medical history: Anemia; Asthma   Dates & types of surgery:      Prior diagnostic imaging/testing results:       Prior therapy history for the same diagnosis, illness or injury: No      Living Environment  Social support: With family members   Type of home: House; Multi-level   Stairs to enter the home: Yes 1     Ramp: No   Stairs inside the home: Yes 8 Is there a railing: Yes   Help at home: None  Equipment owned:       Employment: Yes    Hobbies/Interests: hiking, reading, gardening    Patient goals for therapy: walk longer, hike without pain    Pain assessment: Pain present  Location: anterior/posterior kaylee knee/Rating: Can be a sharp pulling pain, achy at rest     Objective   Gait:  unremarkable  Screening: negative    Flexibility: unremarkable    Knee AROM/PROM: R 5-0-150, L 5-0-150    Hip ROM: WFL kaylee pain free    Knee Strength (* = pain) Right Left   FL 5/5 5/5   EXT 5-/5 5-/5*   Quad Contraction (Good/Fair/Poor) fair fair   Hip Extension 4-/5 4-/5   Hip ABD 3+/5 3+/5     Special Tests Right Left   Lachman - -   Anterior Drawer - -   Posterior Drawer - -   Valgus Stress - Medial Instability - -   Varus Stress - Lateral Instability - -   Zulma (Lateral Meniscus) - -   Zulma (Medial Meniscus) - -   Patellar Grind Test        Palpation Tenderness:  Medial joint line right knee     Swelling/Circumferential Measurements: unremarkable      Accessory Motion: hypomobile patellar mobility daljit all planes with pain all directions daljit    Functional Squat: poor squat with pain     Balance: fair    Other tests: none    Assessment & Plan   CLINICAL IMPRESSIONS  Medical Diagnosis: Chronic pain of both knees, joint laxity    Treatment Diagnosis: Daljit knee pain   Impression/Assessment: Patient is a 38 year old female with daljit knee complaints.  The following significant findings have been identified: Pain, Decreased ROM/flexibility, Decreased joint mobility, Decreased strength, Impaired balance, Decreased proprioception, Impaired sensation, Inflammation, Edema, Impaired gait, Impaired muscle performance and Decreased activity tolerance. These impairments interfere with their ability to perform self care tasks, work tasks, recreational activities, household chores, driving , household mobility and community mobility as compared to previous level of function.     Clinical Decision Making (Complexity):  Clinical Presentation: Stable/Uncomplicated  Clinical Presentation Rationale: based on medical and personal factors listed in PT evaluation  Clinical Decision Making (Complexity): Low complexity    PLAN OF CARE  Treatment Interventions:  Interventions: Gait Training, Manual Therapy, Neuromuscular Re-education, Therapeutic Activity, Therapeutic Exercise, Self-Care/Home Management    Long Term Goals     PT Goal 1  Goal Identifier: Ambulation  Goal Description: Patient will be able to walk unrestricted distances without device pain free with normalized gati pattern  Rationale: to maximize safety and independence with performance of ADLs and functional tasks;to maximize safety and independence within the home;to maximize safety and independence within the community;to maximize safety and independence with transportation;to maximize safety and independence  with self cares  Goal Progress: new goal  Target Date: 09/27/23  PT Goal 2  Goal Identifier: Squatting  Goal Description: Patient will be able to perform a full depth squat pain free without deviation pain free  Rationale: to maximize safety and independence within the community;to maximize safety and independence with transportation;to maximize safety and independence within the home;to maximize safety and independence with performance of ADLs and functional tasks;to maximize safety and independence with self cares  Goal Progress: new goal  Target Date: 09/27/23      Frequency of Treatment: 1 x week  Duration of Treatment: 10 weeks    Recommended Referrals to Other Professionals: none  Education Assessment:   Learner/Method: Patient;No Barriers to Learning  Education Comments: no concerns    Risks and benefits of evaluation/treatment have been explained.   Patient/Family/caregiver agrees with Plan of Care.     Evaluation Time:     PT Eval, Low Complexity Minutes (74182): 15     Signing Clinician: Kofi Hernandez PT

## 2023-08-04 ENCOUNTER — TELEPHONE (OUTPATIENT)
Dept: SURGERY | Facility: CLINIC | Age: 38
End: 2023-08-04

## 2023-08-04 ENCOUNTER — OFFICE VISIT (OUTPATIENT)
Dept: SURGERY | Facility: CLINIC | Age: 38
End: 2023-08-04
Attending: INTERNAL MEDICINE
Payer: COMMERCIAL

## 2023-08-04 VITALS — HEIGHT: 65 IN | BODY MASS INDEX: 34.81 KG/M2 | WEIGHT: 208.9 LBS

## 2023-08-04 DIAGNOSIS — N62 LARGE BREASTS: ICD-10-CM

## 2023-08-04 DIAGNOSIS — M54.9 UPPER BACK PAIN: ICD-10-CM

## 2023-08-04 PROCEDURE — 99204 OFFICE O/P NEW MOD 45 MIN: CPT | Performed by: STUDENT IN AN ORGANIZED HEALTH CARE EDUCATION/TRAINING PROGRAM

## 2023-08-04 NOTE — TELEPHONE ENCOUNTER
8/4 Patient scheduled.     Cinthia guillory Procedure   Dermatology, Surgery, Urology  [facility]

## 2023-08-04 NOTE — LETTER
"    8/4/2023         RE: Minerva Cardona  [address] [facility] [address]        Dear Colleague,    Thank you for referring your patient, Minerva Cardona, to the [facility]. Please see a copy of my visit note below.    PRS    HPI: 38-year-old female presenting with bilateral symptomatic macromastia.  Patient has HH cup breasts and would like to be smaller.  She has been weight stable.  She simply wants less weight on her upper chest.  Patient suffers from upper back, neck and shoulder pain.  Patient has shoulder bra strap grooving.  Patient will occasionally have rashes in the inframammary area especially during warmer months.  She has been treating it by keeping the underside of the breasts dry. She has tried conservative management for years with no or incomplete relief.  Patient is done chiropractic treatments for at least 6 months with no relief.  She states that the large breasts inhibit her from performing daily activities including exercise.  No breast masses, lumps, nipple discharge or swelling in the armpit.  Patient has not had a mammogram yet.     ROS: Negative, see HPI  Past medical history: Nondiabetic  Past surgical history: No surgeries on the breasts  Medications: No blood thinners  Allergies: None  Family history: No bleeding or clotting problems or problems with anesthesia.  No breast cancer history in the family.  Social history: Non-smoker, denies any tobacco or nicotine use     Examination:  Ht 1.651 m (5' 5\")   Wt 94.8 kg (208 lb 14.4 oz)   BMI 34.76 kg/m    Nonlabored breathing  Not distressed  Bilateral grade 2 ptotic breasts  No breast masses, lumps, nipple discharge, axillary lymphadenopathy or skin changes  Breast measurements:  Sternal notch nipple distance: 35 cm on the left, 34 cm on the right  Nipple inframammary crease distance: 16 cm on the left, 16 cm on the right  Breast base width: 20 cm on the left, 20 cm on the right  Nipple " midline distance: 13.5 cm on the left, 14 cm on the right  Areolar diameter: 6.5 cm on the left, 7 cm on the right     Screening mammogram: None     Schnur: 2.0 BSA, 628 grams per side     A/P: 38-year-old female presenting with symptomatic bilateral macromastia     -Patient is a good candidate for bilateral breast reduction.  The plan would be for a inferior pedicle with inverted T skin resection.  The goals of breast reduction surgery include to alleviate the symptoms of macromastia, to make the breasts body appropriate and aesthetically appealing, and to not cause problems like wound healing issues or hematoma.  -Discussed the risks of surgery, including but not limited to: infection, bleeding, hematoma, seroma, poor scarring, wound healing issues, pain, nipple sensitivity issues or decreased sensation, loss of nipple areola, need for free nipple grafting, asymmetry, need for revision surgery, suboptimal aesthetic result, DVT, PE, death.  Despite these risks, patient consents to and would like to proceed with possibly scheduling bilateral breast reduction.  -Photography today  -We will initiate prior authorization request  -Once PA is approved, we will plan to schedule surgery  -A total of 45 minutes was devoted to review of chart, direct face-to-face patient counseling and documentation during this encounter, exclusive of any procedure performed.     Hardeep Arredondo MD, PhD      Again, thank you for allowing me to participate in the care of your patient.        Sincerely,        Hardeep Arredondo MD

## 2023-08-04 NOTE — PROGRESS NOTES
"PRS    HPI: 38-year-old female presenting with bilateral symptomatic macromastia.  Patient has HH cup breasts and would like to be smaller.  She has been weight stable.  She simply wants less weight on her upper chest.  Patient suffers from upper back, neck and shoulder pain.  Patient has shoulder bra strap grooving.  Patient will occasionally have rashes in the inframammary area especially during warmer months.  She has been treating it by keeping the underside of the breasts dry. She has tried conservative management for years with no or incomplete relief.  Patient is done chiropractic treatments for at least 6 months with no relief.  She states that the large breasts inhibit her from performing daily activities including exercise.  No breast masses, lumps, nipple discharge or swelling in the armpit.  Patient has not had a mammogram yet.     ROS: Negative, see HPI  Past medical history: Nondiabetic  Past surgical history: No surgeries on the breasts  Medications: No blood thinners  Allergies: None  Family history: No bleeding or clotting problems or problems with anesthesia.  No breast cancer history in the family.  Social history: Non-smoker, denies any tobacco or nicotine use     Examination:  Ht 1.651 m (5' 5\")   Wt 94.8 kg (208 lb 14.4 oz)   BMI 34.76 kg/m    Nonlabored breathing  Not distressed  Bilateral grade 2 ptotic breasts  No breast masses, lumps, nipple discharge, axillary lymphadenopathy or skin changes  Breast measurements:  Sternal notch nipple distance: 35 cm on the left, 34 cm on the right  Nipple inframammary crease distance: 16 cm on the left, 16 cm on the right  Breast base width: 20 cm on the left, 20 cm on the right  Nipple midline distance: 13.5 cm on the left, 14 cm on the right  Areolar diameter: 6.5 cm on the left, 7 cm on the right     Screening mammogram: None     Schnur: 2.0 BSA, 628 grams per side     A/P: 38-year-old female presenting with symptomatic bilateral macromastia   "   -Patient is a good candidate for bilateral breast reduction.  The plan would be for a inferior pedicle with inverted T skin resection.  The goals of breast reduction surgery include to alleviate the symptoms of macromastia, to make the breasts body appropriate and aesthetically appealing, and to not cause problems like wound healing issues or hematoma.  -Discussed the risks of surgery, including but not limited to: infection, bleeding, hematoma, seroma, poor scarring, wound healing issues, pain, nipple sensitivity issues or decreased sensation, loss of nipple areola, need for free nipple grafting, asymmetry, need for revision surgery, suboptimal aesthetic result, DVT, PE, death.  Despite these risks, patient consents to and would like to proceed with possibly scheduling bilateral breast reduction.  -Photography today  -We will initiate prior authorization request  -Once PA is approved, we will plan to schedule surgery  -A total of 45 minutes was devoted to review of chart, direct face-to-face patient counseling and documentation during this encounter, exclusive of any procedure performed.     Hardeep Arredondo MD, PhD

## 2023-08-04 NOTE — NURSING NOTE
"Minerva Cardona's goals for this visit include:   Chief Complaint   Patient presents with    Consult     Breast reduction        She requests these members of her care team be copied on today's visit information: no    PCP: Jeromy Hernandez    Referring Provider:  Jeromy Hernandez MD  [address] [place] [address]    Ht 1.651 m (5' 5\")   Wt 94.8 kg (208 lb 14.4 oz)   BMI 34.76 kg/m      Do you need any medication refills at today's visit? No    Lashae Silver LPN      "

## 2023-08-07 ENCOUNTER — THERAPY VISIT (OUTPATIENT)
Dept: PHYSICAL THERAPY | Facility: CLINIC | Age: 38
End: 2023-08-07
Payer: COMMERCIAL

## 2023-08-07 ENCOUNTER — ANCILLARY PROCEDURE (OUTPATIENT)
Dept: MAMMOGRAPHY | Facility: CLINIC | Age: 38
End: 2023-08-07
Attending: STUDENT IN AN ORGANIZED HEALTH CARE EDUCATION/TRAINING PROGRAM
Payer: COMMERCIAL

## 2023-08-07 ENCOUNTER — VIRTUAL VISIT (OUTPATIENT)
Dept: INTERNAL MEDICINE | Facility: CLINIC | Age: 38
End: 2023-08-07
Payer: COMMERCIAL

## 2023-08-07 DIAGNOSIS — M25.20 JOINT LAXITY: Primary | ICD-10-CM

## 2023-08-07 DIAGNOSIS — G89.29 CHRONIC PAIN OF BOTH KNEES: ICD-10-CM

## 2023-08-07 DIAGNOSIS — E66.811 CLASS 1 OBESITY DUE TO EXCESS CALORIES WITHOUT SERIOUS COMORBIDITY WITH BODY MASS INDEX (BMI) OF 34.0 TO 34.9 IN ADULT: Primary | ICD-10-CM

## 2023-08-07 DIAGNOSIS — M25.561 CHRONIC PAIN OF BOTH KNEES: ICD-10-CM

## 2023-08-07 DIAGNOSIS — M25.562 CHRONIC PAIN OF BOTH KNEES: ICD-10-CM

## 2023-08-07 DIAGNOSIS — E66.09 CLASS 1 OBESITY DUE TO EXCESS CALORIES WITHOUT SERIOUS COMORBIDITY WITH BODY MASS INDEX (BMI) OF 34.0 TO 34.9 IN ADULT: Primary | ICD-10-CM

## 2023-08-07 DIAGNOSIS — N62 LARGE BREASTS: ICD-10-CM

## 2023-08-07 PROCEDURE — 77067 SCR MAMMO BI INCL CAD: CPT | Mod: TC | Performed by: RADIOLOGY

## 2023-08-07 PROCEDURE — 97110 THERAPEUTIC EXERCISES: CPT | Mod: GP | Performed by: PHYSICAL THERAPIST

## 2023-08-07 PROCEDURE — 99213 OFFICE O/P EST LOW 20 MIN: CPT | Mod: VID

## 2023-08-07 PROCEDURE — 97112 NEUROMUSCULAR REEDUCATION: CPT | Mod: GP | Performed by: PHYSICAL THERAPIST

## 2023-08-07 NOTE — PROGRESS NOTES
Minerva is a 38 year old who is being evaluated via a billable video visit.      How would you like to obtain your AVS? MyChart  If the video visit is dropped, the invitation should be resent by: Send to e-mail at: [email or web address]  Will anyone else be joining your video visit? No      Assessment & Plan     Class 1 obesity due to excess calories without serious comorbidity with body mass index (BMI) of 34.0 to 34.9 in adult  Patient needs to lose weight to have breast reduction surgery. Discussed healthy diet and eating habits with patient. She has been doing the things that I mention like portion control, lean meats and increasing vegetables. I will refer to weight management clinic for weight management medication    Recent TSH, and glucose WNL  - Adult Comprehensive Weight Management  Referral; Future    Papa Tavarez NP  [facility]    Brant Palma is a 38 year old, presenting for the following health issues:  Weight Loss (/)      History of Present Illness       Reason for visit:  Weight and weight loss    She eats 2-3 servings of fruits and vegetables daily.She consumes 0 sweetened beverage(s) daily.She exercises with enough effort to increase her heart rate 10 to 19 minutes per day.  She exercises with enough effort to increase her heart rate 4 days per week. She is missing 2 dose(s) of medications per week.  She is not taking prescribed medications regularly due to remembering to take.     Looking for help with weight loss before breast reduction surgery    Review of Systems   Constitutional, HEENT, cardiovascular, pulmonary, gi and gu systems are negative, except as otherwise noted.      Objective         Vitals:  No vitals were obtained today due to virtual visit.    Physical Exam   GENERAL: alert and no distress  EYES: Eyes grossly normal to inspection.  No discharge or erythema, or obvious scleral/conjunctival abnormalities.  RESP: No audible  wheeze, cough, or visible cyanosis.  No visible retractions or increased work of breathing.    SKIN: Visible skin clear. No significant rash, abnormal pigmentation or lesions.  NEURO: Cranial nerves grossly intact.  Mentation and speech appropriate for age.  PSYCH: Mentation appears normal, affect normal/bright, judgement and insight intact, normal speech and appearance well-groomed.      Video-Visit Details    Type of service:  Video Visit     Originating Location (pt. Location): Home  Distant Location (provider location):  On-site  Platform used for Video Visit: Original

## 2023-08-22 ENCOUNTER — HOSPITAL ENCOUNTER (OUTPATIENT)
Dept: ULTRASOUND IMAGING | Facility: CLINIC | Age: 38
Discharge: HOME | End: 2023-08-22
Attending: STUDENT IN AN ORGANIZED HEALTH CARE EDUCATION/TRAINING PROGRAM
Payer: COMMERCIAL

## 2023-08-22 ENCOUNTER — HOSPITAL ENCOUNTER (OUTPATIENT)
Dept: MAMMOGRAPHY | Facility: CLINIC | Age: 38
Discharge: HOME | End: 2023-08-22
Attending: STUDENT IN AN ORGANIZED HEALTH CARE EDUCATION/TRAINING PROGRAM
Payer: COMMERCIAL

## 2023-08-22 DIAGNOSIS — R92.8 ABNORMAL MAMMOGRAM: ICD-10-CM

## 2023-08-22 PROCEDURE — 76642 ULTRASOUND BREAST LIMITED: CPT | Mod: LT

## 2023-08-22 PROCEDURE — 77061 BREAST TOMOSYNTHESIS UNI: CPT | Mod: LT

## 2023-08-25 ENCOUNTER — TELEPHONE (OUTPATIENT)
Dept: PLASTIC SURGERY | Facility: CLINIC | Age: 38
End: 2023-08-25
Payer: COMMERCIAL

## 2023-08-25 NOTE — TELEPHONE ENCOUNTER
PB DOS: TBD  Type of Procedure: BRM  CPT Codes: 92167  ICD10 Codes: N62  Surgeon/Ordering provider: Hardeep Arredondo MD [id number]  Pre-cert/Authorization completed:  Predetermination required and pending, clinicals and photos uploaded   Payer: [employer]  Spoke to Malgorzata MCNALLY  Ref. # [id number]/ Auth #   Valid Dates: 8/25/23-11/22/23    Please advise the patient to contact their insurance for coverage and benefits. Prior authorization is not a guarantee of payment. Payment is based on the patient's benefit plan documents such as copays, deductibles and out of pocket minimums.

## 2023-08-25 NOTE — TELEPHONE ENCOUNTER
----- Message from Laura Danielle RN sent at 8/25/2023 11:37 AM CDT -----  Regarding: RE: PA for breast reduction  Hi Solange    I just wanted to follow up on this patient. It looks like pt saw  on 8/4/23 and he sent a request to financial counseling team and clinic team with the request to start a PA. I dont see anything further has been done  I know you sent the message below but when I asked my supervisor Kiersten on 8/8 what should be done about the existing PA requests she received communication back (see below) from Kiersten Cody. Since the request was initiated on 8/4 can you submit for a PA or does a case request need to be entered?    Thanks   Laura           That is correct they will finish them out.  Keep me posted if yes you don't see this happening    Thank you      Elena Mayes Supervisor, [employer]      ----- Message -----  From: Solange Vaughan  Sent: 8/4/2023  12:24 PM CDT  To: Hardeep Arredondo MD; Laura Danielle RN; #  Subject: RE: PA for breast reduction                      Hello team, as many of you are aware, our  FC team is moving over to the [facility] PA team, because of this we have been directed to follow the Central PA process and are no longer able to perform prior authorizations without a scheduled appt.   Sorry for any inconvenience this may cause,     LYN Vaughan   Financial Counselor   [facility]   [address] [place] [address]   [phone]       ----- Message -----  From: Hardeep Arredondo MD  Sent: 8/4/2023   9:55 AM CDT  To: Laura Danielle RN; Lashae Silver LPN; #  Subject: PA for breast reduction                          Please PA for breast reduction  Once done, please let me know

## 2023-08-30 NOTE — TELEPHONE ENCOUNTER
PB DOS: TBD  Type of Procedure: BRM  CPT Codes: 00846  ICD10 Codes: N62  Surgeon/Ordering provider: Hardeep Arredondo MD [id number]  Pre-cert/Authorization completed: approved 2 units   Payer: [facility]  Spoke to Marta MCNALLY 8/30/23  Ref. # [id number] Auth #   Valid Dates: 8/25/23-8/25/24    Please advise the patient to contact their insurance for coverage and benefits. Prior authorization is not a guarantee of payment. Payment is based on the patient's benefit plan documents such as copays, deductibles and out of pocket minimums.

## 2023-09-01 NOTE — TELEPHONE ENCOUNTER
RN noted pt read RN's Watchwith message on 8/30/23. Closing encounter and will reopen when pt reaches out to clinic..Laura Danielle RN     Male

## 2023-09-06 DIAGNOSIS — N62 MACROMASTIA: Primary | ICD-10-CM

## 2023-09-11 ENCOUNTER — TELEPHONE (OUTPATIENT)
Dept: PLASTIC SURGERY | Facility: CLINIC | Age: 38
End: 2023-09-11

## 2023-09-11 PROBLEM — N62 MACROMASTIA: Status: ACTIVE | Noted: 2023-09-06

## 2023-09-11 NOTE — TELEPHONE ENCOUNTER
Reviewed chart.  Pt scheduled for surgery and post op per .  Closing encounter.      Georgina Nolasco RN

## 2023-09-20 NOTE — PROGRESS NOTES
DISCHARGE SUMMARY    Minerva Cardona was seen   times for evaluation and treatment.  Patient did not return for further treatment and current status is unknown.  Due to short treatment duration, no objective or functional changes were made.  Please see goal flow sheet from episode noted date below and initial evaluation for further information.  Patient is discharged from therapy and therapy episode is resolved as of 09/20/23.      Linked Episodes   Type: Episode: Status: Noted: Resolved: Last update: Updated by:   PHYSICAL THERAPY kaylee knee Active 7/19/2023 8/7/2023 10:46 AM Kofi Hernandez, PT      Comments:

## 2023-10-11 DIAGNOSIS — R41.840 ATTENTION DEFICIT: ICD-10-CM

## 2023-10-12 RX ORDER — BUPROPION HYDROCHLORIDE 150 MG/1
150 TABLET ORAL EVERY MORNING
Qty: 90 TABLET | Refills: 0 | Status: SHIPPED | OUTPATIENT
Start: 2023-10-12 | End: 2023-10-30

## 2023-10-30 ENCOUNTER — VIRTUAL VISIT (OUTPATIENT)
Dept: PEDIATRICS | Facility: CLINIC | Age: 38
End: 2023-10-30
Payer: COMMERCIAL

## 2023-10-30 DIAGNOSIS — M25.20 JOINT LAXITY: ICD-10-CM

## 2023-10-30 DIAGNOSIS — R41.840 ATTENTION DEFICIT: ICD-10-CM

## 2023-10-30 DIAGNOSIS — F41.9 ANXIETY: Primary | ICD-10-CM

## 2023-10-30 PROCEDURE — 99214 OFFICE O/P EST MOD 30 MIN: CPT | Mod: VID | Performed by: INTERNAL MEDICINE

## 2023-10-30 RX ORDER — BUPROPION HYDROCHLORIDE 300 MG/1
300 TABLET ORAL EVERY MORNING
Qty: 90 TABLET | Refills: 0 | Status: SHIPPED | OUTPATIENT
Start: 2023-10-30 | End: 2023-12-29

## 2023-10-30 NOTE — PATIENT INSTRUCTIONS
Mental Health  - Let's try increasing the wellbutrin to 1300 mg daily (can take two of your 150 XLs at home until gone). New script at pharmacy.   - If this doesn't help w/ the attention and only partially helps w/ the anxiety we may try a different medication that more targets anxiety (?ssri)    Finger  - Check to see if [facility] offers ergonomic assessments. I'm happy to write a letter of support for this. Can try a finger brace to wear at work (12 hours on 12 hours off).     After surgery...  - Let's not forget about the shoulders, SI joints, etc-> may be worth meeting with Sports Med. The treatment will likely be physical therapy, gym, etc but may be helpful to just have some kind of an ansser.

## 2023-10-30 NOTE — PROGRESS NOTES
Minerva is a 38 year old who is being evaluated via a billable video visit.      Assessment & Plan       ICD-10-CM    1. Anxiety  F41.9       2. Attention deficit  R41.840 buPROPion (WELLBUTRIN XL) 300 MG 24 hr tablet      3. Joint laxity  M25.20         --------------------------  PATIENT INSTRUCTIONS    Patient Instructions   Mental Health  - Let's try increasing the wellbutrin to 1300 mg daily (can take two of your 150 XLs at home until gone). New script at pharmacy.   - If this doesn't help w/ the attention and only partially helps w/ the anxiety we may try a different medication that more targets anxiety (?ssri)    Finger  - Check to see if [facility] offers ergonomic assessments. I'm happy to write a letter of support for this. Can try a finger brace to wear at work (12 hours on 12 hours off).     After surgery...  - Let's not forget about the shoulders, SI joints, etc-> may be worth meeting with Sports Med. The treatment will likely be physical therapy, gym, etc but may be helpful to just have some kind of an ansser.     --------------------------                   Jeromy Hernandez MD  [facility] [place] is a 38 year old, presenting for the following health issues:  No chief complaint on file.      History of Present Illness       Reason for visit:  Pain, med check    She eats 2-3 servings of fruits and vegetables daily.She consumes 0 sweetened beverage(s) daily.She exercises with enough effort to increase her heart rate 20 to 29 minutes per day.  She exercises with enough effort to increase her heart rate 6 days per week.   She is taking medications regularly.       Last OV 7/2923  I appreciate the very reasonable assessment of the Wellbutrin. Let's give it 3-4 more months to see how it does with your regular life. May talk about increasing to 300 but let's see.      Let me think about the joints more  - to start let's work on strengthening them and the  surrounding muscles and see if it helps the laxity/pain  - they will call you to schedule     Left ear - debrox [facility]      Plastic Surgery will call you to juan l[facility]          Planning for reduction in March   Got a mammo call back     Don't think the wellbutrin has done a lot  Does think it's done a little bit - maybe some in the anxiety department.     Tracked symptoms  Did physical therapy - mostly focused on one system  The one that she notices the most - Uses menstrual cup - noticed that it feels weird when she tries to put in the menstrual cup.   Sometimes after sex it hurts more and sometimes it doesn't.     Fingertips hurt at the end of the day - pushing enter on the keyboard.     Sweat really itchy  Cries really hernandez    SI joint - cracks. Sometimes hurts. Sometimes feels like it's out of place and has  to crack it to get it into place.             Review of Systems         Objective           Vitals:  No vitals were obtained today due to virtual visit.    Physical Exam   GENERAL: Healthy, alert and no distress  EYES: Eyes grossly normal to inspection.  No discharge or erythema, or obvious scleral/conjunctival abnormalities.  RESP: No audible wheeze, cough, or visible cyanosis.  No visible retractions or increased work of breathing.    SKIN: Visible skin clear. No significant rash, abnormal pigmentation or lesions.  NEURO: Cranial nerves grossly intact.  Mentation and speech appropriate for age.  PSYCH: Mentation appears normal, affect normal/bright, judgement and insight intact, normal speech and appearance well-groomed.                Video-Visit Details    Type of service:  Video Visit     Originating Location (pt. Location): Home    Distant Location (provider location):  On-site  Platform used for Video Visit: [employer]

## 2023-12-13 ENCOUNTER — IMMUNIZATION (OUTPATIENT)
Dept: FAMILY MEDICINE | Facility: CLINIC | Age: 38
End: 2023-12-13
Payer: COMMERCIAL

## 2023-12-13 DIAGNOSIS — Z23 NEED FOR PROPHYLACTIC VACCINATION AND INOCULATION AGAINST INFLUENZA: Primary | ICD-10-CM

## 2023-12-13 DIAGNOSIS — Z23 HIGH PRIORITY FOR 2019-NCOV VACCINE: ICD-10-CM

## 2023-12-13 PROCEDURE — 91320 SARSCV2 VAC 30MCG TRS-SUC IM: CPT

## 2023-12-13 PROCEDURE — 99207 PR NO CHARGE NURSE ONLY: CPT

## 2023-12-13 PROCEDURE — 90686 IIV4 VACC NO PRSV 0.5 ML IM: CPT

## 2023-12-13 PROCEDURE — 90471 IMMUNIZATION ADMIN: CPT

## 2023-12-13 PROCEDURE — 90480 ADMN SARSCOV2 VAC 1/ONLY CMP: CPT

## 2023-12-21 NOTE — TELEPHONE ENCOUNTER
Left voicemail for patient regarding rescheduling surgery with Dr. Arredondo due to resident interviews on 3/20    Writer was able to close clinic on 3/22 and reschedule surgery to this date. No changes to appointments.    Requested patient call back confirming the change in surgery date to 3/22.    Provided direct contact number to discuss.    P: [phone]    __    Kera Curry, Senior Perioperative Coordinator, on 12/21/2023 at 10:31 AM

## 2023-12-28 NOTE — PROGRESS NOTES
Minerva is a 38 year old who is being evaluated via a billable video visit.        ICD-10-CM    1. Attention deficit  R41.840         Overall some improvement with increasing to 300 mg - still w some attention issues. For now will keep at this dose and monitor - she has an upcoming surgery.     Joint issues have actually been quieter w/ more mild temps.    --------------------------  PATIENT INSTRUCTIONS    Patient Instructions   Mammogram  Call [facility] [phone] -> ask to be transferred to Radiology -> then there is a telephone tree and [facility] should be one of the options.   Let me know if you can't figure out it out    We'll schedule your preop with me.     Keeping wellbutrin the same for now - let's see how the next 6 mo or so go. There is one additional dose adjustment we can make if needed. I'm glad the side effects got better!    --------------------------      Subjective   Minerva is a 38 year old, presenting for the following health issues:  No chief complaint on file.    HPI     Last VV 10/2023  Mental Health  - Let's try increasing the wellbutrin to 1300 mg daily (can take two of your 150 XLs at home until gone). New script at pharmacy.   - If this doesn't help w/ the attention and only partially helps w/ the anxiety we may try a different medication that more targets anxiety (?ssri)     Finger  - Check to see if [facility] offers ergonomic assessments. I'm happy to write a letter of support for this. Can try a finger brace to wear at work (12 hours on 12 hours off).      After surgery...  - Let's not forget about the shoulders, SI joints, etc-> may be worth meeting with Sports Med. The treatment will likely be physical therapy, gym, etc but may be helpful to just have some kind of an ansser.     At first had a lot of trouble sleeping, felt like the side effects were bad. Lasted almost a month. Would wake up at 3am and then couldn't sleep. Changed when she takes the medication - made a  huge difference taking it at night.     Less anxiety - more being able to focus on things. No ADHD diagnosis (has tee assessed).   Doesn't feel like it's perfect but it is helpful.    Joint pains not as bad bc winter has been mild. Worse with extreme temps.     Review of Systems         Objective           Vitals:  No vitals were obtained today due to virtual visit.    Physical Exam   GENERAL: Healthy, alert and no distress  EYES: Eyes grossly normal to inspection.  No discharge or erythema, or obvious scleral/conjunctival abnormalities.  RESP: No audible wheeze, cough, or visible cyanosis.  No visible retractions or increased work of breathing.    SKIN: Visible skin clear. No significant rash, abnormal pigmentation or lesions.  NEURO: Cranial nerves grossly intact.  Mentation and speech appropriate for age.  PSYCH: Mentation appears normal, affect normal/bright, judgement and insight intact, normal speech and appearance well-groomed.        Video-Visit Details    Type of service:  Video Visit     Originating Location (pt. Location): Home    Distant Location (provider location):  On-site  Platform used for Video Visit: [employer]

## 2023-12-29 ENCOUNTER — VIRTUAL VISIT (OUTPATIENT)
Dept: PEDIATRICS | Facility: CLINIC | Age: 38
End: 2023-12-29
Payer: COMMERCIAL

## 2023-12-29 DIAGNOSIS — R41.840 ATTENTION DEFICIT: ICD-10-CM

## 2023-12-29 PROCEDURE — 99213 OFFICE O/P EST LOW 20 MIN: CPT | Mod: VID | Performed by: INTERNAL MEDICINE

## 2023-12-29 RX ORDER — BUPROPION HYDROCHLORIDE 300 MG/1
300 TABLET ORAL EVERY MORNING
Qty: 90 TABLET | Refills: 2 | Status: SHIPPED | OUTPATIENT
Start: 2023-12-29 | End: 2024-05-13

## 2023-12-29 NOTE — PATIENT INSTRUCTIONS
Mammogram  Call [facility] [phone] -> ask to be transferred to Radiology -> then there is a telephone tree and the [facility] should be one of the options.   Let me know if you can't figure out it out    We'll schedule your preop with me.     Keeping wellbutrin the same for now - let's see how the next 6 mo or so go. There is one additional dose adjustment we can make if needed. I'm glad the side effects got better!

## 2024-02-23 ENCOUNTER — HOSPITAL ENCOUNTER (OUTPATIENT)
Dept: MAMMOGRAPHY | Facility: CLINIC | Age: 39
Discharge: HOME | End: 2024-02-23
Attending: STUDENT IN AN ORGANIZED HEALTH CARE EDUCATION/TRAINING PROGRAM | Admitting: STUDENT IN AN ORGANIZED HEALTH CARE EDUCATION/TRAINING PROGRAM
Payer: COMMERCIAL

## 2024-02-23 DIAGNOSIS — R92.8 CATEGORY 3 MAMMOGRAPHY RESULT WITH SHORT FOLLOW-UP INTERVAL SUGGESTED FOR PROBABLY BENIGN FINDING: ICD-10-CM

## 2024-02-23 PROCEDURE — 77061 BREAST TOMOSYNTHESIS UNI: CPT | Mod: LT

## 2024-03-08 ENCOUNTER — CARE COORDINATION (OUTPATIENT)
Dept: SURGERY | Facility: CLINIC | Age: 39
End: 2024-03-08
Payer: COMMERCIAL

## 2024-03-11 ENCOUNTER — OFFICE VISIT (OUTPATIENT)
Dept: PEDIATRICS | Facility: CLINIC | Age: 39
End: 2024-03-11
Payer: COMMERCIAL

## 2024-03-11 VITALS
HEART RATE: 71 BPM | HEIGHT: 65 IN | WEIGHT: 182.5 LBS | BODY MASS INDEX: 30.41 KG/M2 | TEMPERATURE: 97.7 F | OXYGEN SATURATION: 100 % | RESPIRATION RATE: 18 BRPM | DIASTOLIC BLOOD PRESSURE: 68 MMHG | SYSTOLIC BLOOD PRESSURE: 134 MMHG

## 2024-03-11 DIAGNOSIS — N62 MACROMASTIA: ICD-10-CM

## 2024-03-11 DIAGNOSIS — D64.9 NORMOCYTIC ANEMIA: ICD-10-CM

## 2024-03-11 DIAGNOSIS — Z01.818 PREOP GENERAL PHYSICAL EXAM: Primary | ICD-10-CM

## 2024-03-11 PROBLEM — R79.0 LOW IRON STORES: Status: ACTIVE | Noted: 2024-03-11

## 2024-03-11 LAB
ERYTHROCYTE [DISTWIDTH] IN BLOOD BY AUTOMATED COUNT: 14.1 % (ref 10–15)
HCT VFR BLD AUTO: 34.5 % (ref 35–47)
HGB BLD-MCNC: 10.7 G/DL (ref 11.7–15.7)
MCH RBC QN AUTO: 27.2 PG (ref 26.5–33)
MCHC RBC AUTO-ENTMCNC: 31 G/DL (ref 31.5–36.5)
MCV RBC AUTO: 88 FL (ref 78–100)
PLATELET # BLD AUTO: 229 10E3/UL (ref 150–450)
RBC # BLD AUTO: 3.93 10E6/UL (ref 3.8–5.2)
WBC # BLD AUTO: 3.6 10E3/UL (ref 4–11)

## 2024-03-11 PROCEDURE — 36415 COLL VENOUS BLD VENIPUNCTURE: CPT | Performed by: INTERNAL MEDICINE

## 2024-03-11 PROCEDURE — 99214 OFFICE O/P EST MOD 30 MIN: CPT | Performed by: INTERNAL MEDICINE

## 2024-03-11 PROCEDURE — 85027 COMPLETE CBC AUTOMATED: CPT | Performed by: INTERNAL MEDICINE

## 2024-03-11 NOTE — PROGRESS NOTES
Preoperative Evaluation  [facility] [place]  [address]  [address]  [place] [address]  Phone: [phone]  Fax: [phone]  Primary Provider: Deonna Clark  Pre-op Performing Provider: DEONNA CLARK  Mar 11, 2024       Minevra is a 38 year old, presenting for the following:  Pre-Op Exam        3/11/2024     3:42 PM   Additional Questions   Roomed by miss   Accompanied by self         3/11/2024     3:42 PM   Patient Reported Additional Medications   Patient reports taking the following new medications no     Surgical Information  Surgery/Procedure: breast reduction  Surgery Location: [facility]  Surgeon: Dr Cohen  Surgery Date: 3/22/2024  Time of Surgery: TBD  Where patient plans to recover: At home with family  Fax number for surgical facility: Note does not need to be faxed, will be available electronically in Epic.    Assessment & Plan     The proposed surgical procedure is considered INTERMEDIATE risk.      ICD-10-CM    1. Preop general physical exam  Z01.818 CBC with platelets     CBC with platelets      2. Macromastia  N62         Discussed iron supplement and/or Mirena IUD for menorrhagia.      - No identified additional risk factors other than previously addressed    Antiplatelet or Anticoagulation Medication Instructions   - Patient is on no antiplatelet or anticoagulation medications.    Additional Medication Instructions   - ibuprofen (Advil, Motrin): HOLD 1 day before surgery.     Recommendation  APPROVAL GIVEN to proceed with proposed procedure pending review of diagnostic evaluation.    ----------  ADDENDUM:   Nomocytic anemia - based on prior labs suspect iron deficiency due to menorrhagia.     -----------------  See Results Note: Braeden Palma,     It was great to see you. Your hemoglobin is a bit low - I am fine with proceeding with your surgery but I do want you to to take the iron supplement. Let's recheck this in 2-3 months  - [facility]     SANTOS Hernandez MD  Internal Medicine-Pediatrics  --------------    Will FYI surgeon but I still think she is okay for surgery.       Subjective       HPI related to upcoming procedure: Breast reduction          3/11/2024     3:43 PM   Preop Questions   1. Have you ever had a heart attack or stroke? No   2. Have you ever had surgery on your heart or blood vessels, such as a stent placement, a coronary artery bypass, or surgery on an artery in your head, neck, heart, or legs? No   3. Do you have chest pain with activity? No   4. Do you have a history of  heart failure? No   5. Do you currently have a cold, bronchitis or symptoms of other infection? No   6. Do you have a cough, shortness of breath, or wheezing? No   7. Do you or anyone in your family have previous history of blood clots? No   8. Do you or does anyone in your family have a serious bleeding problem such as prolonged bleeding following surgeries or cuts? UNKNOWN - no one she knows of.    9. Have you ever had problems with anemia or been told to take iron pills? YES - On and off   10. Have you had any abnormal blood loss such as black, tarry or bloody stools, or abnormal vaginal bleeding? YES - heavy periods    11. Have you ever had a blood transfusion? No   12. Are you willing to have a blood transfusion if it is medically needed before, during, or after your surgery? Yes   13. Have you or any of your relatives ever had problems with anesthesia? UNKNOWN - No that she knows of.    14. Do you have sleep apnea, excessive snoring or daytime drowsiness? No   15. Do you have any artifical heart valves or other implanted medical devices like a pacemaker, defibrillator, or continuous glucose monitor? No   16. Do you have artificial joints? No   17. Are you allergic to latex? No   18. Is there any chance that you may be pregnant? No     Health Care Directive  Patient does not have a Health Care Directive  or Living Will: Discussed advance care planning with patient; however, patient declined at this time.    Preoperative Review of    reviewed - no record of controlled substances prescribed.          Patient Active Problem List    Diagnosis Date Noted    Anxiety 10/30/2023     Priority: Medium    Macromastia 09/06/2023     Priority: Medium    Chronic pain of both knees 07/19/2023     Priority: Medium    Joint laxity 07/19/2023     Priority: Medium    Cervical high risk HPV (human papillomavirus) test positive 06/14/2019     Priority: Medium     6/14/19 NIL, +HR HPV, not 1618. Plan 1 yr co-test    02/4/21 Lost to follow-up for pap tracking, fyi routed to provider  5/6/2021 NIL pap, neg HPV. Plan cotest in 1 year.  11/18/22 NIL Pap, Neg HPV. Plan cotest in 3 years.         CARDIOVASCULAR SCREENING; LDL GOAL LESS THAN 160 08/16/2011     Priority: Medium      Past Medical History:   Diagnosis Date    Cervical high risk HPV (human papillomavirus) test positive 06/14/2019    Kidney infection     UTI (urinary tract infection)      Past Surgical History:   Procedure Laterality Date    NO HISTORY OF SURGERY       Current Outpatient Medications   Medication Sig Dispense Refill    buPROPion (WELLBUTRIN XL) 300 MG 24 hr tablet Take 1 tablet (300 mg) by mouth every morning 90 tablet 2    famotidine (PEPCID) 20 MG tablet Take 20 mg by mouth 2 times daily      fluticasone (FLONASE) 50 MCG/ACT nasal spray Spray 1 spray into both nostrils daily      ibuprofen (ADVIL/MOTRIN) 200 MG capsule Take 200 mg by mouth every 4 hours as needed for fever      valACYclovir (VALTREX) 1000 mg tablet Take 2 tablets (2,000 mg) by mouth 2 times daily 24 tablet 3       No Known Allergies     Social History     Tobacco Use    Smoking status: Never    Smokeless tobacco: Never   Substance Use Topics    Alcohol use: Yes     Comment: on occasion       History   Drug Use No         Review of Systems      Objective    /68 (BP Location: Right arm,  "Patient Position: Sitting, Cuff Size: Adult Regular)   Pulse 71   Temp 97.7  F (36.5  C) (Tympanic)   Resp 18   Ht 1.653 m (5' 5.08\")   Wt 82.8 kg (182 lb 8 oz)   LMP 03/09/2024   SpO2 100%   BMI 30.30 kg/m     Estimated body mass index is 30.3 kg/m  as calculated from the following:    Height as of this encounter: 1.653 m (5' 5.08\").    Weight as of this encounter: 82.8 kg (182 lb 8 oz).  Physical Exam  GENERAL: alert and no distress  EYES: Eyes grossly normal to inspection, PERRL and conjunctivae and sclerae normal  HENT: ear canals and TM's normal, nose and mouth without ulcers or lesions  NECK: no adenopathy, no asymmetry, masses, or scars  RESP: lungs clear to auscultation - no rales, rhonchi or wheezes  CV: regular rate and rhythm, normal S1 S2, no S3 or S4, no murmur, click or rub, no peripheral edema  ABDOMEN: soft, nontender, no hepatosplenomegaly, no masses and bowel sounds normal  MS: no gross musculoskeletal defects noted, no edema  SKIN: no suspicious lesions or rashes  NEURO: Normal strength and tone, mentation intact and speech normal  PSYCH: mentation appears normal, affect normal/bright    Recent Labs   Lab Test 05/01/23  1142 08/08/22  1359   HGB 11.8 13.0    241    139   POTASSIUM 4.8 3.9   CR 0.83 0.78        Diagnostics  Labs pending at this time.  Results will be reviewed when available.   No EKG required, no history of coronary heart disease, significant arrhythmia, peripheral arterial disease or other structural heart disease.    Revised Cardiac Risk Index (RCRI)  The patient has the following serious cardiovascular risks for perioperative complications:   - No serious cardiac risks = 0 points     RCRI Interpretation: 0 points: Class I (very low risk - 0.4% complication rate)       Signed Electronically by: Jeromy Hernandez MD  Copy of this evaluation report is provided to requesting physician.         "

## 2024-03-11 NOTE — PATIENT INSTRUCTIONS
Preparing for Your Surgery  Getting started  A nurse will call you to review your health history and instructions. They will give you an arrival time based on your scheduled surgery time. Please be ready to share:  Your doctor's clinic name and phone number  Your medical, surgical, and anesthesia history  A list of allergies and sensitivities  A list of medicines, including herbal treatments and over-the-counter drugs  Whether the patient has a legal guardian (ask how to send us the papers in advance)  Please tell us if you're pregnant--or if there's any chance you might be pregnant. Some surgeries may injure a fetus (unborn baby), so they require a pregnancy test. Surgeries that are safe for a fetus don't always need a test, and you can choose whether to have one.   If you have a child who's having surgery, please ask for a copy of Preparing for Your Child's Surgery.    Preparing for surgery  Within 10 to 30 days of surgery: Have a pre-op exam (sometimes called an H&P, or History and Physical). This can be done at a clinic or pre-operative center.  If you're having a , you may not need this exam. Talk to your care team.  At your pre-op exam, talk to your care team about all medicines you take. If you need to stop any medicines before surgery, ask when to start taking them again.  We do this for your safety. Many medicines can make you bleed too much during surgery. Some change how well surgery (anesthesia) drugs work.  Call your insurance company to let them know you're having surgery. (If you don't have insurance, call [phone].)  Call your clinic if there's any change in your health. This includes signs of a cold or flu (sore throat, runny nose, cough, rash, fever). It also includes a scrape or scratch near the surgery site.  If you have questions on the day of surgery, call your hospital or surgery center.  Eating and drinking guidelines  For your safety: Unless your surgeon tells you otherwise,  follow the guidelines below.  Eat and drink as usual until 8 hours before you arrive for surgery. After that, no food or milk.  Drink clear liquids until 2 hours before you arrive. These are liquids you can see through, like water, Gatorade, and Propel Water. They also include plain black coffee and tea (no cream or milk), candy, and breath mints. You can spit out gum when you arrive.  If you drink alcohol: Stop drinking it the night before surgery.  If your care team tells you to take medicine on the morning of surgery, it's okay to take it with a sip of water.  Preventing infection  Shower or bathe the night before and morning of your surgery. Follow the instructions your clinic gave you. (If no instructions, use regular soap.)  Don't shave or clip hair near your surgery site. We'll remove the hair if needed.  Don't smoke or vape the morning of surgery. You may chew nicotine gum up to 2 hours before surgery. A nicotine patch is okay.  Note: Some surgeries require you to completely quit smoking and nicotine. Check with your surgeon.  Your care team will make every effort to keep you safe from infection. We will:  Clean our hands often with soap and water (or an alcohol-based hand rub).  Clean the skin at your surgery site with a special soap that kills germs.  Give you a special gown to keep you warm. (Cold raises the risk of infection.)  Wear special hair covers, masks, gowns and gloves during surgery.  Give antibiotic medicine, if prescribed. Not all surgeries need antibiotics.  What to bring on the day of surgery  Photo ID and insurance card  Copy of your health care directive, if you have one  Glasses and hearing aids (bring cases)  You can't wear contacts during surgery  Inhaler and eye drops, if you use them (tell us about these when you arrive)  CPAP machine or breathing device, if you use them  A few personal items, if spending the night  If you have . . .  A pacemaker, ICD (cardiac defibrillator) or other  implant: Bring the ID card.  An implanted stimulator: Bring the remote control.  A legal guardian: Bring a copy of the certified (court-stamped) guardianship papers.  Please remove any jewelry, including body piercings. Leave jewelry and other valuables at home.  If you're going home the day of surgery  You must have a responsible adult drive you home. They should stay with you overnight as well.  If you don't have someone to stay with you, and you aren't safe to go home alone, we may keep you overnight. Insurance often won't pay for this.  After surgery  If it's hard to control your pain or you need more pain medicine, please call your surgeon's office.  Questions?   If you have any questions for your care team, list them here: _________________________________________________________________________________________________________________________________________________________________________ ____________________________________ ____________________________________ ____________________________________  For informational purposes only. Not to replace the advice of your health care provider. Copyright   2003, 2019 [facility]. All rights reserved. Clinically reviewed by Silvia Zambrano MD. [employer] [id number] - REV 12/22.

## 2024-03-21 ENCOUNTER — ANESTHESIA EVENT (OUTPATIENT)
Dept: SURGERY | Facility: AMBULATORY SURGERY CENTER | Age: 39
End: 2024-03-21
Payer: COMMERCIAL

## 2024-03-21 NOTE — ANESTHESIA PREPROCEDURE EVALUATION
Anesthesia Pre-Procedure Evaluation    Patient: Minerva Cardona   MRN: [id number] : 1985        Procedure : Procedure(s):  BILATERAL REDUCTION MAMMAPLASTY          Past Medical History:   Diagnosis Date    Cervical high risk HPV (human papillomavirus) test positive 2019    Kidney infection     UTI (urinary tract infection)       Past Surgical History:   Procedure Laterality Date    NO HISTORY OF SURGERY        No Known Allergies   Social History     Tobacco Use    Smoking status: Never    Smokeless tobacco: Never   Substance Use Topics    Alcohol use: Yes     Comment: on occasion      Wt Readings from Last 1 Encounters:   24 82.8 kg (182 lb 8 oz)        Anesthesia Evaluation   Pt has not had prior anesthetic         ROS/MED HX  ENT/Pulmonary:  - neg pulmonary ROS     Neurologic:  - neg neurologic ROS     Cardiovascular:  - neg cardiovascular ROS  (-) murmur   METS/Exercise Tolerance: >4 METS    Hematologic:  - neg hematologic  ROS     Musculoskeletal:  - neg musculoskeletal ROS     GI/Hepatic:  - neg GI/hepatic ROS     Renal/Genitourinary:  - neg Renal ROS     Endo:  - neg endo ROS     Psychiatric/Substance Use:  - neg psychiatric ROS     Infectious Disease:  - neg infectious disease ROS     Malignancy:  - neg malignancy ROS     Other:  - neg other ROS          Physical Exam    Airway        Mallampati: I   TM distance: > 3 FB   Neck ROM: full   Mouth opening: > 3 cm    Respiratory Devices and Support         Dental     Comment: Teeth examined without any significant abnormality, patient denies loose, missing or chipped teeth or anything removable.         Cardiovascular          Rhythm and rate: regular and normal (-) no murmur    Pulmonary   pulmonary exam normal        breath sounds clear to auscultation           OUTSIDE LABS:  CBC:   Lab Results   Component Value Date    WBC 3.6 (L) 2024    WBC 5.0 2023    HGB 10.7 (L) 2024    HGB 11.8 2023    HCT 34.5 (L)  "03/11/2024    HCT 37.2 05/01/2023     03/11/2024     05/01/2023     BMP:   Lab Results   Component Value Date     05/01/2023     08/08/2022    POTASSIUM 4.8 05/01/2023    POTASSIUM 3.9 08/08/2022    CHLORIDE 104 05/01/2023    CHLORIDE 110 (H) 08/08/2022    CO2 22 05/01/2023    CO2 21 08/08/2022    BUN 12.8 05/01/2023    BUN 11 08/08/2022    CR 0.83 05/01/2023    CR 0.78 08/08/2022    GLC 81 05/01/2023    GLC 80 08/08/2022     COAGS: No results found for: \"PTT\", \"INR\", \"FIBR\"  POC: No results found for: \"BGM\", \"HCG\", \"HCGS\"  HEPATIC:   Lab Results   Component Value Date    ALBUMIN 3.9 08/08/2022    PROTTOTAL 7.0 08/08/2022    ALT 26 08/08/2022    AST 14 08/08/2022    ALKPHOS 44 08/08/2022    BILITOTAL 0.4 08/08/2022     OTHER:   Lab Results   Component Value Date    BEVERLEY 9.6 05/01/2023    LIPASE 112 10/26/2018    TSH 0.90 05/01/2023    SED 8 08/08/2022       Anesthesia Plan    ASA Status:  2    NPO Status:  NPO Appropriate    Anesthesia Type: General.     - Airway: LMA   Induction: Intravenous, Propofol.   Maintenance: Balanced.        Consents    Anesthesia Plan(s) and associated risks, benefits, and realistic alternatives discussed. Questions answered and patient/representative(s) expressed understanding.     - Discussed:     - Discussed with:  Patient      - Extended Intubation/Ventilatory Support Discussed: No.      - Patient is DNR/DNI Status: No     Use of blood products discussed: No .     Postoperative Care    Pain management: IV analgesics, Oral pain medications, Multi-modal analgesia.   PONV prophylaxis: Ondansetron (or other 5HT-3), Dexamethasone or Solumedrol, Background Propofol Infusion, Scopolamine patch     Comments:    Other Comments: Discussed plan for general anesthetic with LMA. Discussed risks of sore throat, post op pain/nausea, oropharyngeal damage, rare major complications.             Osbaldo Perrin MD    I have reviewed the pertinent notes and labs in the chart " "from the past 30 days and (re)examined the patient.  Any updates or changes from those notes are reflected in this note.              # Obesity: Estimated body mass index is 30.3 kg/m  as calculated from the following:    Height as of 3/11/24: 1.653 m (5' 5.08\").    Weight as of 3/11/24: 82.8 kg (182 lb 8 oz).      "

## 2024-03-21 NOTE — PROGRESS NOTES
Routing message received from Dr. Arredondo:  ok to proceed with surgery tomorrow.    Georgina Nolasco RN

## 2024-03-22 ENCOUNTER — ANESTHESIA (OUTPATIENT)
Dept: SURGERY | Facility: AMBULATORY SURGERY CENTER | Age: 39
End: 2024-03-22
Payer: COMMERCIAL

## 2024-03-22 ENCOUNTER — HOSPITAL ENCOUNTER (OUTPATIENT)
Facility: AMBULATORY SURGERY CENTER | Age: 39
Discharge: HOME | End: 2024-03-22
Attending: STUDENT IN AN ORGANIZED HEALTH CARE EDUCATION/TRAINING PROGRAM | Admitting: STUDENT IN AN ORGANIZED HEALTH CARE EDUCATION/TRAINING PROGRAM
Payer: COMMERCIAL

## 2024-03-22 VITALS
WEIGHT: 182 LBS | SYSTOLIC BLOOD PRESSURE: 128 MMHG | DIASTOLIC BLOOD PRESSURE: 91 MMHG | RESPIRATION RATE: 8 BRPM | BODY MASS INDEX: 30.21 KG/M2 | OXYGEN SATURATION: 99 % | HEART RATE: 69 BPM | TEMPERATURE: 97.2 F

## 2024-03-22 DIAGNOSIS — N62 MACROMASTIA: Primary | ICD-10-CM

## 2024-03-22 LAB — HCG UR QL: NEGATIVE

## 2024-03-22 PROCEDURE — 19318 BREAST REDUCTION: CPT | Performed by: NURSE ANESTHETIST, CERTIFIED REGISTERED

## 2024-03-22 PROCEDURE — 81025 URINE PREGNANCY TEST: CPT | Performed by: STUDENT IN AN ORGANIZED HEALTH CARE EDUCATION/TRAINING PROGRAM

## 2024-03-22 PROCEDURE — 88305 TISSUE EXAM BY PATHOLOGIST: CPT | Performed by: STUDENT IN AN ORGANIZED HEALTH CARE EDUCATION/TRAINING PROGRAM

## 2024-03-22 PROCEDURE — 19318 BREAST REDUCTION: CPT | Performed by: ANESTHESIOLOGY

## 2024-03-22 PROCEDURE — 19318 BREAST REDUCTION: CPT | Mod: 50 | Performed by: STUDENT IN AN ORGANIZED HEALTH CARE EDUCATION/TRAINING PROGRAM

## 2024-03-22 PROCEDURE — G8916 PT W IV AB GIVEN ON TIME: HCPCS

## 2024-03-22 PROCEDURE — G8907 PT DOC NO EVENTS ON DISCHARG: HCPCS

## 2024-03-22 PROCEDURE — 19318 BREAST REDUCTION: CPT | Mod: LT

## 2024-03-22 RX ORDER — OXYCODONE HYDROCHLORIDE 5 MG/1
5 TABLET ORAL EVERY 4 HOURS PRN
Status: COMPLETED | OUTPATIENT
Start: 2024-03-22 | End: 2024-03-22

## 2024-03-22 RX ORDER — SODIUM CHLORIDE, SODIUM LACTATE, POTASSIUM CHLORIDE, CALCIUM CHLORIDE 600; 310; 30; 20 MG/100ML; MG/100ML; MG/100ML; MG/100ML
INJECTION, SOLUTION INTRAVENOUS CONTINUOUS
Status: DISCONTINUED | OUTPATIENT
Start: 2024-03-22 | End: 2024-03-23 | Stop reason: HOSPADM

## 2024-03-22 RX ORDER — PROPOFOL 10 MG/ML
INJECTION, EMULSION INTRAVENOUS PRN
Status: DISCONTINUED | OUTPATIENT
Start: 2024-03-22 | End: 2024-03-22

## 2024-03-22 RX ORDER — OXYCODONE HYDROCHLORIDE 5 MG/1
5 TABLET ORAL EVERY 6 HOURS PRN
Qty: 12 TABLET | Refills: 0 | Status: SHIPPED | OUTPATIENT
Start: 2024-03-22 | End: 2024-03-25

## 2024-03-22 RX ORDER — SODIUM CHLORIDE, SODIUM LACTATE, POTASSIUM CHLORIDE, CALCIUM CHLORIDE 600; 310; 30; 20 MG/100ML; MG/100ML; MG/100ML; MG/100ML
INJECTION, SOLUTION INTRAVENOUS CONTINUOUS PRN
Status: DISCONTINUED | OUTPATIENT
Start: 2024-03-22 | End: 2024-03-22

## 2024-03-22 RX ORDER — CEFAZOLIN SODIUM 2 G/50ML
2 SOLUTION INTRAVENOUS
Status: COMPLETED | OUTPATIENT
Start: 2024-03-22 | End: 2024-03-22

## 2024-03-22 RX ORDER — METHOCARBAMOL 500 MG/1
500 TABLET, FILM COATED ORAL 4 TIMES DAILY
Qty: 12 TABLET | Refills: 0 | Status: SHIPPED | OUTPATIENT
Start: 2024-03-22 | End: 2024-06-27

## 2024-03-22 RX ORDER — NALOXONE HYDROCHLORIDE 0.4 MG/ML
0.1 INJECTION, SOLUTION INTRAMUSCULAR; INTRAVENOUS; SUBCUTANEOUS
Status: DISCONTINUED | OUTPATIENT
Start: 2024-03-22 | End: 2024-03-23 | Stop reason: HOSPADM

## 2024-03-22 RX ORDER — PROPOFOL 10 MG/ML
INJECTION, EMULSION INTRAVENOUS CONTINUOUS PRN
Status: DISCONTINUED | OUTPATIENT
Start: 2024-03-22 | End: 2024-03-22

## 2024-03-22 RX ORDER — CEFAZOLIN SODIUM 2 G/50ML
2 SOLUTION INTRAVENOUS SEE ADMIN INSTRUCTIONS
Status: DISCONTINUED | OUTPATIENT
Start: 2024-03-22 | End: 2024-03-23 | Stop reason: HOSPADM

## 2024-03-22 RX ORDER — LIDOCAINE 40 MG/G
CREAM TOPICAL
Status: DISCONTINUED | OUTPATIENT
Start: 2024-03-22 | End: 2024-03-23 | Stop reason: HOSPADM

## 2024-03-22 RX ORDER — LIDOCAINE HYDROCHLORIDE 20 MG/ML
INJECTION, SOLUTION INFILTRATION; PERINEURAL PRN
Status: DISCONTINUED | OUTPATIENT
Start: 2024-03-22 | End: 2024-03-22

## 2024-03-22 RX ORDER — FENTANYL CITRATE 50 UG/ML
INJECTION, SOLUTION INTRAMUSCULAR; INTRAVENOUS PRN
Status: DISCONTINUED | OUTPATIENT
Start: 2024-03-22 | End: 2024-03-22

## 2024-03-22 RX ORDER — ACETAMINOPHEN 325 MG/1
975 TABLET ORAL ONCE
Status: COMPLETED | OUTPATIENT
Start: 2024-03-22 | End: 2024-03-22

## 2024-03-22 RX ORDER — ONDANSETRON 2 MG/ML
INJECTION INTRAMUSCULAR; INTRAVENOUS PRN
Status: DISCONTINUED | OUTPATIENT
Start: 2024-03-22 | End: 2024-03-22

## 2024-03-22 RX ORDER — CEPHALEXIN 500 MG/1
500 CAPSULE ORAL 4 TIMES DAILY
Qty: 12 CAPSULE | Refills: 0 | Status: SHIPPED | OUTPATIENT
Start: 2024-03-22 | End: 2024-06-27

## 2024-03-22 RX ORDER — ONDANSETRON 4 MG/1
4 TABLET, ORALLY DISINTEGRATING ORAL EVERY 30 MIN PRN
Status: DISCONTINUED | OUTPATIENT
Start: 2024-03-22 | End: 2024-03-23 | Stop reason: HOSPADM

## 2024-03-22 RX ORDER — ONDANSETRON 2 MG/ML
4 INJECTION INTRAMUSCULAR; INTRAVENOUS EVERY 30 MIN PRN
Status: DISCONTINUED | OUTPATIENT
Start: 2024-03-22 | End: 2024-03-23 | Stop reason: HOSPADM

## 2024-03-22 RX ORDER — FENTANYL CITRATE 50 UG/ML
25 INJECTION, SOLUTION INTRAMUSCULAR; INTRAVENOUS EVERY 5 MIN PRN
Status: DISCONTINUED | OUTPATIENT
Start: 2024-03-22 | End: 2024-03-23 | Stop reason: HOSPADM

## 2024-03-22 RX ORDER — ONDANSETRON 4 MG/1
4 TABLET, FILM COATED ORAL EVERY 6 HOURS PRN
Qty: 12 TABLET | Refills: 0 | Status: SHIPPED | OUTPATIENT
Start: 2024-03-22 | End: 2024-06-27

## 2024-03-22 RX ORDER — BUPIVACAINE HYDROCHLORIDE 2.5 MG/ML
INJECTION, SOLUTION EPIDURAL; INFILTRATION; INTRACAUDAL PRN
Status: DISCONTINUED | OUTPATIENT
Start: 2024-03-22 | End: 2024-03-22 | Stop reason: HOSPADM

## 2024-03-22 RX ORDER — SCOLOPAMINE TRANSDERMAL SYSTEM 1 MG/1
1 PATCH, EXTENDED RELEASE TRANSDERMAL ONCE
Status: DISCONTINUED | OUTPATIENT
Start: 2024-03-22 | End: 2024-03-23 | Stop reason: HOSPADM

## 2024-03-22 RX ORDER — DEXAMETHASONE SODIUM PHOSPHATE 4 MG/ML
INJECTION, SOLUTION INTRA-ARTICULAR; INTRALESIONAL; INTRAMUSCULAR; INTRAVENOUS; SOFT TISSUE PRN
Status: DISCONTINUED | OUTPATIENT
Start: 2024-03-22 | End: 2024-03-22

## 2024-03-22 RX ORDER — FENTANYL CITRATE 50 UG/ML
50 INJECTION, SOLUTION INTRAMUSCULAR; INTRAVENOUS EVERY 5 MIN PRN
Status: DISCONTINUED | OUTPATIENT
Start: 2024-03-22 | End: 2024-03-23 | Stop reason: HOSPADM

## 2024-03-22 RX ADMIN — FENTANYL CITRATE 50 MCG: 50 INJECTION, SOLUTION INTRAMUSCULAR; INTRAVENOUS at 07:23

## 2024-03-22 RX ADMIN — CEFAZOLIN SODIUM 2 G: 2 SOLUTION INTRAVENOUS at 07:15

## 2024-03-22 RX ADMIN — OXYCODONE HYDROCHLORIDE 5 MG: 5 TABLET ORAL at 11:29

## 2024-03-22 RX ADMIN — Medication 0.5 MG: at 09:23

## 2024-03-22 RX ADMIN — FENTANYL CITRATE 25 MCG: 50 INJECTION, SOLUTION INTRAMUSCULAR; INTRAVENOUS at 08:08

## 2024-03-22 RX ADMIN — SODIUM CHLORIDE, SODIUM LACTATE, POTASSIUM CHLORIDE, CALCIUM CHLORIDE: 600; 310; 30; 20 INJECTION, SOLUTION INTRAVENOUS at 06:57

## 2024-03-22 RX ADMIN — FENTANYL CITRATE 25 MCG: 50 INJECTION, SOLUTION INTRAMUSCULAR; INTRAVENOUS at 07:55

## 2024-03-22 RX ADMIN — ACETAMINOPHEN 975 MG: 325 TABLET ORAL at 06:45

## 2024-03-22 RX ADMIN — PROPOFOL 200 MG: 10 INJECTION, EMULSION INTRAVENOUS at 07:23

## 2024-03-22 RX ADMIN — Medication 0.5 MG: at 08:26

## 2024-03-22 RX ADMIN — ONDANSETRON 4 MG: 2 INJECTION INTRAMUSCULAR; INTRAVENOUS at 11:17

## 2024-03-22 RX ADMIN — SCOLOPAMINE TRANSDERMAL SYSTEM 1 PATCH: 1 PATCH, EXTENDED RELEASE TRANSDERMAL at 06:46

## 2024-03-22 RX ADMIN — FENTANYL CITRATE 50 MCG: 50 INJECTION, SOLUTION INTRAMUSCULAR; INTRAVENOUS at 11:17

## 2024-03-22 RX ADMIN — SODIUM CHLORIDE, SODIUM LACTATE, POTASSIUM CHLORIDE, CALCIUM CHLORIDE: 600; 310; 30; 20 INJECTION, SOLUTION INTRAVENOUS at 07:10

## 2024-03-22 RX ADMIN — ONDANSETRON 4 MG: 2 INJECTION INTRAMUSCULAR; INTRAVENOUS at 10:25

## 2024-03-22 RX ADMIN — PROPOFOL 30 MCG/KG/MIN: 10 INJECTION, EMULSION INTRAVENOUS at 07:23

## 2024-03-22 RX ADMIN — SODIUM CHLORIDE, SODIUM LACTATE, POTASSIUM CHLORIDE, CALCIUM CHLORIDE: 600; 310; 30; 20 INJECTION, SOLUTION INTRAVENOUS at 10:08

## 2024-03-22 RX ADMIN — FENTANYL CITRATE 50 MCG: 50 INJECTION, SOLUTION INTRAMUSCULAR; INTRAVENOUS at 11:22

## 2024-03-22 RX ADMIN — DEXAMETHASONE SODIUM PHOSPHATE 8 MG: 4 INJECTION, SOLUTION INTRA-ARTICULAR; INTRALESIONAL; INTRAMUSCULAR; INTRAVENOUS; SOFT TISSUE at 07:32

## 2024-03-22 RX ADMIN — LIDOCAINE HYDROCHLORIDE 60 MG: 20 INJECTION, SOLUTION INFILTRATION; PERINEURAL at 07:23

## 2024-03-22 NOTE — PROGRESS NOTES
PRS    No changes in history or exam. Medically optimized.     OR today for BILATERAL breast reduction.    Discussed the risks of surgery, including but not limited to: infection, bleeding, hematoma, seroma, poor scarring, wound healing issues, pain, nipple sensitivity issues or decreased sensation, loss of nipple areola, need for free nipple grafting, asymmetry, need for revision surgery, suboptimal aesthetic result, anesthesia-related complication, DVT, PE, death.  Despite these risks, patient consents to and would like to proceed with bilateral breast reduction. All questions answered.     Hardeep Arredondo MD, PhD

## 2024-03-22 NOTE — DISCHARGE INSTRUCTIONS
You had 975 mg of Tylenol at 6:45 AM. You may repeat this after 12:45 PM. Maximum amount of Tylenol/Acetaminophen in a 24 hour period is 4,000 mg.      Plastic Surgery Discharge Instructions    Patient has been treated for symptomatic macromastia with Dr. Arredondo on 3/22/2024.     Care: Please wear the supportive surgical bra or gently wrap with an ACE bandage on the underside of the breasts at all times. If you do not have a surgical bra, please consider ordering one online. You can shower in 36-48 hours, but allow the water to run over the incisions and do not pull on the incisions. Do not rub the incisions. No soaking. When you are done showering, gently pad dry and wear the supportive surgical bra at all times, and particularly when ambulating upright. You can loosen supportive bra when lying flat and resting in bed.     Medications: You can take Ibuprofen 400-800 mg and Tylenol 650 mg for pain relief. Please take each every 6 hours, and for optimal pain relief - please stagger the medications so that you are taking one or the other every 3 hours. If you have been prescribed additional pain medications or muscle relaxants, please take as instructed and as needed. If you are taking additional pain medications, please do not exceed 4000 mg of Tylenol daily from all sources. Also, if you are taking narcotic medications, please do not operate heavy machinery or drive. If you have been prescribed antibiotics, please also take as instructed and for the first few days after surgery.     Diet: Start with clear liquids and slow down if nauseated. Advance the diet as tolerated.    Weight-bearing: You can use your arms. No heavy lifting. No strenuous activities. For 2 weeks, do not elevate your heart rate above 100 beats per minute and no lifting greater than 3-5 pounds. After your first clinic visit, we will discuss advancing your activity level to include cardio workout and more lifting.     ER Instructions: Please  call the office and/or consider return to the ER if you experience worsening pain not relieved by medications, increased swelling, redness or high fevers >101F or if there are unexpected problems like shortness of breath.    Post-op follow-up: Clinic in 10-14 days with Dr. Arredondo at the [facility]    Hardeep Arredondo MD, PhD   [facility]  Same-Day Surgery   Adult Discharge Orders & Instructions   For 24 hours after surgery  Get plenty of rest.  A responsible adult must stay with you for at least 24 hours after you leave the hospital.   Do not drive or use heavy equipment.  If you have weakness or tingling, don't drive or use heavy equipment until this feeling goes away.  Do not drink alcohol.  Avoid strenuous or risky activities.  Ask for help when climbing stairs.   You may feel lightheaded.  IF so, sit for a few minutes before standing.  Have someone help you get up.   If you have nausea (feel sick to your stomach): Drink only clear liquids such as apple juice, ginger ale, broth or 7-Up.  Rest may also help.  Be sure to drink enough fluids.  Move to a regular diet as you feel able.  You may have a slight fever. Call the doctor if your fever is over 100 F (37.7 C) (taken under the tongue) or lasts longer than 24 hours.  You may have a dry mouth, a sore throat, muscle aches or trouble sleeping.  These should go away after 24 hours.  Do not make important or legal decisions.   Call your doctor for any of the followin.  Signs of infection (fever, growing tenderness at the surgery site, a large amount of drainage or bleeding, severe pain, foul-smelling drainage, redness, swelling).    2. It has been over 8 to 10 hours since surgery and you are still not able to urinate (pass water).    3.  Headache for over 24 hours.    4.  Numbness, tingling or weakness the day after surgery (if you had spinal anesthesia).  To contact a doctor, call ___________________________

## 2024-03-22 NOTE — ANESTHESIA POSTPROCEDURE EVALUATION
Patient: Minerva Cardona    Procedure: Procedure(s):  BILATERAL REDUCTION MAMMAPLASTY       Anesthesia Type:  General    Note:  Disposition: Outpatient   Postop Pain Control: Uneventful            Sign Out: Well controlled pain   PONV: No   Neuro/Psych: Uneventful            Sign Out: Acceptable/Baseline neuro status   Airway/Respiratory: Uneventful            Sign Out: Acceptable/Baseline resp. status   CV/Hemodynamics: Uneventful            Sign Out: Acceptable CV status; No obvious hypovolemia; No obvious fluid overload   Other NRE:    DID A NON-ROUTINE EVENT OCCUR? No           Last vitals:  Vitals Value Taken Time   /87 03/22/24 1131   Temp 97  F (36.1  C) 03/22/24 1105   Pulse 75 03/22/24 1134   Resp 11 03/22/24 1134   SpO2 94 % 03/22/24 1134   Vitals shown include unfiled device data.    Electronically Signed By: Osbaldo Perrin MD  March 22, 2024  2:42 PM

## 2024-03-22 NOTE — BRIEF OP NOTE
[facility]    Brief Operative Note    Pre-operative diagnosis: Macromastia [N62]  Post-operative diagnosis Same as pre-operative diagnosis    Procedure: BILATERAL REDUCTION MAMMAPLASTY, Bilateral - Breast    Surgeon: Surgeon(s) and Role:     * Hardeep Arredondo MD - Primary  Anesthesia: General   Estimated Blood Loss: 50 mL from 3/22/2024  7:20 AM to 3/22/2024 11:04 AM      Drains: None  Specimens:   ID Type Source Tests Collected by Time Destination   1 : left breast tissue - 759 grams Tissue Breast, Left SURGICAL PATHOLOGY EXAM Hardeep Arredondo MD 3/22/2024  9:40 AM    2 : right breast tissue- 753 grams Tissue Breast, Right SURGICAL PATHOLOGY EXAM Hardeep Arredondo MD 3/22/2024  9:40 AM      Findings:   Bilateral breast reduction. Right breast resected 753g. Left breast resected 759g.  Complications: None.  Implants: * No implants in log *

## 2024-03-22 NOTE — OP NOTE
PLASTIC SURGERY OPERATIVE REPORT     Date of Surgery: 3/22/2024  Surgical Service: Plastic Surgery     Preoperative Diagnosis: Bilateral symptomatic macromastia     Postoperative Diagnosis: Same as preoperative diagnoses     Procedures Performed: Bilateral reduction mammaplasty     Attending:  MANDIE Arredondo  Assistant: None     Complications: None apparent  Specimens: L breast tissue (759 grams), R breast tissue (753 grams)  Implants: None  Estimated blood loss: 50 mL  Wound classification: Clean  Anesthesia: General     Indications for Procedure: 38 year-old female presenting with bilateral symptomatic macromastia, refractory to attempts at conservative management, seeking breast reduction.    Discussed the risks of surgery, including but not limited to: infection, bleeding, hematoma, seroma, poor scarring, wound healing issues, pain, nipple sensitivity issues or decreased sensation, loss of nipple areola, need for free nipple grafting, asymmetry, need for revision surgery, suboptimal aesthetic result, anesthesia-related complication, DVT, PE, death.  Despite these risks, patient consents to and would like to proceed with bilateral breast reduction. All questions answered.      Intraoperative findings: A bilateral inverted T inferior pedicle reduction was planned and done. Both nipple-areola well-perfused following inset.      Description of Procedure: Patient was seen in the preoperative holding area. Consent was verified.  The bilateral breasts were marked.  All additional questions were answered.  The risks of the surgery were reiterated, including (but not limited to): infection, bleeding, pain, poor scarring, hematoma requiring OR evacuation, wound healing issues particularly at the T-point, loss of nipple sensation, loss of nipple pigmentation, loss of nipple entirely or need for free nipple grafting in the operating room, asymmetries, need for revision surgery. Following discussion of all these risks, the  patient again agreed to proceed with surgery.      Patient was then transferred to the operating room and placed supine on the operating table.  All pressure points were padded.  Sequential compression devices were placed on bilateral lower extremities and verified to be operational.  IV antibiotic prophylaxis was given.  Preinduction timeout was performed.  General anesthesia was induced. The breasts were prepped and draped in usual sterile fashion.      The same procedure was done on both sides, starting with the right breast. First, the 42-mm cookie cutter was used to camilla the new areola, and the inferior pedicle was designed with an 8 cm base. Next, a preoperative markings, the perimeter of the inferior pedicle and the new areolar circumference was scored with a #10 blade. The resection markings were then made through dermis. The skin was de-epithelialized from the inferior pedicle. Next, the medial and lateral superior skin flaps were raised at least 2-cm thick and even, taken down to the chest wall. Then, the resection was started medially using monopolar electrocautery, leaving some breast tissue over the chest wall to improve medial breast contour. The resection was then carried over lateral around the inferior pedicle, with care taken to not undermine the pedicle or to incise the pectoralis major fascia. Next, the resection was then taken laterally and the resection was completed. Next, irrigation was used and copious hemostasis was done with monopolar electrocautery. The pedicle was then loosely secured centered to the chest wall with 2-0 Vicryl suture. Next, the lateral aspect of the incision was secured to the chest wall with 3-pointed 2-0 Vicryl suture involving the superficial fascial system. Next, the horizontal and vertical limbs were closed with 2-0 Vicryl suture in the SFS, 3-0 Monocryl deep dermal suture, and 4-0 Monocryl running intracuticular suture. The top of the vertical incision was not  completely closed in lieu of areolar placement once both sides were reduced. The left breast reduction was then done using the same technique as on the right. Once done, the symmetry in volume was checked. Once both breast horizontal and vertical limbs were closed, the patient was sat upright and the new nipple-areola were marked using a 38-mm cookie cutter, centered on the breast mounds and the nipple-midline and sternal notch-new nipple measurements were confirmed to be symmetric. The patient was then laid back supine and the additional new areolar skin resections were done with a #15 blade. Hemostasis was again obtained. The nipple-areola were inset using 4-0 Monocryl deep dermal and running intracuticular suture. All incisions were then dressed with skin adhesive and steri-strips. The breasts were dressed with fluff gauze and a surgical bra was placed. Patient was then transferred to the post-anesthesia care unit      Postoperative plan: Clinic in 2 weeks. No strenuous activities for 2 weeks, including no heart rate elevation above 100 bpm.      Hardeep Arredondo MD, PhD

## 2024-03-22 NOTE — ANESTHESIA PROCEDURE NOTES
Airway       Patient location during procedure: OR  Staff -        CRNA: Elmira Lobo APRN CRNA       Performed By: CRNA  Consent for Airway        Urgency: elective  Indications and Patient Condition       Indications for airway management: colleen-procedural       Induction type:intravenous       Mask difficulty assessment: 0 - not attempted    Final Airway Details       Final airway type: supraglottic airway    Supraglottic Airway Details        Type: LMA       Brand: I-Gel       LMA size: 4    Post intubation assessment        Placement verified by: capnometry, equal breath sounds and chest rise        Number of attempts at approach: 1       Secured with: silk tape       Ease of procedure: easy       Dentition: Intact and Unchanged

## 2024-03-22 NOTE — ANESTHESIA CARE TRANSFER NOTE
Patient: Minerva Cardona    Procedure: Procedure(s):  BILATERAL REDUCTION MAMMAPLASTY       Diagnosis: Macromastia [N62]  Diagnosis Additional Information: No value filed.    Anesthesia Type:   General     Note:    Oropharynx: oropharynx clear of all foreign objects and spontaneously breathing  Level of Consciousness: awake  Oxygen Supplementation: face mask  Level of Supplemental Oxygen (L/min / FiO2): 3  Independent Airway: airway patency satisfactory and stable  Dentition: dentition unchanged  Vital Signs Stable: post-procedure vital signs reviewed and stable  Report to RN Given: handoff report given  Patient transferred to: PACU    Handoff Report: Identifed the Patient, Identified the Reponsible Provider, Reviewed the pertinent medical history, Discussed the surgical course, Reviewed Intra-OP anesthesia mangement and issues during anesthesia, Set expectations for post-procedure period and Allowed opportunity for questions and acknowledgement of understanding  Vitals:  Vitals Value Taken Time   BP     Temp     Pulse     Resp     SpO2 100 % 03/22/24 1107   Vitals shown include unfiled device data.    Electronically Signed By: YARY Mauricio CRNA  March 22, 2024  11:08 AM

## 2024-03-25 LAB
PATH REPORT.COMMENTS IMP SPEC: NORMAL
PATH REPORT.COMMENTS IMP SPEC: NORMAL
PATH REPORT.FINAL DX SPEC: NORMAL
PATH REPORT.GROSS SPEC: NORMAL
PATH REPORT.MICROSCOPIC SPEC OTHER STN: NORMAL
PATH REPORT.RELEVANT HX SPEC: NORMAL
PHOTO IMAGE: NORMAL

## 2024-03-29 ENCOUNTER — OFFICE VISIT (OUTPATIENT)
Dept: SURGERY | Facility: CLINIC | Age: 39
End: 2024-03-29
Payer: COMMERCIAL

## 2024-03-29 DIAGNOSIS — N62 MACROMASTIA: Primary | ICD-10-CM

## 2024-03-29 PROCEDURE — 99024 POSTOP FOLLOW-UP VISIT: CPT | Performed by: STUDENT IN AN ORGANIZED HEALTH CARE EDUCATION/TRAINING PROGRAM

## 2024-03-29 NOTE — LETTER
3/29/2024         RE: Minerva Cardona  [address] [facility] [address]        Dear Colleague,    Thank you for referring your patient, Minerva Cardona, to the [facility]. Please see a copy of my visit note below.    PRS    No issues.  Pain has improved over time.  Has improvement with symptoms since the surgery.    On exam, bilateral breast incisions are well-healing with no wounds or signs of infection.  Slightly more bruising on the left side.  Nipple areola are viable.    Path: Benign breast tissue, no malignancy    A/P: 38-year-old female 2 weeks status post bilateral breast reduction, doing well    -Reiterated activity restrictions, including lifting no greater than 10 pounds and start of light cardio. Can increase weight-lifting to 15-20 pounds during postoperative weeks 5-6.   -Encouraged patient to wear the surgical bra at all times, particularly when upright. When lying flat or lounging, patient does not need to wear the surgical bra or can take holidays from wearing the bra.  -When the steri-strips fall off, patient can start gentle scar treatment with silicone-based ointment or Bio-oil  -No soaking or swimming yet  -Photography today  -Clinic in 4 weeks for re-evaluation    Hardeep Arredondo MD, PhD       Again, thank you for allowing me to participate in the care of your patient.        Sincerely,        Hardeep Arredondo MD

## 2024-03-29 NOTE — PROGRESS NOTES
PRS    No issues.  Pain has improved over time.  Has improvement with symptoms since the surgery.    On exam, bilateral breast incisions are well-healing with no wounds or signs of infection.  Slightly more bruising on the left side.  Nipple areola are viable.    Path: Benign breast tissue, no malignancy    A/P: 38-year-old female 2 weeks status post bilateral breast reduction, doing well    -Reiterated activity restrictions, including lifting no greater than 10 pounds and start of light cardio. Can increase weight-lifting to 15-20 pounds during postoperative weeks 5-6.   -Encouraged patient to wear the surgical bra at all times, particularly when upright. When lying flat or lounging, patient does not need to wear the surgical bra or can take holidays from wearing the bra.  -When the steri-strips fall off, patient can start gentle scar treatment with silicone-based ointment or Bio-oil  -No soaking or swimming yet  -Photography today  -Clinic in 4 weeks for re-evaluation    Hardeep Arredondo MD, PhD

## 2024-03-29 NOTE — NURSING NOTE
Minerva Cardona's chief complaint for this visit includes:  Chief Complaint   Patient presents with    RECHECK     10-14 day post-op: DOS 3/22 Breast reduction     PCP: Jeromy Hernandez    Referring Provider:  Jeromy Hernandez MD  [address] [place] [address]    [facility] 03/09/2024   Data Unavailable      No Known Allergies      Do you need any medication refills at today's visit? No    Mary Ellen guillory Clinic Assistant- Surgical Specialties

## 2024-04-09 ENCOUNTER — TELEPHONE (OUTPATIENT)
Dept: SURGERY | Facility: CLINIC | Age: 39
End: 2024-04-09
Payer: COMMERCIAL

## 2024-04-09 NOTE — TELEPHONE ENCOUNTER
Pt sent a request for an appointment message with a note that states pt has redness and itching to the surgical site. S/P bilateral breast reduction DOS 3/22/24. RN attempted to contact pt by phone but there was no answer. Voice message identifies pt.Left a detailed message with reason for call and to call the RN back at [phone]. Anpro21t message also sent..Laura Danielle RN

## 2024-04-11 NOTE — TELEPHONE ENCOUNTER
"Benjamin Garcia now (4:22 PM)     CE  That's understandable. You are all set to see  tomorrow 4/12/24 at 11:45am in [place] up on the second floor     We will see you soon     Laura     This [employer] message has not been read.     Minerva Alexander27 minutes ago (3:54 PM)     HG  That sounds good. I will take that appointment. I'd rather come in and have it be nothing than the other way around.     Thanks,     Minerva Cardona1 hour ago (3:09 PM)     CE  Thanks for sending in the photos. Does the area feel swollen or warm to the touch? It doesn't look overly concerning but if you would like we can have you come in to see  tomorrow at 11:45am in [place]?      Let me know         Laura Alexander8 hours ago (7:50 AM)     HG  The spot is under my left side and I am just unsure if it's a bruise or something else. It seems to be getting a bit worse though so I wanted to make sure. [id number] is from 2 days ago and [id number] is from last night.      Thank you,  Minerva Myrick   [id number]     [id number]       Benjamin Cardona2 days ago     TIMMY Palma     I tried contacting you by phone but was not able to reach you. We appreciate you reaching out.  If you could send in photos through [employer] of the areas of concern that would be very helpful. When you reply to this message click on the button that says \"attachment\" and select the photos from your mobile device and hit send. I believe you can only send 3 at a time. Our direct department number to call with questions is [phone]        Laura Danielle RNCC  Surgical Oncology, Plastics and General Surgery  [facility]                      Andrey Otoole, RN routed conversation to [facility]; Georgina Nolasco, AMOL2 days ago     Nessa Hernandez  [facility]2 days ago     CS  PT has " appt 4/26 - sending for review of symptoms listed - thank you!    Susie MORRIS [facility]3 days ago     HG  Appointment Request From: Minerva Cardona     With Provider: Hardeep Arredondo [[facility] [place]]     Preferred Date Range: Any     Preferred Times: Any Time     Reason for visit: Request an Appointment     Comments:  Redness and itching at surgical site and on breast tiss

## 2024-04-12 ENCOUNTER — OFFICE VISIT (OUTPATIENT)
Dept: SURGERY | Facility: CLINIC | Age: 39
End: 2024-04-12
Payer: COMMERCIAL

## 2024-04-12 DIAGNOSIS — N62 MACROMASTIA: Primary | ICD-10-CM

## 2024-04-12 PROCEDURE — 99024 POSTOP FOLLOW-UP VISIT: CPT | Performed by: STUDENT IN AN ORGANIZED HEALTH CARE EDUCATION/TRAINING PROGRAM

## 2024-04-12 NOTE — LETTER
4/12/2024         RE: Minerva Cardona  [address] [facility] [address]        Dear Colleague,    Thank you for referring your patient, Minerva Cardona, to the [facility]. Please see a copy of my visit note below.    PRS    Doing well. No issues, except there was a small red area along the L breast that she would like checked out. The redness has improved.     On exam, bilateral breast scars are well-remodeling with no signs of infection. There is a small L breast red area that is non-tender, with no suture abscess apparent.     A/P: 38F 3+ weeks s/p BBR    -Discussed options for additional management. This red area appears to be a self-limiting suture abscess that is in the process of resolving. Instructed patient to continue to observe and to notify us if worsens or does not improve.   -Limit lifting to 10-15 pounds for additional 3 weeks  -No soaking or swimming  -Supportive bra  -Photography today  -Return to clinic in 3-4 weeks    Hardeep Arredondo MD, PhD      Again, thank you for allowing me to participate in the care of your patient.        Sincerely,        Hardeep Arredondo MD

## 2024-04-12 NOTE — NURSING NOTE
Minerva Cardona's chief complaint for this visit includes:  Chief Complaint   Patient presents with    RECHECK     Left breast incision check, Mammo reduction DOS 3/22/24     PCP: Jeromy Hernandez    Referring Provider:  No referring provider defined for this encounter.    LMP 03/09/2024   Data Unavailable      No Known Allergies      Do you need any medication refills at today's visit? No    Mary Ellen guillory Clinic Assistant- Surgical Specialties

## 2024-04-15 NOTE — PROGRESS NOTES
PRS    Doing well. No issues, except there was a small red area along the L breast that she would like checked out. The redness has improved.     On exam, bilateral breast scars are well-remodeling with no signs of infection. There is a small L breast red area that is non-tender, with no suture abscess apparent.     A/P: 38F 3+ weeks s/p BBR    -Discussed options for additional management. This red area appears to be a self-limiting suture abscess that is in the process of resolving. Instructed patient to continue to observe and to notify us if worsens or does not improve.   -Limit lifting to 10-15 pounds for additional 3 weeks  -No soaking or swimming  -Supportive bra  -Photography today  -Return to clinic in 3-4 weeks    Hardeep Arredondo MD, PhD

## 2024-04-26 ENCOUNTER — OFFICE VISIT (OUTPATIENT)
Dept: SURGERY | Facility: CLINIC | Age: 39
End: 2024-04-26
Payer: COMMERCIAL

## 2024-04-26 DIAGNOSIS — N62 MACROMASTIA: Primary | ICD-10-CM

## 2024-04-26 PROCEDURE — 99024 POSTOP FOLLOW-UP VISIT: CPT | Performed by: STUDENT IN AN ORGANIZED HEALTH CARE EDUCATION/TRAINING PROGRAM

## 2024-04-26 RX ORDER — HYDROCORTISONE 2.5 %
CREAM (GRAM) TOPICAL 2 TIMES DAILY
Qty: 30 G | Refills: 0 | Status: SHIPPED | OUTPATIENT
Start: 2024-04-26 | End: 2024-06-27

## 2024-04-26 NOTE — LETTER
4/26/2024         RE: Minerva Cardona  [address] [facility] [address]        Dear Colleague,    Thank you for referring your patient, Minerva Cardona, to the [facility]. Please see a copy of my visit note below.    PRS    Doing well, except she has developed a rash.  She remove the Prineo tape.  She states that it may be slightly worse today.  No topical treatment yet.    On exam, bilateral breast incisions are intact with no wounds or signs of infection.  There are erythematous small papules consistent with irritant contact dermatitis predominantly along the inframammary scar.    A/P: 38-year-old female 4 weeks status post bilateral breast reduction, doing well    -In my opinion, the rash represents an irritant contact dermatitis.  Since it is in the area of the Prineo tape, patient did the right thing to remove the tape as this may have been the inciting material.  I will prescribe hydrocortisone 2.5% cream to be applied topically twice daily as a means to help calm the rash down.  Explained that it may take some time for the rash to fully resolved.  My expectation, however, is that it should resolve with no issues.  -Limit lifting to approximately 15-20 pounds for an additional 2 weeks  -Return to clinic in 2-3 weeks for evaluation.  At that time, patient will be likely cleared for all activities including swimming and soaking.  -Photography today    Hardeep Arredondo MD, PhD      Again, thank you for allowing me to participate in the care of your patient.        Sincerely,        Hardeep Arredondo MD

## 2024-04-26 NOTE — PROGRESS NOTES
PRS    Doing well, except she has developed a rash.  She remove the Prineo tape.  She states that it may be slightly worse today.  No topical treatment yet.    On exam, bilateral breast incisions are intact with no wounds or signs of infection.  There are erythematous small papules consistent with irritant contact dermatitis predominantly along the inframammary scar.    A/P: 38-year-old female 4 weeks status post bilateral breast reduction, doing well    -In my opinion, the rash represents an irritant contact dermatitis.  Since it is in the area of the Prineo tape, patient did the right thing to remove the tape as this may have been the inciting material.  I will prescribe hydrocortisone 2.5% cream to be applied topically twice daily as a means to help calm the rash down.  Explained that it may take some time for the rash to fully resolved.  My expectation, however, is that it should resolve with no issues.  -Limit lifting to approximately 15-20 pounds for an additional 2 weeks  -Return to clinic in 2-3 weeks for evaluation.  At that time, patient will be likely cleared for all activities including swimming and soaking.  -Photography today    Hardeep Arredondo MD, PhD

## 2024-04-26 NOTE — NURSING NOTE
Minerva Cardona's chief complaint for this visit includes:  Chief Complaint   Patient presents with    RECHECK     2nd post-op: DOS 3/22 Breast reduction     PCP: Jeromy Hernandez    Referring Provider:  Jeromy Hernandez MD  [address] [place] [address]    [facility] 03/09/2024   Data Unavailable      No Known Allergies      Do you need any medication refills at today's visit? No     Mary Ellen guillory Clinic Assistant- Surgical Specialties

## 2024-05-10 ENCOUNTER — OFFICE VISIT (OUTPATIENT)
Dept: SURGERY | Facility: CLINIC | Age: 39
End: 2024-05-10
Payer: COMMERCIAL

## 2024-05-10 DIAGNOSIS — N62 MACROMASTIA: Primary | ICD-10-CM

## 2024-05-10 PROCEDURE — 99024 POSTOP FOLLOW-UP VISIT: CPT | Performed by: STUDENT IN AN ORGANIZED HEALTH CARE EDUCATION/TRAINING PROGRAM

## 2024-05-10 NOTE — NURSING NOTE
Minerva Cardona's goals for this visit include:   Chief Complaint   Patient presents with    RECHECK     3rd post-op: DOS 3/22 Breast reduction- rash follow up       She requests these members of her care team be copied on today's visit information:     PCP: Jeromy Hernandez    Referring Provider:  Jeromy Hernandez MD  [address] [address]    [facility] 03/09/2024     Do you need any medication refills at today's visit?     Alma Silver MA on 5/10/2024 at 8:43 AM

## 2024-05-10 NOTE — LETTER
5/10/2024         RE: Minerva Cardona  [address] [facility] [address]        Dear Colleague,    Thank you for referring your patient, Minerva Cardona, to the [facility]. Please see a copy of my visit note below.    PRS    Doing well.  Happy with the result.  The rash has improved.  Patient has not started scar treatment yet.    On exam, bilateral breast incisions are well remodeling with no wounds or signs of infection.  Reasonable symmetry.    A/P: 38-year-old female 6 weeks status post bilateral breast reduction, doing well    -Cleared for all activities, including swimming and lifting with no restrictions  -Patient can initiate scar treatment with silicone based sheeting or bio oil with gentle circular massage.  Focus on areas that are little bit more firm.  She is scarring very well.  -Return to clinic as needed  -Photography    Hardeep Arredondo MD, PhD      Again, thank you for allowing me to participate in the care of your patient.        Sincerely,        Hardeep Arredondo MD

## 2024-05-13 ENCOUNTER — MYC REFILL (OUTPATIENT)
Dept: PEDIATRICS | Facility: CLINIC | Age: 39
End: 2024-05-13
Payer: COMMERCIAL

## 2024-05-13 DIAGNOSIS — R41.840 ATTENTION DEFICIT: ICD-10-CM

## 2024-05-13 DIAGNOSIS — Z86.19 H/O COLD SORES: ICD-10-CM

## 2024-05-13 RX ORDER — BUPROPION HYDROCHLORIDE 300 MG/1
300 TABLET ORAL EVERY MORNING
Qty: 90 TABLET | Refills: 2 | Status: SHIPPED | OUTPATIENT
Start: 2024-05-13 | End: 2024-06-27

## 2024-05-13 RX ORDER — VALACYCLOVIR HYDROCHLORIDE 1 G/1
2000 TABLET, FILM COATED ORAL 2 TIMES DAILY
Qty: 24 TABLET | Refills: 3 | OUTPATIENT
Start: 2024-05-13

## 2024-05-13 RX ORDER — VALACYCLOVIR HYDROCHLORIDE 1 G/1
2000 TABLET, FILM COATED ORAL 2 TIMES DAILY
Qty: 24 TABLET | Refills: 3 | Status: SHIPPED | OUTPATIENT
Start: 2024-05-13

## 2024-06-04 ENCOUNTER — LAB (OUTPATIENT)
Dept: LAB | Facility: CLINIC | Age: 39
End: 2024-06-04
Payer: COMMERCIAL

## 2024-06-04 DIAGNOSIS — D64.9 NORMOCYTIC ANEMIA: ICD-10-CM

## 2024-06-04 LAB
ERYTHROCYTE [DISTWIDTH] IN BLOOD BY AUTOMATED COUNT: 15.2 % (ref 10–15)
HCT VFR BLD AUTO: 37.8 % (ref 35–47)
HGB BLD-MCNC: 12.2 G/DL (ref 11.7–15.7)
MCH RBC QN AUTO: 28.4 PG (ref 26.5–33)
MCHC RBC AUTO-ENTMCNC: 32.3 G/DL (ref 31.5–36.5)
MCV RBC AUTO: 88 FL (ref 78–100)
PLATELET # BLD AUTO: 230 10E3/UL (ref 150–450)
RBC # BLD AUTO: 4.3 10E6/UL (ref 3.8–5.2)
WBC # BLD AUTO: 3.7 10E3/UL (ref 4–11)

## 2024-06-04 PROCEDURE — 85027 COMPLETE CBC AUTOMATED: CPT

## 2024-06-04 PROCEDURE — 82728 ASSAY OF FERRITIN: CPT

## 2024-06-04 PROCEDURE — 36415 COLL VENOUS BLD VENIPUNCTURE: CPT

## 2024-06-07 LAB — FERRITIN SERPL-MCNC: 14 NG/ML (ref 6–175)

## 2024-06-24 SDOH — HEALTH STABILITY: PHYSICAL HEALTH: ON AVERAGE, HOW MANY MINUTES DO YOU ENGAGE IN EXERCISE AT THIS LEVEL?: 20 MIN

## 2024-06-24 SDOH — HEALTH STABILITY: PHYSICAL HEALTH: ON AVERAGE, HOW MANY DAYS PER WEEK DO YOU ENGAGE IN MODERATE TO STRENUOUS EXERCISE (LIKE A BRISK WALK)?: 5 DAYS

## 2024-06-24 ASSESSMENT — SOCIAL DETERMINANTS OF HEALTH (SDOH): HOW OFTEN DO YOU GET TOGETHER WITH FRIENDS OR RELATIVES?: ONCE A WEEK

## 2024-06-27 ENCOUNTER — TRANSFERRED RECORDS (OUTPATIENT)
Dept: HEALTH INFORMATION MANAGEMENT | Facility: CLINIC | Age: 39
End: 2024-06-27

## 2024-06-27 ENCOUNTER — OFFICE VISIT (OUTPATIENT)
Dept: PEDIATRICS | Facility: CLINIC | Age: 39
End: 2024-06-27
Payer: COMMERCIAL

## 2024-06-27 VITALS
TEMPERATURE: 98.8 F | HEIGHT: 66 IN | HEART RATE: 99 BPM | SYSTOLIC BLOOD PRESSURE: 120 MMHG | WEIGHT: 181 LBS | BODY MASS INDEX: 29.09 KG/M2 | OXYGEN SATURATION: 98 % | DIASTOLIC BLOOD PRESSURE: 80 MMHG | RESPIRATION RATE: 16 BRPM

## 2024-06-27 DIAGNOSIS — R41.840 ATTENTION DEFICIT: ICD-10-CM

## 2024-06-27 DIAGNOSIS — Z00.00 ROUTINE GENERAL MEDICAL EXAMINATION AT A HEALTH CARE FACILITY: Primary | ICD-10-CM

## 2024-06-27 DIAGNOSIS — R79.0 LOW IRON STORES: ICD-10-CM

## 2024-06-27 DIAGNOSIS — N92.0 MENORRHAGIA WITH REGULAR CYCLE: ICD-10-CM

## 2024-06-27 DIAGNOSIS — F41.9 ANXIETY: ICD-10-CM

## 2024-06-27 PROCEDURE — 99214 OFFICE O/P EST MOD 30 MIN: CPT | Mod: 25 | Performed by: INTERNAL MEDICINE

## 2024-06-27 PROCEDURE — 99395 PREV VISIT EST AGE 18-39: CPT | Performed by: INTERNAL MEDICINE

## 2024-06-27 RX ORDER — BUPROPION HYDROCHLORIDE 300 MG/1
300 TABLET ORAL EVERY MORNING
Qty: 90 TABLET | Refills: 3 | Status: SHIPPED | OUTPATIENT
Start: 2024-06-27 | End: 2024-08-07

## 2024-06-27 ASSESSMENT — PAIN SCALES - GENERAL: PAINLEVEL: NO PAIN (0)

## 2024-06-27 NOTE — PROGRESS NOTES
"Preventive Care Visit  [facility] JESENIA Hernandez MD, Internal Medicine - Pediatrics  Jun 27, 2024      Assessment & Plan       ICD-10-CM    1. Routine general medical examination at a health care facility  Z00.00       2. Attention deficit  R41.840 buPROPion (WELLBUTRIN XL) 300 MG 24 hr tablet      3. Anxiety  F41.9       4. Menorrhagia with regular cycle  N92.0 norgestrel-ethinyl estradiol (LO/OVRAL) 0.3-30 MG-MCG tablet     CBC with platelets     Ferritin     Iron and iron binding capacity      5. Low iron stores  R79.0         MH overall in an okay place.   I reread her assessment - symptoms were not interfering in > 1 location however now struggling more w/ work. Will pursue second opinion.     Low iron stores - menorrhagia, longstanding.   Will trial continuous OCP to see if helpful. F/up in 3 months. Ideally will be able to wean off iron supplement.     No family history of blood clots. No personal history of migraine with aura.   Discussed at length risk factors of birth control including blood clots and hypertension. Advised to go to ED if ever develops leg swelling or shortness of breath. Side effects including headaches, nausea, and mood changes were discussed. Reviewed that birth control dose NOT protect again STDs, recommend routine screening with any new partner or symptoms. Discussed that concurrent smoking will further raise risk of blood clots while on contraception.      BMI  Estimated body mass index is 29.21 kg/m  as calculated from the following:    Height as of this encounter: 1.676 m (5' 6\").    Weight as of this encounter: 82.1 kg (181 lb).     Counseling  Appropriate preventive services were discussed with this patient, including applicable screening as appropriate for fall prevention, nutrition, physical activity, Tobacco-use cessation, weight loss and cognition.  Checklist reviewing preventive services available has been given to the patient.  Reviewed " "patient's diet, addressing concerns and/or questions.   She is at risk for psychosocial distress and has been provided with information to reduce risk.   The patient reports drinking more than 3 alcoholic drinks per day and/or more than 7 drhnks per week. The patient was counseled and given information about possible harmful effects of excessive alcohol intake.    Brant Palma is a 38 year old, presenting for the following:  Physical        6/27/2024     8:48 AM   Additional Questions   Roomed by Becca VINSON CMA   Accompanied by Self         6/27/2024     8:48 AM   Patient Reported Additional Medications   Patient reports taking the following new medications n/a        Health Care Directive  Patient does not have a Health Care Directive or Living Will: Discussed advance care planning with patient; however, patient declined at this time.    HPI    # Social  - started a new job, more stress work    # Mental health  - noticing ADHD symptoms   - tried private practice but couldn't get in.   - doesn't feel depressed  - had prior assessment but did \"too well\" and didn't show up in more than one arena.        6/24/2024   General Health   How would you rate your overall physical health? (!) FAIR   Feel stress (tense, anxious, or unable to sleep) Only a little      (!) STRESS CONCERN      6/24/2024   Nutrition   Three or more servings of calcium each day? Yes   Diet: Regular (no restrictions)   How many servings of fruit and vegetables per day? (!) 2-3   How many sweetened beverages each day? 0-1            6/24/2024   Exercise   Days per week of moderate/strenous exercise 5 days   Average minutes spent exercising at this level 20 min            6/24/2024   Social Factors   Frequency of gathering with friends or relatives Once a week   Worry food won't last until get money to buy more No   Food not last or not have enough money for food? No   Do you have housing? (Housing is defined as stable permanent housing and " does not include staying ouside in a car, in a tent, in an abandoned building, in an overnight shelter, or couch-surfing.) Yes   Are you worried about losing your housing? No   Lack of transportation? No   Unable to get utilities (heat,electricity)? No            2024   Dental   Dentist two times every year? Yes            2024   TB Screening   Were you born outside of the [place]? No              Today's PHQ-2 Score:       3/11/2024     3:43 PM   PHQ-2 (  [employer])   Q1: Little interest or pleasure in doing things 0   Q2: Feeling down, depressed or hopeless 0   PHQ-2 Score 0   Q1: Little interest or pleasure in doing things Not at all   Q2: Feeling down, depressed or hopeless Not at all   PHQ-2 Score 0         2024   Substance Use   Alcohol more than 3/day or more than 7/wk Yes   How often do you have a drink containing alcohol 2 to 3 times a week   How many alcohol drinks on typical day 3 or 4   How often do you have 5+ drinks at one occasion Less than monthly   Audit 2/3 Score 2   How often not able to stop drinking once started Never   How often failed to do what normally expected Never   How often needed first drink in am after a heavy drinking session Never   How often feeling of guilt or remorse after drinking Never   How often unable to remember what happened the night before Never   Have you or someone else been injured because of your drinking No   Has anyone been concerned or suggested you cut down on drinking No   TOTAL SCORE - AUDIT 5   Do you use any other substances recreationally? No        Social History     Tobacco Use    Smoking status: Former     Current packs/day: 0.00     Average packs/day: 0.1 packs/day for 1 year (0.1 ttl pk-yrs)     Types: Cigarettes     Start date: 2017     Quit date: 2018     Years since quittin.9     Passive exposure: Past    Smokeless tobacco: Never   Vaping Use    Vaping status: Never Used   Substance Use Topics    Alcohol use: Yes     Comment:  "on occasion    Drug use: Never           2/23/2024   LAST FHS-7 RESULTS   1st degree relative breast or ovarian cancer No           Mammogram Screening - Patient under 40 years of age: Routine Mammogram Screening not recommended.         6/24/2024   STI Screening   New sexual partner(s) since last STI/HIV test? No        History of abnormal Pap smear: No - age 30- 64 PAP with HPV every 5 years recommended        Latest Ref Rng & Units 11/18/2022     3:37 PM 5/6/2021     9:53 AM 5/6/2021     9:25 AM   PAP / HPV   PAP  Negative for Intraepithelial Lesion or Malignancy (NILM)      PAP (Historical)    NIL    HPV 16 DNA Negative Negative  Negative     HPV 18 DNA Negative Negative  Negative     Other HR HPV Negative Negative  Negative             6/24/2024   Contraception/Family Planning   Questions about contraception or family planning (!) YES -            Reviewed and updated as needed this visit by Provider                             Objective    Exam  /80   Pulse 99   Temp 98.8  F (37.1  C) (Tympanic)   Resp 16   Ht 1.676 m (5' 6\")   Wt 82.1 kg (181 lb)   LMP 06/17/2024   SpO2 98%   BMI 29.21 kg/m     Estimated body mass index is 29.21 kg/m  as calculated from the following:    Height as of this encounter: 1.676 m (5' 6\").    Weight as of this encounter: 82.1 kg (181 lb).    Physical Exam  GENERAL: alert and no distress  EYES: Eyes grossly normal to inspection, PERRL and conjunctivae and sclerae normal  HENT: ear canals and TM's normal, nose and mouth without ulcers or lesions  NECK: no adenopathy, no asymmetry, masses, or scars  RESP: lungs clear to auscultation - no rales, rhonchi or wheezes  CV: regular rate and rhythm, normal S1 S2, no S3 or S4, no murmur, click or rub, no peripheral edema  ABDOMEN: soft, nontender, no hepatosplenomegaly, no masses and bowel sounds normal  MS: no gross musculoskeletal defects noted, no edema  SKIN: no suspicious lesions or rashes  NEURO: Normal strength and tone, " mentation intact and speech normal  PSYCH: mentation appears normal, affect normal/bright        Signed Electronically by: Jeromy Hernandez MD

## 2024-06-27 NOTE — PATIENT INSTRUCTIONS
I had one recent patient undergo testing at [email or web address]   [facility]    [email or web address]    Send me an update with what you find.     Let me know if your insurance needs a referral and we can submit it back to them.   -------    Starting birth control pills. Take continuously.   Continue iron supplement for now.     Combination Pills (COCs)    You can start the combination pill at any time.    First Day Start - Take your first pill during the first 24 hours of your menstrual cycle. No back-up contraceptive method is needed when the pill is started the first day of your menses.    Sunday Start - Wait until the first Sunday after your menstrual cycle begins to take your first pill. With this option use another method of birth control for the first 7 days of the first cycle only. The benefit of this is that you shouldn't have a period on the weekend.    Today Start - Start the pill today. If you have had unprotected sexual intercourse since your last period, perform a pregnancy test prior to starting the pill. If it is negative, start the pill today. Use another method of birth control such as condoms or spermicide the first seven days of the first cycle of use.    Irregular vaginal bleeding or spotting may occur while you are taking the pills, especially during the first few months of oral contraceptive use. If you experience spotting or break-through bleeding, (bleeding that occurs outside of the placebo week) that occurs after the first 3 cycles, or lasts for more than a few days, please let me know.        Patient Education   Preventive Care Advice   This is general advice we often give to help people stay healthy. Your care team may have specific advice just for you. Please talk to your care team about your own preventive care needs.  Lifestyle  Exercise at least 150 minutes each week (30 minutes a day, 5 days a week).  Do muscle strengthening activities 2 days a week. These  "help control your weight and prevent disease.  No smoking.  Wear sunscreen to prevent skin cancer.  Have your home tested for radon every 2 to 5 years. Radon is a colorless, odorless gas that can harm your lungs. To learn more, go to www.health.[facility].mn. and search for \"Radon in Homes.\"  Keep guns unloaded and locked up in a safe place like a safe or gun vault, or, use a gun lock and hide the keys. Always lock away bullets separately. To learn more, visit [employer].mn.gov and search for \"safe gun storage.\"  Nutrition  Eat 5 or more servings of fruits and vegetables each day.  Try wheat bread, brown rice and whole grain pasta (instead of white bread, rice, and pasta).  Get enough calcium and vitamin D. Check the label on foods and aim for 100% of the RDA (recommended daily allowance).  Regular exams  Have a dental exam and cleaning every 6 months.  See your health care team every year to talk about:  Any changes in your health.  Any medicines your care team has prescribed.  Preventive care, family planning, and ways to prevent chronic diseases.  Shots (vaccines)   HPV shots (up to age 26), if you've never had them before.  Hepatitis B shots (up to age 59), if you've never had them before.  COVID-19 shot: Get this shot when it's due.  Flu shot: Get a flu shot every year.  Tetanus shot: Get a tetanus shot every 10 years.  Pneumococcal, hepatitis A, and RSV shots: Ask your care team if you need these based on your risk.  Shingles shot (for age 50 and up).  General health tests  Diabetes screening:  Starting at age 35, Get screened for diabetes at least every 3 years.  If you are younger than age 35, ask your care team if you should be screened for diabetes.  Cholesterol test: At age 39, start having a cholesterol test every 5 years, or more often if advised.  Bone density scan (DEXA): At age 50, ask your care team if you should have this scan for osteoporosis (brittle bones).  Hepatitis C: Get tested at least once in your " life.  Abdominal aortic aneurysm screening: Talk to your doctor about having this screening if you:  Have ever smoked; and  Are biologically male; and  Are between the ages of 65 and 75.  STIs (sexually transmitted infections)  Before age 24: Ask your care team if you should be screened for STIs.  After age 24: Get screened for STIs if you're at risk. You are at risk for STIs (including HIV) if:  You are sexually active with more than one person.  You don't use condoms every time.  You or a partner was diagnosed with a sexually transmitted infection.  If you are at risk for HIV, ask about PrEP medicine to prevent HIV.  Get tested for HIV at least once in your life, whether you are at risk for HIV or not.  Cancer screening tests  Cervical cancer screening: If you have a cervix, begin getting regular cervical cancer screening tests at age 21. Most people who have regular screenings with normal results can stop after age 65. Talk about this with your provider.  Breast cancer scan (mammogram): If you've ever had breasts, begin having regular mammograms starting at age 40. This is a scan to check for breast cancer.  Colon cancer screening: It is important to start screening for colon cancer at age 45.  Have a colonoscopy test every 10 years (or more often if you're at risk) Or, ask your provider about stool tests like a FIT test every year or Cologuard test every 3 years.  To learn more about your testing options, visit: www.[facility]/670607.pdf.  For help making a decision, visit: sofia/em73280.  Prostate cancer screening test: If you have a prostate and are age 55 to 69, ask your provider if you would benefit from a yearly prostate cancer screening test.  Lung cancer screening: If you are a current or former smoker age 50 to 80, ask your care team if ongoing lung cancer screenings are right for you.  For informational purposes only. Not to replace the advice of your health care provider. Copyright   2023 [facility]. All rights reserved. Clinically reviewed by the [facility]itions [facility]. [facility] [id number] - REV 04/24.

## 2024-08-07 ENCOUNTER — TELEPHONE (OUTPATIENT)
Dept: PEDIATRICS | Facility: CLINIC | Age: 39
End: 2024-08-07

## 2024-08-07 ENCOUNTER — VIRTUAL VISIT (OUTPATIENT)
Dept: PEDIATRICS | Facility: CLINIC | Age: 39
End: 2024-08-07
Payer: COMMERCIAL

## 2024-08-07 DIAGNOSIS — F90.0 ADHD (ATTENTION DEFICIT HYPERACTIVITY DISORDER), INATTENTIVE TYPE: Primary | ICD-10-CM

## 2024-08-07 PROCEDURE — G2211 COMPLEX E/M VISIT ADD ON: HCPCS | Mod: 95 | Performed by: INTERNAL MEDICINE

## 2024-08-07 PROCEDURE — 99213 OFFICE O/P EST LOW 20 MIN: CPT | Mod: 95 | Performed by: INTERNAL MEDICINE

## 2024-08-07 RX ORDER — LISDEXAMFETAMINE DIMESYLATE 30 MG/1
30 CAPSULE ORAL EVERY MORNING
Qty: 30 CAPSULE | Refills: 0 | Status: SHIPPED | OUTPATIENT
Start: 2024-08-07 | End: 2024-08-26 | Stop reason: SINTOL

## 2024-08-07 RX ORDER — LISDEXAMFETAMINE DIMESYLATE 20 MG/1
20 CAPSULE ORAL EVERY MORNING
Refills: 0 | Status: CANCELLED | OUTPATIENT
Start: 2024-08-07

## 2024-08-07 NOTE — TELEPHONE ENCOUNTER
Left voicemail for patient. Provider would like her to schedule a VV follow-up visit in one month. Options with Dr Hernandez are: VV f/up could offer 8/26 use (covisit slot), 8/22 1130 VV, or 9/4 (TAYA) VV. Also sent patient a [employer] message.  Mireille Cobian, CMA

## 2024-08-07 NOTE — PATIENT INSTRUCTIONS
Starting Vyvanse - I'm excited for you!    We'll reconnect in the next month and see how things are going.

## 2024-08-07 NOTE — PROGRESS NOTES
"Minerva is a 39 year old who is being evaluated via a billable video visit.          Assessment & Plan       ICD-10-CM    1. ADHD (attention deficit hyperactivity disorder), inattentive type  F90.0 lisdexamfetamine (VYVANSE) 30 MG capsule        Discussed r/b.   Reviewed testing.  --------------------------  PATIENT INSTRUCTIONS    Patient Instructions   Starting Vyvanse - I'm excited for you!    We'll reconnect in the next month and see how things are going.     --------------------------    BMI  Estimated body mass index is 29.21 kg/m  as calculated from the following:    Height as of 6/27/24: 1.676 m (5' 6\").    Weight as of 6/27/24: 82.1 kg (181 lb).     Subjective   Minerva is a 39 year old, presenting for the following health issues:  No chief complaint on file.    History of Present Illness       Reason for visit:  Assessment  Symptom onset:  More than a month  Symptoms include:  Adhd  Symptom progression:  Staying the same  Had these symptoms before:  Yes    She eats 2-3 servings of fruits and vegetables daily.She consumes 1 sweetened beverage(s) daily.She exercises with enough effort to increase her heart rate 9 or less minutes per day.  She exercises with enough effort to increase her heart rate 4 days per week. She is missing 2 dose(s) of medications per week.  She is not taking prescribed medications regularly due to remembering to take.       Interested in therapy/biofeedback but not a financial option right nwo.     Stopped wellbutrin - affected her sleep big time.       Objective           Vitals:  No vitals were obtained today due to virtual visit.    Physical Exam   GENERAL: alert and no distress  EYES: Eyes grossly normal to inspection.  No discharge or erythema, or obvious scleral/conjunctival abnormalities.  RESP: No audible wheeze, cough, or visible cyanosis.    SKIN: Visible skin clear. No significant rash, abnormal pigmentation or lesions.  NEURO: Cranial nerves grossly intact.  Mentation and " speech appropriate for age.  PSYCH: Appropriate affect, tone, and pace of words        Video-Visit Details    Type of service:  Video Visit   Originating Location (pt. Location): Home    Distant Location (provider location):  On-site  Platform used for Video Visit: Gosia  Signed Electronically by: Jeromy Hernandez MD    The longitudinal plan of care for the diagnosis(es)/condition(s) as documented were addressed during this visit. Due to the added complexity in care, I will continue to support Minerva in the subsequent management and with ongoing continuity of care.

## 2024-08-08 NOTE — TELEPHONE ENCOUNTER
On 7/29 pt scheduled a VV for 9/16 f/up with [facility]   Is an additional follow up needed?    Vidya Zelaya, VF

## 2024-08-21 ENCOUNTER — MYC MEDICAL ADVICE (OUTPATIENT)
Dept: SURGERY | Facility: CLINIC | Age: 39
End: 2024-08-21
Payer: COMMERCIAL

## 2024-08-26 ENCOUNTER — VIRTUAL VISIT (OUTPATIENT)
Dept: PEDIATRICS | Facility: CLINIC | Age: 39
End: 2024-08-26
Payer: COMMERCIAL

## 2024-08-26 DIAGNOSIS — F90.0 ADHD (ATTENTION DEFICIT HYPERACTIVITY DISORDER), INATTENTIVE TYPE: Primary | ICD-10-CM

## 2024-08-26 DIAGNOSIS — N92.0 MENORRHAGIA WITH REGULAR CYCLE: ICD-10-CM

## 2024-08-26 PROCEDURE — G2211 COMPLEX E/M VISIT ADD ON: HCPCS | Mod: 95 | Performed by: INTERNAL MEDICINE

## 2024-08-26 PROCEDURE — 99214 OFFICE O/P EST MOD 30 MIN: CPT | Mod: 95 | Performed by: INTERNAL MEDICINE

## 2024-08-26 RX ORDER — LISDEXAMFETAMINE DIMESYLATE 20 MG/1
20 CAPSULE ORAL EVERY MORNING
Qty: 30 CAPSULE | Refills: 0 | Status: SHIPPED | OUTPATIENT
Start: 2024-08-26

## 2024-08-26 NOTE — PROGRESS NOTES
"Minerva is a 39 year old who is being evaluated via a billable video visit.          Assessment & Plan       ICD-10-CM    1. ADHD (attention deficit hyperactivity disorder), inattentive type  F90.0 lisdexamfetamine (VYVANSE) 20 MG capsule      2. Menorrhagia with regular cycle  N92.0         --------------------------  PATIENT INSTRUCTIONS    Patient Instructions   Great to see you!    # ADHD  - I'm glad the attention is better but I wonder if a lower dose would actually help just as much with the attention AND make you a bit less short tempered  - trying vyvanse 20  - give it two weeks or so and then can send me an EVISIT  --- how are things going?  --- attention?  --- is it lasting the same length?  --- do you notice a big difference between morning and afternoon?  --- how are sleep and appetite  - depending on the above we can either add an afternoon booster or change medications to something like adderall xr.   - We''ll put something on the calendar for 6-8 weeks from now to reconnect on Video    # Heavy periods  - I'm sorry about all the spotting  - Continue the continuously birth control for now and we'll give it another month or two to regulate  - if it doesn't -> we'll change OCPs  - if really heavy clots that aren't stopping -> Please let me know sooner.     --------------------------            BMI  Estimated body mass index is 29.21 kg/m  as calculated from the following:    Height as of 6/27/24: 1.676 m (5' 6\").    Weight as of 6/27/24: 82.1 kg (181 lb).             Subjective   Minerva is a 39 year old, presenting for the following health issues:  No chief complaint on file.    History of Present Illness       Reason for visit:  Follow up    She eats 0-1 servings of fruits and vegetables daily.She consumes 0 sweetened beverage(s) daily.She exercises with enough effort to increase her heart rate 10 to 19 minutes per day.  She exercises with enough effort to increase her heart rate 4 days per week. She is " missing 1 dose(s) of medications per week.  She is not taking prescribed medications regularly due to remembering to take.       Last VV 8/7/24  Starting Vyvanse - I'm excited for you!     We'll reconnect in the next month and see how things are going.     ---------    #ADHD  A lot better focus at work.   By the time she's done at work it's hard to get things done at home.     says she's more short-tempered. She didn't notice this until he pointed this out.   Sleep is not terrible - better than before.   Not having any trouble going to sleep  Maybe some decrease in appetite (not hungry for lunch)    # Sleep  - most nights  - still uncomfortable from surgery - needs to reposition over night   - surgeon is happy but just needs time  - can't sleep on her back all night long    # Continuous OCP  - spotting nonstop  - doesn't need a tampon  - started second month that she skipped  - started out really light and then it was heavier and now it's lighter        Objective           Vitals:  No vitals were obtained today due to virtual visit.    Physical Exam   GENERAL: alert and no distress  EYES: Eyes grossly normal to inspection.  No discharge or erythema, or obvious scleral/conjunctival abnormalities.  RESP: No audible wheeze, cough, or visible cyanosis.    SKIN: Visible skin clear. No significant rash, abnormal pigmentation or lesions.  NEURO: Cranial nerves grossly intact.  Mentation and speech appropriate for age.  PSYCH: Appropriate affect, tone, and pace of words        Video-Visit Details    Type of service:  Video Visit   Originating Location (pt. Location): Home    Distant Location (provider location):  On-site  Platform used for Video Visit: Gosia  Signed Electronically by: Jeromy Hernandez MD      The longitudinal plan of care for the diagnosis(es)/condition(s) as documented were addressed during this visit. Due to the added complexity in care, I will continue to support Minerva in the  subsequent management and with ongoing continuity of care.

## 2024-08-26 NOTE — PATIENT INSTRUCTIONS
Great to see you!    # ADHD  - I'm glad the attention is better but I wonder if a lower dose would actually help just as much with the attention AND make you a bit less short tempered  - trying vyvanse 20  - give it two weeks or so and then can send me an EVISIT  --- how are things going?  --- attention?  --- is it lasting the same length?  --- do you notice a big difference between morning and afternoon?  --- how are sleep and appetite  - depending on the above we can either add an afternoon booster or change medications to something like adderall xr.   - We''ll put something on the calendar for 6-8 weeks from now to reconnect on Video    # Heavy periods  - I'm sorry about all the spotting  - Continue the continuously birth control for now and we'll give it another month or two to regulate  - if it doesn't -> we'll change OCPs  - if really heavy clots that aren't stopping -> Please let me know sooner.

## 2024-08-28 ENCOUNTER — MYC REFILL (OUTPATIENT)
Dept: PEDIATRICS | Facility: CLINIC | Age: 39
End: 2024-08-28
Payer: COMMERCIAL

## 2024-08-28 DIAGNOSIS — N92.0 MENORRHAGIA WITH REGULAR CYCLE: ICD-10-CM

## 2024-09-09 ENCOUNTER — MYC MEDICAL ADVICE (OUTPATIENT)
Dept: PEDIATRICS | Facility: CLINIC | Age: 39
End: 2024-09-09
Payer: COMMERCIAL

## 2024-09-17 NOTE — CONFIDENTIAL NOTE
Received message back from  advising as long as she is well healed and it has been greater than 3 months since surgery she may proceed with regularly scheduled mammograms. [employer] message sent to patient..Laura Danielle RN

## 2024-09-26 ENCOUNTER — LAB (OUTPATIENT)
Dept: LAB | Facility: CLINIC | Age: 39
End: 2024-09-26
Payer: COMMERCIAL

## 2024-09-26 DIAGNOSIS — N92.0 MENORRHAGIA WITH REGULAR CYCLE: ICD-10-CM

## 2024-09-26 LAB
ERYTHROCYTE [DISTWIDTH] IN BLOOD BY AUTOMATED COUNT: 13.6 % (ref 10–15)
FERRITIN SERPL-MCNC: 30 NG/ML (ref 6–175)
HCT VFR BLD AUTO: 38.9 % (ref 35–47)
HGB BLD-MCNC: 12.8 G/DL (ref 11.7–15.7)
IRON BINDING CAPACITY (ROCHE): 440 UG/DL (ref 240–430)
IRON SATN MFR SERPL: 14 % (ref 15–46)
IRON SERPL-MCNC: 63 UG/DL (ref 37–145)
MCH RBC QN AUTO: 30.2 PG (ref 26.5–33)
MCHC RBC AUTO-ENTMCNC: 32.9 G/DL (ref 31.5–36.5)
MCV RBC AUTO: 92 FL (ref 78–100)
PLATELET # BLD AUTO: 252 10E3/UL (ref 150–450)
RBC # BLD AUTO: 4.24 10E6/UL (ref 3.8–5.2)
WBC # BLD AUTO: 3.2 10E3/UL (ref 4–11)

## 2024-09-26 PROCEDURE — 82728 ASSAY OF FERRITIN: CPT

## 2024-09-26 PROCEDURE — 36415 COLL VENOUS BLD VENIPUNCTURE: CPT

## 2024-09-26 PROCEDURE — 85027 COMPLETE CBC AUTOMATED: CPT

## 2024-09-26 PROCEDURE — 83550 IRON BINDING TEST: CPT

## 2024-09-26 PROCEDURE — 83540 ASSAY OF IRON: CPT

## 2024-09-30 DIAGNOSIS — R79.89 ABNORMAL CBC: Primary | ICD-10-CM

## 2024-10-03 ENCOUNTER — E-VISIT (OUTPATIENT)
Dept: PEDIATRICS | Facility: CLINIC | Age: 39
End: 2024-10-03
Payer: COMMERCIAL

## 2024-10-03 DIAGNOSIS — F90.0 ADHD (ATTENTION DEFICIT HYPERACTIVITY DISORDER), INATTENTIVE TYPE: Primary | ICD-10-CM

## 2024-10-03 PROCEDURE — 99421 OL DIG E/M SVC 5-10 MIN: CPT | Performed by: INTERNAL MEDICINE

## 2024-10-03 ASSESSMENT — ANXIETY QUESTIONNAIRES
2. NOT BEING ABLE TO STOP OR CONTROL WORRYING: NOT AT ALL
GAD7 TOTAL SCORE: 2
8. IF YOU CHECKED OFF ANY PROBLEMS, HOW DIFFICULT HAVE THESE MADE IT FOR YOU TO DO YOUR WORK, TAKE CARE OF THINGS AT HOME, OR GET ALONG WITH OTHER PEOPLE?: NOT DIFFICULT AT ALL
3. WORRYING TOO MUCH ABOUT DIFFERENT THINGS: NOT AT ALL
6. BECOMING EASILY ANNOYED OR IRRITABLE: NOT AT ALL
IF YOU CHECKED OFF ANY PROBLEMS ON THIS QUESTIONNAIRE, HOW DIFFICULT HAVE THESE PROBLEMS MADE IT FOR YOU TO DO YOUR WORK, TAKE CARE OF THINGS AT HOME, OR GET ALONG WITH OTHER PEOPLE: NOT DIFFICULT AT ALL
GAD7 TOTAL SCORE: 2
1. FEELING NERVOUS, ANXIOUS, OR ON EDGE: NOT AT ALL
5. BEING SO RESTLESS THAT IT IS HARD TO SIT STILL: SEVERAL DAYS
4. TROUBLE RELAXING: SEVERAL DAYS
7. FEELING AFRAID AS IF SOMETHING AWFUL MIGHT HAPPEN: NOT AT ALL
7. FEELING AFRAID AS IF SOMETHING AWFUL MIGHT HAPPEN: NOT AT ALL
GAD7 TOTAL SCORE: 2

## 2024-10-03 ASSESSMENT — PATIENT HEALTH QUESTIONNAIRE - PHQ9
SUM OF ALL RESPONSES TO PHQ QUESTIONS 1-9: 8
SUM OF ALL RESPONSES TO PHQ QUESTIONS 1-9: 8
10. IF YOU CHECKED OFF ANY PROBLEMS, HOW DIFFICULT HAVE THESE PROBLEMS MADE IT FOR YOU TO DO YOUR WORK, TAKE CARE OF THINGS AT HOME, OR GET ALONG WITH OTHER PEOPLE: SOMEWHAT DIFFICULT

## 2024-10-04 RX ORDER — LISDEXAMFETAMINE DIMESYLATE 30 MG/1
30 CAPSULE ORAL EVERY MORNING
Qty: 30 CAPSULE | Refills: 0 | Status: SHIPPED | OUTPATIENT
Start: 2024-10-04 | End: 2024-10-13

## 2024-10-04 NOTE — PATIENT INSTRUCTIONS
Thank you for checking in. I have adjusted your medication to manage your symptoms. The following medication was sent to your pharmacy:    Orders Placed This Encounter   Medications     lisdexamfetamine (VYVANSE) 30 MG capsule     Sig: Take 1 capsule (30 mg) by mouth every morning.     Dispense:  30 capsule     Refill:  0          View your full visit summary for details by clicking on the link below. Your pharmacist will be able to address any questions you may have about the medication.       Please send an eVisit to follow up on this medication change in 3 weeks, or sooner if needed.     Thank you for choosing us for your care.

## 2024-10-13 RX ORDER — LISDEXAMFETAMINE DIMESYLATE 30 MG/1
30 CAPSULE ORAL EVERY MORNING
Qty: 30 CAPSULE | Refills: 0 | Status: SHIPPED | OUTPATIENT
Start: 2024-10-13

## 2024-10-14 NOTE — TELEPHONE ENCOUNTER
"2nd attempt to call [employer]. \"There are more than 3 callers ahead of you.\"    Daria Coppola on 10/14/2024 at 3:13 PM    "

## 2024-10-14 NOTE — TELEPHONE ENCOUNTER
3rd attempt: on hold for over 10 minutes. E-prescribe shows pharmacy approved cancellation.     Daria Coppola on 10/14/2024 at 5:21 PM

## 2024-10-14 NOTE — TELEPHONE ENCOUNTER
"Called pt to notify. Called Elisabte, after holding for 16 min an automated voice stated \"I'm sorry, we are experiencing difficulties. Please call back at a later time. Goodbye.\" Then disconnected.     Daria Coppola on 10/14/2024 at 10:46 AM    "

## 2024-10-14 NOTE — TELEPHONE ENCOUNTER
Script sent to [facility] - please update pt.    Please cancel script at other pharmacy.     SANTOS Hernandez MD  Internal Medicine-Pediatrics

## 2024-10-28 ENCOUNTER — VIRTUAL VISIT (OUTPATIENT)
Dept: PEDIATRICS | Facility: CLINIC | Age: 39
End: 2024-10-28
Payer: COMMERCIAL

## 2024-10-28 DIAGNOSIS — F90.0 ADHD (ATTENTION DEFICIT HYPERACTIVITY DISORDER), INATTENTIVE TYPE: Primary | ICD-10-CM

## 2024-10-28 DIAGNOSIS — N92.0 MENORRHAGIA WITH REGULAR CYCLE: ICD-10-CM

## 2024-10-28 PROCEDURE — 99214 OFFICE O/P EST MOD 30 MIN: CPT | Mod: 95 | Performed by: INTERNAL MEDICINE

## 2024-10-28 PROCEDURE — G2211 COMPLEX E/M VISIT ADD ON: HCPCS | Mod: 95 | Performed by: INTERNAL MEDICINE

## 2024-10-28 NOTE — PROGRESS NOTES
"Minerva is a 39 year old who is being evaluated via a billable video visit.          Assessment & Plan       ICD-10-CM    1. ADHD (attention deficit hyperactivity disorder), inattentive type  F90.0       2. Menorrhagia with regular cycle  N92.0 CBC with platelets     Ferritin        --------------------------  PATIENT INSTRUCTIONS    Patient Instructions   Keeping vyvanse at 30 mg daily.  Send me VANCL message about a week before you're due.   I usually try to send 3 prescriptions at a time.     Seeing how the periods go the next 3 months  - if heavy/lots of cramping  --- mirena IUD (with premeds and meds for pain)  --- different birth control pill    We'll connect in 3 mo- keep taking the iron!        --------------------------            BMI  Estimated body mass index is 29.21 kg/m  as calculated from the following:    Height as of 6/27/24: 1.676 m (5' 6\").    Weight as of 6/27/24: 82.1 kg (181 lb).         Subjective   Minerva is a 39 year old, presenting for the following health issues:  No chief complaint on file.    HPI     Last VV 8/25/24  # Heavy periods  - I'm sorry about all the spotting  - Continue the continuously birth control for now and we'll give it another month or two to regulate  - if it doesn't -> we'll change OCPs  - if really heavy clots that aren't stopping -> Please let me know sooner.       # ADHD  - go back to the 30 mg and watch to see if it affects your mood   - keep the 20 xl and then consider adding a short acting afternoon booster    - Irritability was just life   - going back to 30 g was noticeably better.   - things are in a better place  - sleeping is not as good - staying asleep  - also not sure if the sleep is more weather related, body hurts. Once it gets actually cold she's okay.     # Menorrhagia  - had to stop taking the birth control pill to stop the spotting  - didn't start taking it again  - next period afterwards was much lighter, then heavier   - copper IUD was painful " in/out, some cramping pain/sharp w/ periods... pain wasn't consistent          Objective           Vitals:  No vitals were obtained today due to virtual visit.    Physical Exam   GENERAL: alert and no distress  EYES: Eyes grossly normal to inspection.  No discharge or erythema, or obvious scleral/conjunctival abnormalities.  RESP: No audible wheeze, cough, or visible cyanosis.    SKIN: Visible skin clear. No significant rash, abnormal pigmentation or lesions.  NEURO: Cranial nerves grossly intact.  Mentation and speech appropriate for age.  PSYCH: Appropriate affect, tone, and pace of words        Video-Visit Details    Type of service:  Video Visit   Originating Location (pt. Location): Home    Distant Location (provider location):  On-site  Platform used for Video Visit: Gosia  Signed Electronically by: Jeromy Hernandez MD  The longitudinal plan of care for the diagnosis(es)/condition(s) as documented were addressed during this visit. Due to the added complexity in care, I will continue to support Minerva in the subsequent management and with ongoing continuity of care.

## 2024-10-28 NOTE — PATIENT INSTRUCTIONS
Keeping vyvanse at 30 mg daily.  Send me [employer] message about a week before you're due.   I usually try to send 3 prescriptions at a time.     Seeing how the periods go the next 3 months  - if heavy/lots of cramping  --- mirena IUD (with premeds and meds for pain)  --- different birth control pill    We'll connect in 3 mo- keep taking the iron!

## 2024-10-31 ENCOUNTER — ALLIED HEALTH/NURSE VISIT (OUTPATIENT)
Dept: FAMILY MEDICINE | Facility: CLINIC | Age: 39
End: 2024-10-31
Payer: COMMERCIAL

## 2024-10-31 DIAGNOSIS — Z23 NEED FOR HEPATITIS B BOOSTER VACCINATION: Primary | ICD-10-CM

## 2024-10-31 DIAGNOSIS — Z23 NEED FOR COVID-19 VACCINE: ICD-10-CM

## 2024-10-31 DIAGNOSIS — Z23 NEED FOR INFLUENZA VACCINATION: ICD-10-CM

## 2024-10-31 PROCEDURE — 99207 PR NO CHARGE NURSE ONLY: CPT

## 2024-10-31 PROCEDURE — 91320 SARSCV2 VAC 30MCG TRS-SUC IM: CPT

## 2024-10-31 PROCEDURE — 90480 ADMN SARSCOV2 VAC 1/ONLY CMP: CPT

## 2024-10-31 PROCEDURE — 90471 IMMUNIZATION ADMIN: CPT

## 2024-10-31 PROCEDURE — 90656 IIV3 VACC NO PRSV 0.5 ML IM: CPT

## 2024-10-31 PROCEDURE — 90472 IMMUNIZATION ADMIN EACH ADD: CPT

## 2024-10-31 PROCEDURE — 90746 HEPB VACCINE 3 DOSE ADULT IM: CPT

## 2024-10-31 NOTE — PROGRESS NOTES
Prior to immunization administration, verified patients identity using patient s name and date of birth. Please see Immunization Activity for additional information.     Is the patient's temperature normal (100.5 or less)? Yes     Patient MEETS CRITERIA. PROCEED with vaccine administration.      Patient instructed to remain in clinic for 15 minutes afterwards, and to report any adverse reactions.      Link to Ancillary Visit Immunization Standing Orders SmartSet     Screening performed by Elena Yu CMA on 10/31/2024 at 8:10 AM.

## 2024-10-31 NOTE — PROGRESS NOTES
PRS    Doing well.  Happy with the result.  The rash has improved.  Patient has not started scar treatment yet.    On exam, bilateral breast incisions are well remodeling with no wounds or signs of infection.  Reasonable symmetry.    A/P: 38-year-old female 6 weeks status post bilateral breast reduction, doing well    -Cleared for all activities, including swimming and lifting with no restrictions  -Patient can initiate scar treatment with silicone based sheeting or bio oil with gentle circular massage.  Focus on areas that are little bit more firm.  She is scarring very well.  -Return to clinic as needed  -Photography    Hardeep Arredondo MD, PhD   Detail Level: Detailed Size Of Lesion In Cm (Optional): 0 Morphology Per Location (Optional): pBCC, right central zygoma, bx 8/11/2022, sampled margins negative Morphology Per Location (Optional): Pigmented BCC: 6/14/2021 biopsy proven by prior derm; she did not return for tx; no evidence of recurrence, thus will follow for now. Morphology Per Location (Optional): 2012 exc Size Of Lesion In Cm (Optional): 0.3 Morphology Per Location (Optional): Present x 2 years, per patient and without change

## 2024-11-14 ENCOUNTER — MYC REFILL (OUTPATIENT)
Dept: PEDIATRICS | Facility: CLINIC | Age: 39
End: 2024-11-14
Payer: COMMERCIAL

## 2024-11-14 DIAGNOSIS — F90.0 ADHD (ATTENTION DEFICIT HYPERACTIVITY DISORDER), INATTENTIVE TYPE: ICD-10-CM

## 2024-11-14 RX ORDER — LISDEXAMFETAMINE DIMESYLATE 30 MG/1
30 CAPSULE ORAL EVERY MORNING
Qty: 30 CAPSULE | Refills: 0 | Status: CANCELLED | OUTPATIENT
Start: 2024-11-14

## 2024-11-15 RX ORDER — LISDEXAMFETAMINE DIMESYLATE 30 MG/1
30 CAPSULE ORAL DAILY
Qty: 30 CAPSULE | Refills: 0 | Status: SHIPPED | OUTPATIENT
Start: 2024-12-15 | End: 2025-01-14

## 2024-11-15 RX ORDER — LISDEXAMFETAMINE DIMESYLATE 30 MG/1
30 CAPSULE ORAL DAILY
Qty: 30 CAPSULE | Refills: 0 | Status: SHIPPED | OUTPATIENT
Start: 2024-11-15 | End: 2024-12-15

## 2024-11-15 RX ORDER — LISDEXAMFETAMINE DIMESYLATE 30 MG/1
30 CAPSULE ORAL DAILY
Qty: 30 CAPSULE | Refills: 0 | Status: SHIPPED | OUTPATIENT
Start: 2025-01-14 | End: 2025-02-13

## 2024-11-18 ENCOUNTER — VIRTUAL VISIT (OUTPATIENT)
Dept: PEDIATRICS | Facility: CLINIC | Age: 39
End: 2024-11-18
Payer: COMMERCIAL

## 2024-11-18 DIAGNOSIS — N92.0 MENORRHAGIA WITH REGULAR CYCLE: Primary | ICD-10-CM

## 2024-11-18 PROCEDURE — G2211 COMPLEX E/M VISIT ADD ON: HCPCS | Mod: 95 | Performed by: INTERNAL MEDICINE

## 2024-11-18 PROCEDURE — 99213 OFFICE O/P EST LOW 20 MIN: CPT | Mod: 95 | Performed by: INTERNAL MEDICINE

## 2024-11-18 NOTE — PATIENT INSTRUCTIONS
[facility] ([place])   Dr. Dawn Diana ([place])    [facility] - also has a BV office  Dr. Jules    Let's do a pelvic US and have you meet with OB to see if an ablation might be helpful.

## 2024-11-18 NOTE — PROGRESS NOTES
"Minerva is a 39 year old who is being evaluated via a billable video visit.          Assessment & Plan       ICD-10-CM    1. Menorrhagia with regular cycle  N92.0 US Pelvic Complete with Transvaginal        Not super interested in hormonal treatment.   Doesn't desire future pregnancy.   Will have her meet with OB to see if ablation would be something to consider.     --------------------------  PATIENT INSTRUCTIONS    Patient Instructions   [place]  [facility] ([place])   Dr. Dawn Diana ([place])    [facility] - also has a BV office  Dr. Jules    Let's do a pelvic US and have you meet with OB to see if an ablation might be helpful.         --------------------------        BMI  Estimated body mass index is 29.21 kg/m  as calculated from the following:    Height as of 6/27/24: 1.676 m (5' 6\").    Weight as of 6/27/24: 82.1 kg (181 lb).             Subjective   Minerva is a 39 year old, presenting for the following health issues:  No chief complaint on file.    HPI       Ablation   No plans for kids - would be okay if door was closed  Cramping can sometimes be bad - used to be worse and gotten better if on heartbutn pills        Objective           Vitals:  No vitals were obtained today due to virtual visit.    Physical Exam   GENERAL: alert and no distress  EYES: Eyes grossly normal to inspection.  No discharge or erythema, or obvious scleral/conjunctival abnormalities.  RESP: No audible wheeze, cough, or visible cyanosis.    SKIN: Visible skin clear. No significant rash, abnormal pigmentation or lesions.  NEURO: Cranial nerves grossly intact.  Mentation and speech appropriate for age.  PSYCH: Appropriate affect, tone, and pace of words        Video-Visit Details    Type of service:  Video Visit   Originating Location (pt. Location): Home    Distant Location (provider location):  On-site  Platform used for Video Visit: Gosia  Signed Electronically by: Jeromy Hernandez MD    The " longitudinal plan of care for the diagnosis(es)/condition(s) as documented were addressed during this visit. Due to the added complexity in care, I will continue to support Minerva in the subsequent management and with ongoing continuity of care.

## 2024-12-10 ENCOUNTER — HOSPITAL ENCOUNTER (OUTPATIENT)
Dept: ULTRASOUND IMAGING | Facility: CLINIC | Age: 39
Discharge: HOME | End: 2024-12-10
Attending: INTERNAL MEDICINE
Payer: COMMERCIAL

## 2024-12-10 DIAGNOSIS — N92.0 MENORRHAGIA WITH REGULAR CYCLE: ICD-10-CM

## 2024-12-10 PROCEDURE — 76856 US EXAM PELVIC COMPLETE: CPT

## 2024-12-10 PROCEDURE — 76830 TRANSVAGINAL US NON-OB: CPT

## 2024-12-26 ENCOUNTER — MYC REFILL (OUTPATIENT)
Dept: PEDIATRICS | Facility: CLINIC | Age: 39
End: 2024-12-26
Payer: COMMERCIAL

## 2024-12-26 DIAGNOSIS — F90.0 ADHD (ATTENTION DEFICIT HYPERACTIVITY DISORDER), INATTENTIVE TYPE: ICD-10-CM

## 2024-12-26 RX ORDER — LISDEXAMFETAMINE DIMESYLATE 30 MG/1
30 CAPSULE ORAL DAILY
Qty: 30 CAPSULE | Refills: 0 | OUTPATIENT
Start: 2024-12-26

## 2025-01-14 ENCOUNTER — E-VISIT (OUTPATIENT)
Dept: PEDIATRICS | Facility: CLINIC | Age: 40
End: 2025-01-14
Payer: COMMERCIAL

## 2025-01-14 DIAGNOSIS — F40.243 FEAR OF FLYING: ICD-10-CM

## 2025-01-14 DIAGNOSIS — Z87.898 HISTORY OF MOTION SICKNESS: Primary | ICD-10-CM

## 2025-01-14 RX ORDER — LORAZEPAM 0.5 MG/1
0.5 TABLET ORAL DAILY PRN
Qty: 10 TABLET | Refills: 0 | Status: SHIPPED | OUTPATIENT
Start: 2025-01-14

## 2025-01-14 RX ORDER — SCOPOLAMINE 1 MG/3D
1 PATCH, EXTENDED RELEASE TRANSDERMAL
Qty: 5 PATCH | Refills: 0 | Status: SHIPPED | OUTPATIENT
Start: 2025-01-14

## 2025-01-14 RX ORDER — HYDROXYZINE HYDROCHLORIDE 25 MG/1
25-50 TABLET, FILM COATED ORAL DAILY PRN
Qty: 15 TABLET | Refills: 0 | Status: SHIPPED | OUTPATIENT
Start: 2025-01-14

## 2025-01-14 NOTE — PATIENT INSTRUCTIONS
Thank you for choosing us for your care. I have placed an order for a prescription so that you can start treatment. View your full visit summary for details by clicking on the link below. Your pharmacist will able to address any questions you may have about the medication.     If you're not feeling better within 5-7 days, please schedule an appointment.  You can schedule an appointment right here in [facility], or call [phone]  If the visit is for the same symptoms as your eVisit, we'll refund the cost of your eVisit if seen within seven days.

## 2025-02-03 ENCOUNTER — LAB (OUTPATIENT)
Dept: LAB | Facility: CLINIC | Age: 40
End: 2025-02-03
Payer: COMMERCIAL

## 2025-02-03 DIAGNOSIS — R79.89 ABNORMAL CBC: ICD-10-CM

## 2025-02-03 DIAGNOSIS — N92.0 MENORRHAGIA WITH REGULAR CYCLE: ICD-10-CM

## 2025-02-03 LAB
BASOPHILS # BLD AUTO: 0 10E3/UL (ref 0–0.2)
BASOPHILS NFR BLD AUTO: 1 %
EOSINOPHIL # BLD AUTO: 0.2 10E3/UL (ref 0–0.7)
EOSINOPHIL NFR BLD AUTO: 4 %
ERYTHROCYTE [DISTWIDTH] IN BLOOD BY AUTOMATED COUNT: 13.7 % (ref 10–15)
FERRITIN SERPL-MCNC: 11 NG/ML (ref 6–175)
HCT VFR BLD AUTO: 37.2 % (ref 35–47)
HGB BLD-MCNC: 12 G/DL (ref 11.7–15.7)
IMM GRANULOCYTES # BLD: 0 10E3/UL
IMM GRANULOCYTES NFR BLD: 0 %
LYMPHOCYTES # BLD AUTO: 1.3 10E3/UL (ref 0.8–5.3)
LYMPHOCYTES NFR BLD AUTO: 30 %
MCH RBC QN AUTO: 27.4 PG (ref 26.5–33)
MCHC RBC AUTO-ENTMCNC: 32.3 G/DL (ref 31.5–36.5)
MCV RBC AUTO: 85 FL (ref 78–100)
MONOCYTES # BLD AUTO: 0.4 10E3/UL (ref 0–1.3)
MONOCYTES NFR BLD AUTO: 8 %
NEUTROPHILS # BLD AUTO: 2.5 10E3/UL (ref 1.6–8.3)
NEUTROPHILS NFR BLD AUTO: 57 %
PLATELET # BLD AUTO: 258 10E3/UL (ref 150–450)
RBC # BLD AUTO: 4.38 10E6/UL (ref 3.8–5.2)
WBC # BLD AUTO: 4.4 10E3/UL (ref 4–11)

## 2025-02-03 PROCEDURE — 82728 ASSAY OF FERRITIN: CPT

## 2025-02-03 PROCEDURE — 36415 COLL VENOUS BLD VENIPUNCTURE: CPT

## 2025-02-03 PROCEDURE — 85025 COMPLETE CBC W/AUTO DIFF WBC: CPT

## 2025-02-10 ENCOUNTER — VIRTUAL VISIT (OUTPATIENT)
Dept: PEDIATRICS | Facility: CLINIC | Age: 40
End: 2025-02-10
Payer: COMMERCIAL

## 2025-02-10 DIAGNOSIS — R79.0 LOW IRON STORES: ICD-10-CM

## 2025-02-10 DIAGNOSIS — F90.0 ADHD (ATTENTION DEFICIT HYPERACTIVITY DISORDER), INATTENTIVE TYPE: Primary | ICD-10-CM

## 2025-02-10 DIAGNOSIS — N92.0 MENORRHAGIA WITH REGULAR CYCLE: ICD-10-CM

## 2025-02-10 PROCEDURE — 98006 SYNCH AUDIO-VIDEO EST MOD 30: CPT | Performed by: INTERNAL MEDICINE

## 2025-02-10 RX ORDER — LISDEXAMFETAMINE DIMESYLATE 40 MG/1
40 CAPSULE ORAL DAILY
Qty: 30 CAPSULE | Refills: 0 | Status: SHIPPED | OUTPATIENT
Start: 2025-03-12 | End: 2025-04-11

## 2025-02-10 RX ORDER — LISDEXAMFETAMINE DIMESYLATE 40 MG/1
40 CAPSULE ORAL DAILY
Qty: 30 CAPSULE | Refills: 0 | Status: SHIPPED | OUTPATIENT
Start: 2025-04-11 | End: 2025-05-11

## 2025-02-10 RX ORDER — LISDEXAMFETAMINE DIMESYLATE 40 MG/1
40 CAPSULE ORAL DAILY
Qty: 30 CAPSULE | Refills: 0 | Status: SHIPPED | OUTPATIENT
Start: 2025-02-10 | End: 2025-03-12

## 2025-02-10 NOTE — PROGRESS NOTES
"Minerva is a 39 year old who is being evaluated via a billable video visit.          Assessment & Plan       ICD-10-CM    1. ADHD (attention deficit hyperactivity disorder), inattentive type  F90.0       2. Low iron stores  R79.0       3. Menorrhagia with regular cycle  N92.0         --------------------------  PATIENT INSTRUCTIONS    Patient Instructions   Dr. Deirdre Milligan - wonderful pediatrician, gender affirming. She should have some Peds-specific spots to get kids in.     ADHD  - increasing vyvanse to 40  - I'm pretty sure this will get us there... send me an EVISIT in 2-3 months and write me a paragraph about how things are going.     Iron stores  - I think this will get better once we have a plan for your periods - I'll watch for Dr. Olson's note.     Be in touch sooner if things aren't going well.     --------------------------        BMI  Estimated body mass index is 29.21 kg/m  as calculated from the following:    Height as of 6/27/24: 1.676 m (5' 6\").    Weight as of 6/27/24: 82.1 kg (181 lb).       Subjective   Minerva is a 39 year old, presenting for the following health issues:  No chief complaint on file.    HPI     Last VV 11/2024  [place]  [facility] ([place])   Dr. Dawn Diana ([place])     [facility] - also has a BV office  Dr. Jules     Let's do a pelvic US and have you meet with OB to see if an ablation might be helpful.     VV 10/2024  Keeping vyvanse at 30 mg daily.     # Social  - just got back from cruise - had a good time   - went to the [facility], 3 nights, 4 ays  - didn't end up needing scopolamine patch (gave her dry mouth)  - used hydroxyzine on the way there but didn't last the whole time; ativan on the way home and it was good.     # OB  - has upcoming appt w/ Dr. Olson 2/20/25    # ADHD  - vyvanse is okay - feels like it's not working as well as it was when she first started taking it.  - good some days and some days doesn't work as well.   - on off days just not " as good  - wears off at a reasonable time mostly        Objective           Vitals:  No vitals were obtained today due to virtual visit.    Physical Exam   GENERAL: alert and no distress  EYES: Eyes grossly normal to inspection.  No discharge or erythema, or obvious scleral/conjunctival abnormalities.  RESP: No audible wheeze, cough, or visible cyanosis.    SKIN: Visible skin clear. No significant rash, abnormal pigmentation or lesions.  NEURO: Cranial nerves grossly intact.  Mentation and speech appropriate for age.  PSYCH: Appropriate affect, tone, and pace of words        Video-Visit Details    Type of service:  Video Visit   Originating Location (pt. Location): Home    Distant Location (provider location):  On-site  Platform used for Video Visit: Gosia  Signed Electronically by: Jeromy Hernandez MD  The longitudinal plan of care for the diagnosis(es)/condition(s) as documented were addressed during this visit. Due to the added complexity in care, I will continue to support Minerva in the subsequent management and with ongoing continuity of care.

## 2025-02-10 NOTE — PATIENT INSTRUCTIONS
Dr. Deirdre Milligan - wonderful pediatrician, gender affirming. She should have some Peds-specific spots to get kids in.     ADHD  - increasing vyvanse to 40  - I'm pretty sure this will get us there... send me an EVISIT in 2-3 months and write me a paragraph about how things are going.     Iron stores  - I think this will get better once we have a plan for your periods - I'll watch for Dr. Olson's note.     Be in touch sooner if things aren't going well.

## 2025-02-20 ENCOUNTER — OFFICE VISIT (OUTPATIENT)
Dept: OBGYN | Facility: CLINIC | Age: 40
End: 2025-02-20
Payer: COMMERCIAL

## 2025-02-20 ENCOUNTER — TELEPHONE (OUTPATIENT)
Dept: OBGYN | Facility: CLINIC | Age: 40
End: 2025-02-20

## 2025-02-20 VITALS — SYSTOLIC BLOOD PRESSURE: 132 MMHG | BODY MASS INDEX: 28.08 KG/M2 | WEIGHT: 174 LBS | DIASTOLIC BLOOD PRESSURE: 88 MMHG

## 2025-02-20 DIAGNOSIS — N92.0 MENORRHAGIA WITH REGULAR CYCLE: Primary | ICD-10-CM

## 2025-02-20 NOTE — PROGRESS NOTES
SUBJECTIVE:                                                     Minerva Cardona is a 39 year old female  who presents today for menorrhagia.    Always been heavy. Regular once a month, 7-8 days long, 3 heavy days.  Menstrual cup changes every 2 hours. Clots occasionally. Cramps on heavy days.    She previously tried oral contraceptive pills and had mood changes, so does not want to try this again. She is not interested in an IUD, had one previously and did not like it.    US was previously done and this was reviewed today. Notes from her primary care provider and labs reviewed. She has been anemic at times, currently stable on iron supplementation.        Problem list and histories reviewed & adjusted, as indicated.  Additional history: as documented.    Patient Active Problem List   Diagnosis    CARDIOVASCULAR SCREENING; LDL GOAL LESS THAN 160    Cervical high risk HPV (human papillomavirus) test positive    Chronic pain of both knees    Joint laxity    Macromastia    Anxiety    Low iron stores    Menorrhagia with regular cycle    ADHD (attention deficit hyperactivity disorder), inattentive type     Past Surgical History:   Procedure Laterality Date    MAMMOPLASTY REDUCTION BILATERAL Bilateral 3/22/2024    Procedure: BILATERAL REDUCTION MAMMAPLASTY;  Surgeon: Hardeep Arredondo MD;  Location:  OR    NO HISTORY OF SURGERY        Social History     Tobacco Use    Smoking status: Former     Current packs/day: 0.00     Average packs/day: 0.1 packs/day for 1 year (0.1 ttl pk-yrs)     Types: Cigarettes     Start date: 2017     Quit date: 2018     Years since quittin.6     Passive exposure: Past    Smokeless tobacco: Never   Substance Use Topics    Alcohol use: Yes     Comment: on occasion      Problem (# of Occurrences) Relation (Name,Age of Onset)    Rheumatologic Disease (1) Maternal Aunt: MCTD    Cancer (2) Maternal Grandmother (Dwight Dixon): lymphoma, Paternal Grandmother    Diabetes  (1) Maternal Grandfather (Kamari Dixon)    Lupus (1) Maternal Aunt    Other Cancer (3) Maternal Grandmother (Dwight Dixon), Maternal Grandfather (Kamari Dixon), Cousin (Terry)    Scleroderma (1) Maternal Aunt    Colon Cancer (1) Maternal Grandfather (Kamari Dixon): In his mid 60s    Skin Cancer (1) Mother: Precancerous - not melanoma    Alcoholism (2) Father, Paternal Grandfather           Negative family history of: Breast Cancer              Current Outpatient Medications   Medication Sig Dispense Refill    famotidine (PEPCID) 20 MG tablet Take 20 mg by mouth 2 times daily      fluticasone (FLONASE) 50 MCG/ACT nasal spray Spray 1 spray into both nostrils daily      ibuprofen (ADVIL/MOTRIN) 200 MG capsule Take 200 mg by mouth every 4 hours as needed for fever      lisdexamfetamine (VYVANSE) 40 MG capsule Take 1 capsule (40 mg) by mouth daily. 30 capsule 0    [START ON 3/12/2025] lisdexamfetamine (VYVANSE) 40 MG capsule Take 1 capsule (40 mg) by mouth daily. 30 capsule 0    [START ON 4/11/2025] lisdexamfetamine (VYVANSE) 40 MG capsule Take 1 capsule (40 mg) by mouth daily. 30 capsule 0    valACYclovir (VALTREX) 1000 mg tablet Take 2 tablets (2,000 mg) by mouth 2 times daily 24 tablet 3    VITAMIN D PO        No current facility-administered medications for this visit.     No Known Allergies    ROS:  Negative other than as noted in HPI.    OBJECTIVE:     Exam:  Constitutional:  Appearance: Well nourished, well developed alert, in no acute distress  Neurologic/Psychiatric:  Mental Status:  Oriented X3       In-Clinic Test Results:  No results found for this or any previous visit (from the past 24 hours).    ASSESSMENT/PLAN:                                                        ICD-10-CM    1. Menorrhagia with regular cycle  N92.0             Patient counselled on etiologies of abnormal bleeding, evaluation for etiology, options for treatment including hormonal management, progestin IUD, endometrial ablation,  hysterectomy.      Plan is hysteroscopy, dilation and curettage, endometrial ablation. Her partner has had a vasectomy.  We discussed the need for permanent birth control, so if she has a new partner she would need to address that.    We also discussed that while the endometrial ablation might decrease the amount of bleeding she has, at her age it is less likely to result in amenorrhea.  In some cases endometrial ablation is not able to be performed.  In addition, it may not last continually to menopause.    Raysa Olson MD  [facility] [place]

## 2025-02-20 NOTE — TELEPHONE ENCOUNTER
Surgeon: Dr Shira Diana   Assist:  No   Location: [facility]   Date/time preference:  per patient     Surgery:  hysteroscopy, dilation and curettage, Novasure ablation     Length of Surgery:  30 min   Diagnosis:  menorrhagia   Anesthesia type:  GENERAL     Special instructions / equipment:  not applicable   Am admit or same day: SAME DAY   Bowel prep: No   Pre op: PCP   Office visit with surgeon prior to surgery: No   Schedule Post Op: 2 weeks     Raysa Olson MD   [facility]

## 2025-02-20 NOTE — TELEPHONE ENCOUNTER
Type of surgery: HYSTEROSCOPY, DILATION AND CURETTAGE, NOVASURE ABLATION  Location of surgery: [facility]  Date and time of surgery: 3/19/25 @ 9:00 AM  Surgeon: DR PAGAN  Pre-Op Appt Date: PATIENT ADVISED TO SCHEDULE.  Post-Op Appt Date: 4/8/25   Packet sent out: Yes  Pre-cert/Authorization completed:  No  Date: 2/20/25

## 2025-02-22 ENCOUNTER — MYC MEDICAL ADVICE (OUTPATIENT)
Dept: PEDIATRICS | Facility: CLINIC | Age: 40
End: 2025-02-22
Payer: COMMERCIAL

## 2025-03-06 ENCOUNTER — OFFICE VISIT (OUTPATIENT)
Dept: PEDIATRICS | Facility: CLINIC | Age: 40
End: 2025-03-06
Payer: COMMERCIAL

## 2025-03-06 VITALS
SYSTOLIC BLOOD PRESSURE: 130 MMHG | RESPIRATION RATE: 16 BRPM | HEIGHT: 65 IN | WEIGHT: 174 LBS | DIASTOLIC BLOOD PRESSURE: 80 MMHG | HEART RATE: 108 BPM | TEMPERATURE: 97.1 F | BODY MASS INDEX: 28.99 KG/M2 | OXYGEN SATURATION: 99 %

## 2025-03-06 DIAGNOSIS — N92.0 MENORRHAGIA WITH REGULAR CYCLE: ICD-10-CM

## 2025-03-06 DIAGNOSIS — H61.23 IMPACTED CERUMEN OF BOTH EARS: ICD-10-CM

## 2025-03-06 DIAGNOSIS — Z01.818 PREOP GENERAL PHYSICAL EXAM: Primary | ICD-10-CM

## 2025-03-06 ASSESSMENT — PAIN SCALES - GENERAL: PAINLEVEL_OUTOF10: NO PAIN (0)

## 2025-03-06 NOTE — PROGRESS NOTES
Preoperative Evaluation  [facility]  [address]  [address]  [place] [address]  Phone: [phone]  Fax: [phone]  Primary Provider: Jeromy Hernandez MD  Pre-op Performing Provider: Ivette Vargas MD  Mar 6, 2025             3/6/2025   Surgical Information   What procedure is being done? Uterine ablation   Facility or Hospital where procedure/surgery will be performed: [facility]   Who is doing the procedure / surgery? Dr. Olson   Date of surgery / procedure: March 19th   Time of surgery / procedure: 9 am   Where do you plan to recover after surgery? at home with family     Fax number for surgical facility: Note does not need to be faxed, will be available electronically in Epic.    Assessment & Plan     The proposed surgical procedure is considered LOW risk.      ICD-10-CM    1. Preop general physical exam  Z01.818       2. Menorrhagia with regular cycle  N92.0       3. Impacted cerumen of both ears  H61.23 FL REMOVAL IMPACTED CERUMEN IRRIGATION/LVG UNILAT (RN/MA)                - No identified additional risk factors other than previously addressed    Antiplatelet or Anticoagulation Medication Instructions   - We reviewed the medication list and the patient is not on an antiplatelet or anticoagulation medications.    Additional Medication Instructions  We reviewed the medication list and there are no chronic medications that need to be adjusted for this procedure.    Recommendation  Approval given to proceed with proposed procedure, without further diagnostic evaluation.    Brant Palma is a 39 year old, presenting for the following:  Pre-Op Exam          3/6/2025     9:51 AM   Additional Questions   Roomed by Becca VINSON CMA   Accompanied by Self         3/6/2025     9:51 AM   Patient Reported Additional Medications   Patient reports taking the following new medications n/a     HPI: severe menstrual bleeding causing anemia and  requiring hysteroscopy, dilation and curettage, endometrial ablation          3/6/2025   Pre-Op Questionnaire   Have you ever had a heart attack or stroke? No   Have you ever had surgery on your heart or blood vessels, such as a stent placement, a coronary artery bypass, or surgery on an artery in your head, neck, heart, or legs? No   Do you have chest pain with activity? No   Do you have a history of heart failure? No   Do you currently have a cold, bronchitis or symptoms of other infection? (!) Yes - has been having some nasal skin irritation   Do you have a cough, shortness of breath, or wheezing? No   Do you or anyone in your family have previous history of blood clots? No   Do you or does anyone in your family have a serious bleeding problem such as prolonged bleeding following surgeries or cuts? Vaginal bleeding - reason for procedure   Have you ever had problems with anemia or been told to take iron pills? (!) YES - reason for procedure   Have you had any abnormal blood loss such as black, tarry or bloody stools, or abnormal vaginal bleeding? (!) YES - reason for procedure   Have you ever had a blood transfusion? No   Are you willing to have a blood transfusion if it is medically needed before, during, or after your surgery? Yes   Have you or any of your relatives ever had problems with anesthesia? PONV despite scopolamine patch   Do you have sleep apnea, excessive snoring or daytime drowsiness? No   Do you have any artifical heart valves or other implanted medical devices like a pacemaker, defibrillator, or continuous glucose monitor? No   Do you have artificial joints? No   Are you allergic to latex? No         Preoperative Review of    reviewed - controlled substances reflected in medication list.      Status of Chronic Conditions:  See problem list for active medical problems.  Problems all longstanding and stable, except as noted/documented.  See ROS for pertinent symptoms related to these  conditions.    Patient Active Problem List    Diagnosis Date Noted    ADHD (attention deficit hyperactivity disorder), inattentive type 08/07/2024     Priority: Medium    Menorrhagia with regular cycle 06/27/2024     Priority: Medium    Low iron stores 03/11/2024     Priority: Medium    Anxiety 10/30/2023     Priority: Medium    Macromastia 09/06/2023     Priority: Medium    Chronic pain of both knees 07/19/2023     Priority: Medium    Joint laxity 07/19/2023     Priority: Medium    Cervical high risk HPV (human papillomavirus) test positive 06/14/2019     Priority: Medium     6/14/19 NIL, +HR HPV, not 1618. Plan 1 yr co-test    02/4/21 Lost to follow-up for pap tracking, fyi routed to provider  5/6/2021 NIL pap, neg HPV. Plan cotest in 1 year.  11/18/22 NIL Pap, Neg HPV. Plan cotest in 3 years.         CARDIOVASCULAR SCREENING; LDL GOAL LESS THAN 160 08/16/2011     Priority: Medium      Past Medical History:   Diagnosis Date    Cervical high risk HPV (human papillomavirus) test positive 06/14/2019    Depressive disorder     Kidney infection     UTI (urinary tract infection)      Past Surgical History:   Procedure Laterality Date    MAMMOPLASTY REDUCTION BILATERAL Bilateral 3/22/2024    Procedure: BILATERAL REDUCTION MAMMAPLASTY;  Surgeon: Hardeep Arredondo MD;  Location:  OR    NO HISTORY OF SURGERY       Current Outpatient Medications   Medication Sig Dispense Refill    famotidine (PEPCID) 20 MG tablet Take 20 mg by mouth 2 times daily      fluticasone (FLONASE) 50 MCG/ACT nasal spray Spray 1 spray into both nostrils daily      ibuprofen (ADVIL/MOTRIN) 200 MG capsule Take 200 mg by mouth every 4 hours as needed for fever      lisdexamfetamine (VYVANSE) 40 MG capsule Take 1 capsule (40 mg) by mouth daily. 30 capsule 0    [START ON 3/12/2025] lisdexamfetamine (VYVANSE) 40 MG capsule Take 1 capsule (40 mg) by mouth daily. 30 capsule 0    [START ON 4/11/2025] lisdexamfetamine (VYVANSE) 40 MG capsule Take 1  "capsule (40 mg) by mouth daily. 30 capsule 0    valACYclovir (VALTREX) 1000 mg tablet Take 2 tablets (2,000 mg) by mouth 2 times daily 24 tablet 3    VITAMIN D PO          No Known Allergies     Social History     Tobacco Use    Smoking status: Former     Current packs/day: 0.00     Average packs/day: 0.1 packs/day for 1 year (0.1 ttl pk-yrs)     Types: Cigarettes     Start date: 2017     Quit date: 2018     Years since quittin.6     Passive exposure: Past    Smokeless tobacco: Never   Substance Use Topics    Alcohol use: Yes     Comment: on occasion       History   Drug Use Unknown            ROS: 10 point ROS neg other than the symptoms noted above in the HPI.      Objective    /80 (BP Location: Right arm, Patient Position: Sitting)   Pulse 108   Temp 97.1  F (36.2  C) (Temporal)   Resp 16   Ht 1.651 m (5' 5\")   Wt 78.9 kg (174 lb)   LMP 2025 (Approximate)   SpO2 99%   BMI 28.96 kg/m     Estimated body mass index is 28.96 kg/m  as calculated from the following:    Height as of this encounter: 1.651 m (5' 5\").    Weight as of this encounter: 78.9 kg (174 lb).  Physical Exam  GENERAL: alert and no distress  EYES: Eyes grossly normal to inspection, PERRL and conjunctivae and sclerae normal  HENT: normal cephalic/atraumatic, both ears: occluded with wax, nose and mouth without ulcers or lesions, oropharynx clear, and oral mucous membranes moist  NECK: no adenopathy, no asymmetry, masses, or scars  RESP: lungs clear to auscultation - no rales, rhonchi or wheezes  CV: regular rate and rhythm, normal S1 S2, no S3 or S4, no murmur, click or rub, no peripheral edema  ABDOMEN: soft, nontender, no hepatosplenomegaly, no masses and bowel sounds normal  MS: no gross musculoskeletal defects noted, no edema  SKIN: no suspicious lesions or rashes  NEURO: Normal strength and tone, mentation intact and speech normal  PSYCH: mentation appears normal, affect normal/bright    Recent Labs   Lab Test " 02/03/25  0910 09/26/24  0855   HGB 12.0 12.8    252        Diagnostics  No labs were ordered during this visit.   No EKG required for low risk surgery (cataract, skin procedure, breast biopsy, etc).    Revised Cardiac Risk Index (RCRI)  The patient has the following serious cardiovascular risks for perioperative complications:   - No serious cardiac risks = 0 points     RCRI Interpretation: 0 points: Class I (very low risk - 0.4% complication rate)         Signed Electronically by: Ivette Vargas MD  A copy of this evaluation report is provided to the requesting physician.

## 2025-03-19 ENCOUNTER — LAB REQUISITION (OUTPATIENT)
Dept: LAB | Facility: CLINIC | Age: 40
End: 2025-03-19
Payer: COMMERCIAL

## 2025-03-19 DIAGNOSIS — N92.0 EXCESSIVE AND FREQUENT MENSTRUATION WITH REGULAR CYCLE: ICD-10-CM

## 2025-03-19 PROCEDURE — 88305 TISSUE EXAM BY PATHOLOGIST: CPT | Mod: TC,ORL | Performed by: OBSTETRICS & GYNECOLOGY

## 2025-03-20 PROCEDURE — 88305 TISSUE EXAM BY PATHOLOGIST: CPT | Mod: 26 | Performed by: PATHOLOGY

## 2025-04-08 ENCOUNTER — OFFICE VISIT (OUTPATIENT)
Dept: OBGYN | Facility: CLINIC | Age: 40
End: 2025-04-08
Payer: COMMERCIAL

## 2025-04-08 VITALS — WEIGHT: 175 LBS | BODY MASS INDEX: 29.12 KG/M2 | SYSTOLIC BLOOD PRESSURE: 130 MMHG | DIASTOLIC BLOOD PRESSURE: 86 MMHG

## 2025-04-08 DIAGNOSIS — N92.0 MENORRHAGIA WITH REGULAR CYCLE: Primary | ICD-10-CM

## 2025-04-08 PROCEDURE — 3075F SYST BP GE 130 - 139MM HG: CPT | Performed by: OBSTETRICS & GYNECOLOGY

## 2025-04-08 PROCEDURE — 3079F DIAST BP 80-89 MM HG: CPT | Performed by: OBSTETRICS & GYNECOLOGY

## 2025-04-08 PROCEDURE — 99024 POSTOP FOLLOW-UP VISIT: CPT | Performed by: OBSTETRICS & GYNECOLOGY

## 2025-04-08 NOTE — NURSING NOTE
"Chief Complaint   Patient presents with    Post-op Visit       Initial /86   Wt 79.4 kg (175 lb)   LMP 2025 (Approximate)   BMI 29.12 kg/m   Estimated body mass index is 29.12 kg/m  as calculated from the following:    Height as of 3/6/25: 1.651 m (5' 5\").    Weight as of this encounter: 79.4 kg (175 lb).  BP completed using cuff size: regular long    Questioned patient about current smoking habits.  Pt. quit smoking some time ago.        "

## 2025-04-08 NOTE — PROGRESS NOTES
SUBJECTIVE:                                                   Minerva Cardona is a 39 year old female who presents to clinic today for the following health issue(s):  Postoperative visit    HPI:  She is status post hysteroscopy, dilation and curettage, Novasure ablation. Reports no current bleeding, no fevers, chills, or other concerns. Was crampy right after.      Problem list and histories reviewed & adjusted, as indicated.  Additional history: as documented.    Patient Active Problem List   Diagnosis    CARDIOVASCULAR SCREENING; LDL GOAL LESS THAN 160    Cervical high risk HPV (human papillomavirus) test positive    Chronic pain of both knees    Joint laxity    Macromastia    Anxiety    Low iron stores    Menorrhagia with regular cycle    ADHD (attention deficit hyperactivity disorder), inattentive type     Past Surgical History:   Procedure Laterality Date    MAMMOPLASTY REDUCTION BILATERAL Bilateral 3/22/2024    Procedure: BILATERAL REDUCTION MAMMAPLASTY;  Surgeon: Hardeep Arredondo MD;  Location:  OR    NO HISTORY OF SURGERY        Social History     Tobacco Use    Smoking status: Former     Current packs/day: 0.00     Average packs/day: 0.1 packs/day for 1 year (0.1 ttl pk-yrs)     Types: Cigarettes     Start date: 2017     Quit date: 2018     Years since quittin.7     Passive exposure: Past    Smokeless tobacco: Never   Substance Use Topics    Alcohol use: Yes     Comment: on occasion      Problem (# of Occurrences) Relation (Name,Age of Onset)    Rheumatologic Disease (1) Maternal Aunt: MCTD    Cancer (2) Maternal Grandmother (Dwight Dixon): lymphoma, Paternal Grandmother    Diabetes (1) Maternal Grandfather (Kamari Dixon)    Lupus (1) Maternal Aunt    Other Cancer (3) Maternal Grandmother (Dwight Dixon), Maternal Grandfather (Kamari Dixon), Cousin (Terry)    Scleroderma (1) Maternal Aunt    Colon Cancer (1) Maternal Grandfather (Kamari Dixon): In his mid 60s    Skin Cancer (1) Mother:  Precancerous - not melanoma    Alcoholism (2) Father, Paternal Grandfather           Negative family history of: Breast Cancer              Current Outpatient Medications   Medication Sig Dispense Refill    famotidine (PEPCID) 20 MG tablet Take 20 mg by mouth 2 times daily      fluticasone (FLONASE) 50 MCG/ACT nasal spray Spray 1 spray into both nostrils daily      ibuprofen (ADVIL/MOTRIN) 200 MG capsule Take 200 mg by mouth every 4 hours as needed for fever      lisdexamfetamine (VYVANSE) 40 MG capsule Take 1 capsule (40 mg) by mouth daily. 30 capsule 0    [START ON 4/11/2025] lisdexamfetamine (VYVANSE) 40 MG capsule Take 1 capsule (40 mg) by mouth daily. 30 capsule 0    valACYclovir (VALTREX) 1000 mg tablet Take 2 tablets (2,000 mg) by mouth 2 times daily 24 tablet 3    VITAMIN D PO        No current facility-administered medications for this visit.     No Known Allergies      OBJECTIVE:     /86   Wt 79.4 kg (175 lb)   LMP 02/02/2025 (Approximate)   BMI 29.12 kg/m     BMI: Body mass index is 29.12 kg/m .  General: Alert and oriented, no distress.  Psychiatric: Mood and affect within normal limits.  No other exam indicated today.    In-Clinic Test Results:  No results found for this or any previous visit (from the past 24 hours).    ASSESSMENT/PLAN:                                                      Postoperative exam  Follow up as needed for routine care.  No further restrictions needed.    Raysa Olson MD  [facility]

## 2025-05-29 ENCOUNTER — MYC MEDICAL ADVICE (OUTPATIENT)
Dept: PEDIATRICS | Facility: CLINIC | Age: 40
End: 2025-05-29
Payer: COMMERCIAL

## 2025-05-29 DIAGNOSIS — F90.0 ADHD (ATTENTION DEFICIT HYPERACTIVITY DISORDER), INATTENTIVE TYPE: ICD-10-CM

## 2025-05-29 RX ORDER — LISDEXAMFETAMINE DIMESYLATE 40 MG/1
40 CAPSULE ORAL DAILY
Qty: 30 CAPSULE | Refills: 0 | Status: SHIPPED | OUTPATIENT
Start: 2025-06-28 | End: 2025-07-28

## 2025-05-29 RX ORDER — LISDEXAMFETAMINE DIMESYLATE 40 MG/1
40 CAPSULE ORAL DAILY
Qty: 30 CAPSULE | Refills: 0 | Status: SHIPPED | OUTPATIENT
Start: 2025-05-29 | End: 2025-06-28

## 2025-05-29 RX ORDER — LISDEXAMFETAMINE DIMESYLATE 40 MG/1
40 CAPSULE ORAL DAILY
Qty: 30 CAPSULE | Refills: 0 | Status: CANCELLED | OUTPATIENT
Start: 2025-05-29

## 2025-05-29 RX ORDER — LISDEXAMFETAMINE DIMESYLATE 40 MG/1
40 CAPSULE ORAL DAILY
Qty: 30 CAPSULE | Refills: 0 | Status: SHIPPED | OUTPATIENT
Start: 2025-07-28 | End: 2025-08-27

## 2025-05-29 NOTE — TELEPHONE ENCOUNTER
Please review HelpHubt message and 5/14 evisit. Patient requesting refill of Vyvanse. Please advise.  Thanks!  Alma MCCAIN RN, BSN  Clinic RN  [facility]

## 2025-07-07 ENCOUNTER — OFFICE VISIT (OUTPATIENT)
Dept: PEDIATRICS | Facility: CLINIC | Age: 40
End: 2025-07-07
Attending: INTERNAL MEDICINE
Payer: COMMERCIAL

## 2025-07-07 VITALS
RESPIRATION RATE: 18 BRPM | HEART RATE: 71 BPM | SYSTOLIC BLOOD PRESSURE: 117 MMHG | BODY MASS INDEX: 28.54 KG/M2 | OXYGEN SATURATION: 99 % | WEIGHT: 171.3 LBS | HEIGHT: 65 IN | TEMPERATURE: 97.3 F | DIASTOLIC BLOOD PRESSURE: 87 MMHG

## 2025-07-07 DIAGNOSIS — N92.0 MENORRHAGIA WITH REGULAR CYCLE: ICD-10-CM

## 2025-07-07 DIAGNOSIS — Z86.19 H/O COLD SORES: ICD-10-CM

## 2025-07-07 DIAGNOSIS — E55.9 VITAMIN D DEFICIENCY: ICD-10-CM

## 2025-07-07 DIAGNOSIS — R79.0 LOW IRON STORES: ICD-10-CM

## 2025-07-07 DIAGNOSIS — Z13.220 SCREENING FOR HYPERLIPIDEMIA: ICD-10-CM

## 2025-07-07 DIAGNOSIS — Z11.3 SCREENING EXAMINATION FOR VENEREAL DISEASE: ICD-10-CM

## 2025-07-07 DIAGNOSIS — F41.9 ANXIETY: ICD-10-CM

## 2025-07-07 DIAGNOSIS — Z12.31 ENCOUNTER FOR SCREENING MAMMOGRAM FOR MALIGNANT NEOPLASM OF BREAST: ICD-10-CM

## 2025-07-07 DIAGNOSIS — Z13.1 SCREENING FOR DIABETES MELLITUS: ICD-10-CM

## 2025-07-07 DIAGNOSIS — Z00.00 ROUTINE GENERAL MEDICAL EXAMINATION AT A HEALTH CARE FACILITY: Primary | ICD-10-CM

## 2025-07-07 DIAGNOSIS — Z12.4 SCREENING FOR CERVICAL CANCER: ICD-10-CM

## 2025-07-07 DIAGNOSIS — F90.0 ADHD (ATTENTION DEFICIT HYPERACTIVITY DISORDER), INATTENTIVE TYPE: ICD-10-CM

## 2025-07-07 PROCEDURE — G0145 SCR C/V CYTO,THINLAYER,RESCR: HCPCS | Performed by: INTERNAL MEDICINE

## 2025-07-07 PROCEDURE — 87624 HPV HI-RISK TYP POOLED RSLT: CPT | Performed by: INTERNAL MEDICINE

## 2025-07-07 PROCEDURE — 3074F SYST BP LT 130 MM HG: CPT | Performed by: INTERNAL MEDICINE

## 2025-07-07 PROCEDURE — 3079F DIAST BP 80-89 MM HG: CPT | Performed by: INTERNAL MEDICINE

## 2025-07-07 PROCEDURE — 87591 N.GONORRHOEAE DNA AMP PROB: CPT | Performed by: INTERNAL MEDICINE

## 2025-07-07 PROCEDURE — 99395 PREV VISIT EST AGE 18-39: CPT | Performed by: INTERNAL MEDICINE

## 2025-07-07 PROCEDURE — 87491 CHLMYD TRACH DNA AMP PROBE: CPT | Performed by: INTERNAL MEDICINE

## 2025-07-07 PROCEDURE — 1125F AMNT PAIN NOTED PAIN PRSNT: CPT | Performed by: INTERNAL MEDICINE

## 2025-07-07 RX ORDER — VALACYCLOVIR HYDROCHLORIDE 1 G/1
2000 TABLET, FILM COATED ORAL 2 TIMES DAILY
Qty: 24 TABLET | Refills: 3 | Status: SHIPPED | OUTPATIENT
Start: 2025-07-07

## 2025-07-07 SDOH — HEALTH STABILITY: PHYSICAL HEALTH: ON AVERAGE, HOW MANY DAYS PER WEEK DO YOU ENGAGE IN MODERATE TO STRENUOUS EXERCISE (LIKE A BRISK WALK)?: 5 DAYS

## 2025-07-07 SDOH — HEALTH STABILITY: PHYSICAL HEALTH: ON AVERAGE, HOW MANY MINUTES DO YOU ENGAGE IN EXERCISE AT THIS LEVEL?: 10 MIN

## 2025-07-07 ASSESSMENT — PATIENT HEALTH QUESTIONNAIRE - PHQ9
SUM OF ALL RESPONSES TO PHQ QUESTIONS 1-9: 10
SUM OF ALL RESPONSES TO PHQ QUESTIONS 1-9: 10
10. IF YOU CHECKED OFF ANY PROBLEMS, HOW DIFFICULT HAVE THESE PROBLEMS MADE IT FOR YOU TO DO YOUR WORK, TAKE CARE OF THINGS AT HOME, OR GET ALONG WITH OTHER PEOPLE: SOMEWHAT DIFFICULT

## 2025-07-07 ASSESSMENT — SOCIAL DETERMINANTS OF HEALTH (SDOH): HOW OFTEN DO YOU GET TOGETHER WITH FRIENDS OR RELATIVES?: ONCE A WEEK

## 2025-07-07 ASSESSMENT — PAIN SCALES - GENERAL: PAINLEVEL_OUTOF10: MILD PAIN (3)

## 2025-07-07 NOTE — PROGRESS NOTES
# ADHD  - vyvanse 49    # Low iron  # Menorrhagia with regular cycle   Latest Reference Range & Units 05/01/23 11:42 06/04/24 09:54 09/26/24 08:55 02/03/25 09:10   Ferritin 6 - 175 ng/mL 13 14 30 11   Iron 37 - 145 ug/dL   63    Iron Binding Capacity 240 - 430 ug/dL   440 (H)    Iron Sat Index 15 - 46 %   14 (L)    (H): Data is abnormally high  (L): Data is abnormally low     Latest Reference Range & Units 06/04/24 09:54 09/26/24 08:55 02/03/25 09:10   Hemoglobin 11.7 - 15.7 g/dL 12.2 12.8 12.0   Platelet Count 150 - 450 10e3/uL 230 252 258   MCV 78 - 100 fL 88 92 85   RDW 10.0 - 15.0 % 15.2 (H) 13.6 13.7   (H): Data is abnormally high    # h/o cold sores  - valtrex

## 2025-07-07 NOTE — PROGRESS NOTES
"Preventive Care Visit  [facility] JESENIA Hernandez MD, Internal Medicine - Pediatrics  Jul 7, 2025      Assessment & Plan       ICD-10-CM    1. Routine general medical examination at a health care facility  Z00.00       2. Anxiety  F41.9 Adult Mental Health  Referral      3. ADHD (attention deficit hyperactivity disorder), inattentive type  F90.0       4. Low iron stores  R79.0 CBC with platelets     Ferritin      5. Menorrhagia with regular cycle  N92.0 CBC with platelets     Ferritin      6. H/O cold sores  Z86.19 valACYclovir (VALTREX) 1000 mg tablet      7. Screening for diabetes mellitus  Z13.1 Comprehensive metabolic panel     Hemoglobin A1c      8. Screening for hyperlipidemia  Z13.220 Lipid panel reflex to direct LDL Fasting      9. Vitamin D deficiency  E55.9 Vitamin D Deficiency      10. Encounter for screening mammogram for malignant neoplasm of breast  Z12.31 MA Screen Bilateral w/Dave        Great to see you.     Let's also connect you with a counselor (you've got it for your kids!) [email or web address]  [facility] will call you to schedule    Labs and a virtual visit in 6 months to review.   Hopeful that iron stores have come up a bit.     If recurrent heavy/painful bleeding -> Dr. Olson.     BMI  Estimated body mass index is 28.37 kg/m  as calculated from the following:    Height as of this encounter: 1.655 m (5' 5.16\").    Weight as of this encounter: 77.7 kg (171 lb 4.8 oz).     Depression Screening Follow Up        7/7/2025    11:31 AM   PHQ   PHQ-9 Total Score 10    Q9: Thoughts of better off dead/self-harm past 2 weeks Not at all       Patient-reported         7/7/2025    11:31 AM   Last PHQ-9   1.  Little interest or pleasure in doing things 1   2.  Feeling down, depressed, or hopeless 2   3.  Trouble falling or staying asleep, or sleeping too much 2   4.  Feeling tired or having little energy 1   5.  Poor appetite or overeating 3   6.  Feeling bad about " yourself 0   7.  Trouble concentrating 1   8.  Moving slowly or restless 0   Q9: Thoughts of better off dead/self-harm past 2 weeks 0   PHQ-9 Total Score 10        Patient-reported         Follow Up Actions Taken  Mental Health Referral placed   Reviewed preventive health counseling, as reflected in patient instructions  Counseling  Appropriate preventive services were addressed with this patient via screening, questionnaire, or discussion as appropriate for fall prevention, nutrition, physical activity, Tobacco-use cessation, social engagement, weight loss and cognition.  Checklist reviewing preventive services available has been given to the patient.  Reviewed patient's diet, addressing concerns and/or questions.   The patient was instructed to see the dentist every 6 months.   The patient's PHQ-9 score is consistent with moderate depression. She was provided with information regarding depression.     Brant Palma is a 39 year old, presenting for the following:  Annual Visit        7/7/2025     1:17 PM   Additional Questions   Roomed by [facility]   Accompanied by Self         7/7/2025     1:17 PM   Patient Reported Additional Medications   Patient reports taking the following new medications none          HPI    # ADHD  - vyvanse 49  Wt Readings from Last 4 Encounters:   07/07/25 77.7 kg (171 lb 4.8 oz)   04/08/25 79.4 kg (175 lb)   03/06/25 78.9 kg (174 lb)   02/20/25 78.9 kg (174 lb)       # Low iron  # Menorrhagia with regular cycle  - s/p She is status post hysteroscopy, dilation and curettage, Novasure ablation 3/2025   Latest Reference Range & Units 05/01/23 11:42 06/04/24 09:54 09/26/24 08:55 02/03/25 09:10   Ferritin 6 - 175 ng/mL 13 14 30 11   Iron 37 - 145 ug/dL   63    Iron Binding Capacity 240 - 430 ug/dL   440 (H)    Iron Sat Index 15 - 46 %   14 (L)    (H): Data is abnormally high  (L): Data is abnormally low     Latest Reference Range & Units 06/04/24 09:54 09/26/24 08:55 02/03/25 09:10    Hemoglobin 11.7 - 15.7 g/dL 12.2 12.8 12.0   Platelet Count 150 - 450 10e3/uL 230 252 258   MCV 78 - 100 fL 88 92 85   RDW 10.0 - 15.0 % 15.2 (H) 13.6 13.7   (H): Data is abnormally high    # h/o cold sores  - valtrex    Advance Care Planning    Discussed advance care planning with patient; informed AVS has link to [employer].        7/7/2025   General Health   How would you rate your overall physical health? (!) FAIR   Feel stress (tense, anxious, or unable to sleep) Patient declined         7/7/2025   Nutrition   Three or more servings of calcium each day? (!) I DON'T KNOW   Diet: Regular (no restrictions)   How many servings of fruit and vegetables per day? (!) 2-3   How many sweetened beverages each day? 0-1         7/7/2025   Exercise   Days per week of moderate/strenous exercise 5 days   Average minutes spent exercising at this level 10 min         7/7/2025   Social Factors   Frequency of gathering with friends or relatives Once a week   Worry food won't last until get money to buy more No   Food not last or not have enough money for food? No   Do you have housing? (Housing is defined as stable permanent housing and does not include staying outside in a car, in a tent, in an abandoned building, in an overnight shelter, or couch-surfing.) Yes   Are you worried about losing your housing? No   Lack of transportation? No   Unable to get utilities (heat,electricity)? No         7/7/2025   Dental   Dentist two times every year? (!) NO       Today's PHQ-9 Score:       7/7/2025    11:31 AM   PHQ-9 SCORE   PHQ-9 Total Score MyChart 10 (Moderate depression)   PHQ-9 Total Score 10        Patient-reported         7/7/2025   Substance Use   Alcohol more than 3/day or more than 7/wk No   Do you use any other substances recreationally? (!) CANNABIS PRODUCTS     Social History     Tobacco Use    Smoking status: Former     Current packs/day: 0.00     Average packs/day: 0.1 packs/day for 1 year (0.1 ttl pk-yrs)      "Types: Cigarettes     Start date: 2017     Quit date: 2018     Years since quittin.0     Passive exposure: Past    Smokeless tobacco: Never   Vaping Use    Vaping status: Never Used   Substance Use Topics    Alcohol use: Not Currently     Comment: on occasion    Drug use: Never           2024   LAST FHS-7 RESULTS   1st degree relative breast or ovarian cancer No        Mammogram Screening - Mammogram every 1-2 years updated in Health Maintenance based on mutual decision making        2025   STI Screening   New sexual partner(s) since last STI/HIV test? No     History of abnormal Pap smear: YES - reflected in Problem List and Health Maintenance accordingly        Latest Ref Rng & Units 2022     3:37 PM 2021     9:53 AM 2021     9:25 AM   PAP / HPV   PAP  Negative for Intraepithelial Lesion or Malignancy (NILM)      PAP (Historical)    NIL    HPV 16 DNA Negative Negative  Negative     HPV 18 DNA Negative Negative  Negative     Other HR HPV Negative Negative  Negative             2025   Contraception/Family Planning   Questions about contraception or family planning No   What are your periods like? (!) PAINFUL (CRAMPING)        Reviewed and updated as needed this visit by Provider                         Objective    Exam  /87 (BP Location: Right arm, Patient Position: Sitting, Cuff Size: Adult Large)   Pulse 71   Temp 97.3  F (36.3  C) (Temporal)   Resp 18   Ht 1.655 m (5' 5.16\")   Wt 77.7 kg (171 lb 4.8 oz)   LMP  (LMP Unknown)   SpO2 99%   BMI 28.37 kg/m     Estimated body mass index is 28.37 kg/m  as calculated from the following:    Height as of this encounter: 1.655 m (5' 5.16\").    Weight as of this encounter: 77.7 kg (171 lb 4.8 oz).    Physical Exam  GENERAL: alert and no distress  EYES: Eyes grossly normal to inspection, PERRL and conjunctivae and sclerae normal  HENT: ear canals and TM's normal, nose and mouth without ulcers or lesions  NECK: no " adenopathy, no asymmetry, masses, or scars  RESP: lungs clear to auscultation - no rales, rhonchi or wheezes  CV: regular rate and rhythm, normal S1 S2, no S3 or S4, no murmur, click or rub, no peripheral edema  ABDOMEN: soft, nontender, no hepatosplenomegaly, no masses and bowel sounds normal  MS: no gross musculoskeletal defects noted, no edema  SKIN: no suspicious lesions or rashes  NEURO: Normal strength and tone, mentation intact and speech normal  PSYCH: mentation appears normal, affect normal/bright    The longitudinal plan of care for the diagnosis(es)/condition(s) as documented were addressed during this visit. Due to the added complexity in care, I will continue to support Mine[place]ra in the subsequent management and with ongoing continuity of care.    Signed Electronically by: Jeromy Hernandez MD    Answers submitted by the patient for this visit:  Patient Health Questionnaire (Submitted on 7/7/2025)  If you checked off any problems, how difficult have these problems made it for you to do your work, take care of things at home, or get along with other people?: Somewhat difficult  PHQ9 TOTAL SCORE: 10

## 2025-07-07 NOTE — PATIENT INSTRUCTIONS
Great to see you.     Let's also connect you with a counselor (you've got it for your kids!) [email or web address]  [facility] will call you to schedule    Labs and a virtual visit in 6 months to review.   Hopeful that iron stores have come up a bit.     If recurrent heavy/painful bleeding -> Dr. Olson.     Patient Education   Preventive Care Advice   This is general advice given by our system to help you stay healthy. However, your care team may have specific advice just for you. Please talk to your care team about your preventive care needs.  Nutrition  Eat 5 or more servings of fruits and vegetables each day.  Try wheat bread, brown rice and whole grain pasta (instead of white bread, rice, and pasta).  Get enough calcium and vitamin D. Check the label on foods and aim for 100% of the RDA (recommended daily allowance).  Lifestyle  Exercise at least 150 minutes each week  (30 minutes a day, 5 days a week).  Do muscle strengthening activities 2 days a week. These help control your weight and prevent disease.  No smoking.  Wear sunscreen to prevent skin cancer.  Have a dental exam and cleaning every 6 months.  Yearly exams  See your health care team every year to talk about:  Any changes in your health.  Any medicines your care team has prescribed.  Preventive care, family planning, and ways to prevent chronic diseases.  Shots (vaccines)   HPV shots (up to age 26), if you've never had them before.  Hepatitis B shots (up to age 59), if you've never had them before.  COVID-19 shot: Get this shot when it's due.  Flu shot: Get a flu shot every year.  Tetanus shot: Get a tetanus shot every 10 years.  Pneumococcal, hepatitis A, and RSV shots: Ask your care team if you need these based on your risk.  Shingles shot (for age 50 and up)  General health tests  Diabetes screening:  Starting at age 35, Get screened for diabetes at least every 3 years.  If you are younger than age 35, ask your care team if you should be screened for  diabetes.  Cholesterol test: At age 39, start having a cholesterol test every 5 years, or more often if advised.  Bone density scan (DEXA): At age 50, ask your care team if you should have this scan for osteoporosis (brittle bones).  Hepatitis C: Get tested at least once in your life.  STIs (sexually transmitted infections)  Before age 24: Ask your care team if you should be screened for STIs.  After age 24: Get screened for STIs if you're at risk. You are at risk for STIs (including HIV) if:  You are sexually active with more than one person.  You don't use condoms every time.  You or a partner was diagnosed with a sexually transmitted infection.  If you are at risk for HIV, ask about PrEP medicine to prevent HIV.  Get tested for HIV at least once in your life, whether you are at risk for HIV or not.  Cancer screening tests  Cervical cancer screening: If you have a cervix, begin getting regular cervical cancer screening tests starting at age 21.  Breast cancer scan (mammogram): If you've ever had breasts, begin having regular mammograms starting at age 40. This is a scan to check for breast cancer.  Colon cancer screening: It is important to start screening for colon cancer at age 45.  Have a colonoscopy test every 10 years (or more often if you're at risk) Or, ask your provider about stool tests like a FIT test every year or Cologuard test every 3 years.  To learn more about your testing options, visit:   .  For help making a decision, visit:   https://bit.ly/qk17551.  Prostate cancer screening test: If you have a prostate, ask your care team if a prostate cancer screening test (PSA) at age 55 is right for you.  Lung cancer screening: If you are a current or former smoker ages 50 to 80, ask your care team if ongoing lung cancer screenings are right for you.  For informational purposes only. Not to replace the advice of your health care provider. Copyright   2023 [employer]. All rights reserved.  Clinically reviewed by the [facility] Transitions Program. [employer] [id number] - REV 01/24.

## 2025-07-08 ENCOUNTER — PATIENT OUTREACH (OUTPATIENT)
Dept: CARE COORDINATION | Facility: CLINIC | Age: 40
End: 2025-07-08
Payer: COMMERCIAL

## 2025-07-08 ENCOUNTER — RESULTS FOLLOW-UP (OUTPATIENT)
Dept: PEDIATRICS | Facility: CLINIC | Age: 40
End: 2025-07-08
Payer: COMMERCIAL

## 2025-07-08 LAB
C TRACH DNA SPEC QL NAA+PROBE: NEGATIVE
HPV HR 12 DNA CVX QL NAA+PROBE: NEGATIVE
HPV16 DNA CVX QL NAA+PROBE: NEGATIVE
HPV18 DNA CVX QL NAA+PROBE: NEGATIVE
HUMAN PAPILLOMA VIRUS FINAL DIAGNOSIS: NORMAL
N GONORRHOEA DNA SPEC QL NAA+PROBE: NEGATIVE
SPECIMEN TYPE: NORMAL
SPECIMEN TYPE: NORMAL

## 2025-07-10 ENCOUNTER — PATIENT OUTREACH (OUTPATIENT)
Dept: CARE COORDINATION | Facility: CLINIC | Age: 40
End: 2025-07-10
Payer: COMMERCIAL

## 2025-07-10 LAB
BKR AP ASSOCIATED HPV REPORT: NORMAL
BKR LAB AP GYN ADEQUACY: NORMAL
BKR LAB AP GYN INTERPRETATION: NORMAL
BKR LAB AP PREVIOUS ABNORMAL: NORMAL
PATH REPORT.COMMENTS IMP SPEC: NORMAL
PATH REPORT.COMMENTS IMP SPEC: NORMAL
PATH REPORT.RELEVANT HX SPEC: NORMAL

## (undated) DEVICE — GLOVE BIOGEL PI MICRO SZ 7.5 48575

## (undated) DEVICE — ESU PENCIL SMOKE EVAC W/ROCKER SWITCH 0703-047-000

## (undated) DEVICE — ESU ELEC BLADE 2.75" COATED/INSULATED E1455

## (undated) DEVICE — SU PDS II 2-0 SH 27" Z317H

## (undated) DEVICE — SYR BULB IRRIG DOVER 60 ML LATEX FREE 67000

## (undated) DEVICE — PACK MINOR SBA15MIFSE

## (undated) DEVICE — SU VICRYL 2-0 CT-1 27" UND J259H

## (undated) DEVICE — DRSG KERLIX FLUFFS X5

## (undated) DEVICE — SOL NACL 0.9% IRRIG 1000ML BOTTLE 07138-09

## (undated) DEVICE — DRSG STERI STRIP 1/2X4" R1547

## (undated) DEVICE — DRAPE U SPLIT 74X120" 29440

## (undated) DEVICE — MARKER SKIN DOUBLE TIP W/FLEXI-RULER W/LABELS

## (undated) DEVICE — SUCTION TIP YANKAUER W/O VENT K86

## (undated) DEVICE — SU DERMABOND PRINEO 42CM CLR422US

## (undated) DEVICE — MANIFOLD NEPTUNE 4 PORT 700-20

## (undated) DEVICE — SPONGE LAP 18X18" X8435

## (undated) DEVICE — DRAPE IOBAN INCISE 23X17" 6650EZ

## (undated) DEVICE — DRSG KERLIX 4 1/2"X4YDS ROLL 6715

## (undated) DEVICE — SOL WATER IRRIG 1000ML BOTTLE 07139-09

## (undated) DEVICE — SU STRATAFIX MONOCRYL 3-0 SPIRAL PS-2 45CM SXMP1B107

## (undated) DEVICE — ESU GROUND PAD ADULT W/CORD E7507

## (undated) DEVICE — SU MONOCRYL 3-0 PS-2 27" Y427H

## (undated) DEVICE — PREP CHLORAPREP 26ML TINTED ORANGE  260815

## (undated) DEVICE — ESU CLEANER TIP 31142717

## (undated) DEVICE — SU MONOCRYL 4-0 PS-2 18" UND Y496G

## (undated) DEVICE — STPL SKIN 35W 054887

## (undated) DEVICE — BLADE KNIFE SURG 10 371110

## (undated) DEVICE — DRAPE SHEET HALF 40X60" 9358

## (undated) RX ORDER — ONDANSETRON 2 MG/ML
INJECTION INTRAMUSCULAR; INTRAVENOUS
Status: DISPENSED
Start: 2024-03-22

## (undated) RX ORDER — BUPIVACAINE HYDROCHLORIDE 2.5 MG/ML
INJECTION, SOLUTION INFILTRATION; PERINEURAL
Status: DISPENSED
Start: 2024-03-22

## (undated) RX ORDER — PROPOFOL 10 MG/ML
INJECTION, EMULSION INTRAVENOUS
Status: DISPENSED
Start: 2024-03-22

## (undated) RX ORDER — SCOLOPAMINE TRANSDERMAL SYSTEM 1 MG/1
PATCH, EXTENDED RELEASE TRANSDERMAL
Status: DISPENSED
Start: 2024-03-22

## (undated) RX ORDER — ACETAMINOPHEN 325 MG/1
TABLET ORAL
Status: DISPENSED
Start: 2024-03-22

## (undated) RX ORDER — FENTANYL CITRATE 50 UG/ML
INJECTION, SOLUTION INTRAMUSCULAR; INTRAVENOUS
Status: DISPENSED
Start: 2024-03-22

## (undated) RX ORDER — CEFAZOLIN SODIUM 2 G/50ML
SOLUTION INTRAVENOUS
Status: DISPENSED
Start: 2024-03-22

## (undated) RX ORDER — OXYCODONE HYDROCHLORIDE 5 MG/1
TABLET ORAL
Status: DISPENSED
Start: 2024-03-22

## (undated) RX ORDER — DEXAMETHASONE SODIUM PHOSPHATE 4 MG/ML
INJECTION, SOLUTION INTRA-ARTICULAR; INTRALESIONAL; INTRAMUSCULAR; INTRAVENOUS; SOFT TISSUE
Status: DISPENSED
Start: 2024-03-22